# Patient Record
Sex: FEMALE | Race: WHITE | NOT HISPANIC OR LATINO | Employment: UNEMPLOYED | URBAN - METROPOLITAN AREA
[De-identification: names, ages, dates, MRNs, and addresses within clinical notes are randomized per-mention and may not be internally consistent; named-entity substitution may affect disease eponyms.]

---

## 2018-03-26 RX ORDER — FENTANYL 12.5 UG/1
1 PATCH TRANSDERMAL
COMMUNITY
Start: 2018-01-25 | End: 2018-03-26 | Stop reason: SDUPTHER

## 2018-03-26 RX ORDER — OXYCODONE AND ACETAMINOPHEN 5; 325 MG/1; MG/1
1-2 TABLET ORAL EVERY 6 HOURS PRN
COMMUNITY
Start: 2018-01-25 | End: 2018-03-26 | Stop reason: SDUPTHER

## 2018-03-26 NOTE — TELEPHONE ENCOUNTER
Pt would like an written Rx Oxycodone 5mg-325mg and Rx for Rx Fentanyl Patch 12.5 mg. If any questions Pt can be reached

## 2018-03-27 RX ORDER — OXYCODONE AND ACETAMINOPHEN 5; 325 MG/1; MG/1
2 TABLET ORAL EVERY 6 HOURS PRN
Qty: 240 TABLET | Refills: 0 | Status: SHIPPED | OUTPATIENT
Start: 2018-03-27 | End: 2018-04-27 | Stop reason: SDUPTHER

## 2018-03-27 RX ORDER — FENTANYL 12.5 UG/1
1 PATCH TRANSDERMAL
Qty: 10 PATCH | Refills: 0 | Status: SHIPPED | OUTPATIENT
Start: 2018-03-27 | End: 2018-04-27 | Stop reason: SDUPTHER

## 2018-04-09 RX ORDER — ALPRAZOLAM 0.5 MG/1
1 TABLET ORAL DAILY
COMMUNITY
Start: 2017-12-01 | End: 2018-04-09 | Stop reason: SDUPTHER

## 2018-04-10 RX ORDER — ALPRAZOLAM 0.5 MG/1
0.5 TABLET ORAL 3 TIMES DAILY PRN
Qty: 90 TABLET | Refills: 0 | Status: SHIPPED | OUTPATIENT
Start: 2018-04-10 | End: 2018-05-10 | Stop reason: SDUPTHER

## 2018-04-27 NOTE — TELEPHONE ENCOUNTER
Pt needs written Rx Fentanyl Patch 12 mcg and Rx Oxycodone 5-325 mg to be picked up. If any questions pt can be reached at

## 2018-04-30 RX ORDER — OXYCODONE AND ACETAMINOPHEN 5; 325 MG/1; MG/1
2 TABLET ORAL EVERY 6 HOURS PRN
Qty: 240 TABLET | Refills: 0 | Status: SHIPPED | OUTPATIENT
Start: 2018-04-30 | End: 2018-05-29 | Stop reason: SDUPTHER

## 2018-04-30 RX ORDER — FENTANYL 12.5 UG/1
1 PATCH TRANSDERMAL
Qty: 10 PATCH | Refills: 0 | Status: SHIPPED | OUTPATIENT
Start: 2018-04-30 | End: 2018-05-29 | Stop reason: SDUPTHER

## 2018-05-11 RX ORDER — ALPRAZOLAM 0.5 MG/1
0.5 TABLET ORAL 3 TIMES DAILY PRN
Qty: 90 TABLET | Refills: 0 | Status: SHIPPED | OUTPATIENT
Start: 2018-05-11 | End: 2018-06-12 | Stop reason: SDUPTHER

## 2018-05-12 RX ORDER — METOPROLOL SUCCINATE 100 MG/1
100 TABLET, EXTENDED RELEASE ORAL DAILY
Qty: 90 TABLET | Refills: 1 | Status: SHIPPED | OUTPATIENT
Start: 2018-05-12 | End: 2018-05-16 | Stop reason: SDUPTHER

## 2018-05-16 RX ORDER — METOPROLOL SUCCINATE 100 MG/1
100 TABLET, EXTENDED RELEASE ORAL DAILY
Qty: 90 TABLET | Refills: 1 | Status: SHIPPED | OUTPATIENT
Start: 2018-05-16 | End: 2018-10-30 | Stop reason: SDUPTHER

## 2018-05-18 PROBLEM — R10.9 UNSPECIFIED ABDOMINAL PAIN: Status: ACTIVE | Noted: 2017-07-05

## 2018-05-18 RX ORDER — LIFITEGRAST 50 MG/ML
1 SOLUTION/ DROPS OPHTHALMIC 2 TIMES DAILY
Refills: 0 | COMMUNITY
Start: 2018-04-05 | End: 2019-08-20 | Stop reason: SDUPTHER

## 2018-05-18 RX ORDER — METOPROLOL SUCCINATE 100 MG/1
1 TABLET, EXTENDED RELEASE ORAL DAILY
COMMUNITY
Start: 2017-12-13 | End: 2018-10-30

## 2018-05-18 RX ORDER — ESCITALOPRAM OXALATE 20 MG/1
1 TABLET ORAL DAILY
Refills: 0 | COMMUNITY
Start: 2018-04-28 | End: 2018-10-30 | Stop reason: SDUPTHER

## 2018-05-18 RX ORDER — HYDROCHLOROTHIAZIDE 25 MG/1
1 TABLET ORAL DAILY
COMMUNITY
Start: 2017-12-13 | End: 2018-10-30 | Stop reason: SDUPTHER

## 2018-05-18 RX ORDER — OLMESARTAN MEDOXOMIL 40 MG/1
1 TABLET ORAL DAILY
COMMUNITY
Start: 2017-12-13 | End: 2018-10-30 | Stop reason: SDUPTHER

## 2018-05-29 ENCOUNTER — OFFICE VISIT (OUTPATIENT)
Dept: FAMILY MEDICINE | Facility: CLINIC | Age: 53
End: 2018-05-29
Payer: COMMERCIAL

## 2018-05-29 VITALS
OXYGEN SATURATION: 95 % | HEART RATE: 49 BPM | DIASTOLIC BLOOD PRESSURE: 70 MMHG | TEMPERATURE: 98.6 F | BODY MASS INDEX: 32.62 KG/M2 | HEIGHT: 66 IN | WEIGHT: 203 LBS | SYSTOLIC BLOOD PRESSURE: 138 MMHG

## 2018-05-29 DIAGNOSIS — F33.1 MDD (MAJOR DEPRESSIVE DISORDER), RECURRENT EPISODE, MODERATE (CMS/HCC): ICD-10-CM

## 2018-05-29 DIAGNOSIS — M54.42 ACUTE LOW BACK PAIN WITH BILATERAL SCIATICA, UNSPECIFIED BACK PAIN LATERALITY: Primary | ICD-10-CM

## 2018-05-29 DIAGNOSIS — F41.9 ANXIETY: ICD-10-CM

## 2018-05-29 DIAGNOSIS — M54.41 ACUTE LOW BACK PAIN WITH BILATERAL SCIATICA, UNSPECIFIED BACK PAIN LATERALITY: Primary | ICD-10-CM

## 2018-05-29 DIAGNOSIS — I10 HYPERTENSION, ESSENTIAL: ICD-10-CM

## 2018-05-29 PROCEDURE — 99214 OFFICE O/P EST MOD 30 MIN: CPT | Performed by: FAMILY MEDICINE

## 2018-05-29 RX ORDER — OXYCODONE AND ACETAMINOPHEN 5; 325 MG/1; MG/1
2 TABLET ORAL EVERY 6 HOURS PRN
Qty: 180 TABLET | Refills: 0 | Status: SHIPPED | OUTPATIENT
Start: 2018-05-29 | End: 2018-06-19 | Stop reason: SDUPTHER

## 2018-05-29 RX ORDER — KETOROLAC TROMETHAMINE 10 MG/1
10 TABLET, FILM COATED ORAL EVERY 6 HOURS PRN
Qty: 20 TABLET | Refills: 0 | Status: SHIPPED | OUTPATIENT
Start: 2018-05-29 | End: 2019-12-30 | Stop reason: ALTCHOICE

## 2018-05-29 RX ORDER — FENTANYL 25 UG/1
1 PATCH TRANSDERMAL SEE ADMIN INSTRUCTIONS
Refills: 0 | COMMUNITY
Start: 2018-02-23 | End: 2018-05-29 | Stop reason: SDUPTHER

## 2018-05-29 RX ORDER — FENTANYL 25 UG/1
1 PATCH TRANSDERMAL SEE ADMIN INSTRUCTIONS
Qty: 10 PATCH | Refills: 0 | Status: SHIPPED | OUTPATIENT
Start: 2018-05-29 | End: 2018-06-19 | Stop reason: SDUPTHER

## 2018-05-29 RX ORDER — DEXAMETHASONE 1 MG/1
TABLET ORAL
Qty: 200 TABLET | Refills: 0 | Status: SHIPPED | OUTPATIENT
Start: 2018-05-29 | End: 2018-10-30

## 2018-05-29 RX ORDER — CYCLOBENZAPRINE HCL 10 MG
10 TABLET ORAL NIGHTLY PRN
Qty: 30 TABLET | Refills: 1 | Status: SHIPPED | OUTPATIENT
Start: 2018-05-29 | End: 2018-10-30

## 2018-05-29 RX ORDER — FENTANYL 12.5 UG/1
1 PATCH TRANSDERMAL
Qty: 10 PATCH | Refills: 0 | Status: SHIPPED | OUTPATIENT
Start: 2018-05-29 | End: 2018-06-19 | Stop reason: SDUPTHER

## 2018-05-29 NOTE — PROGRESS NOTES
Subjective      Patient ID: Hilda Cornelius is a 52 y.o. female.    5/19 injections didn't hlep much    [place]    Laid down on heating pad. Inflamed/worse  Got up excrutiating    Hold onto wall    No fall, spontaneous back pain      Prednisone 5 days helping  Toradol    [employer] job    Was down to 12.5 patch,  Up to 25 and 12.5 now          The following have been reviewed and updated as appropriate in this visit:         Past Medical History: She  has no past medical history on file.  Past Surgical History: She  has no past surgical history on file.  Medication: She has a current medication list which includes the following prescription(s): alprazolam, escitalopram, fentanyl, hydrochlorothiazide, metoprolol succinate xl, olmesartan, oxycodone-acetaminophen, xiidra, fentanyl, and metoprolol succinate xl.  Allergies: She is allergic to hydrocodone.  Social History: She  reports that she has been smoking.  She has never used smokeless tobacco. She reports that she does not drink alcohol or use drugs.    Review of Systems:  Review of Systems   Musculoskeletal: Positive for back pain.        Radiating down leg         Objective     Physical Exam   Constitutional: She is oriented to person, place, and time. She appears well-developed and well-nourished.   HENT:   Head: Normocephalic and atraumatic.   Right Ear: External ear normal.   Left Ear: External ear normal.   Nose: Nose normal.   Mouth/Throat: Oropharynx is clear and moist.   Eyes: Conjunctivae and EOM are normal. Pupils are equal, round, and reactive to light. Right eye exhibits no discharge. Left eye exhibits no discharge. No scleral icterus.   Neck: No JVD present. No tracheal deviation present. No thyromegaly present.   Cardiovascular: Normal rate, regular rhythm and normal heart sounds.    Pulmonary/Chest: Effort normal and breath sounds normal. No stridor. No respiratory distress. She has no wheezes. She has no rales.   Abdominal:  Bowel sounds are normal.   Musculoskeletal: She exhibits no tenderness.   Lumbar spasm/tenderness   Neurological: She is alert and oriented to person, place, and time. No cranial nerve deficit. Coordination normal.   Skin: Skin is warm and dry. She is not diaphoretic. No erythema. No pallor.   Psychiatric: She has a normal mood and affect. Her behavior is normal. Judgment and thought content normal.   Vitals reviewed.      Assessment/Plan   Problem List Items Addressed This Visit     Acute low back pain with bilateral sciatica - Primary     Add toradol, decadron, flexeril         Anxiety     Cont meds         Hypertension, essential     Mild elvation, monitor         MDD (major depressive disorder), recurrent episode, moderate (CMS/HCC) (HCC)     Exacerbation by severe back pain             No problem-specific Assessment & Plan notes found for this encounter.    Problem List     None

## 2018-05-29 NOTE — LETTER
To Whom it May Concern:    Hilda Cornelius was seen in my office on 5/29/2018 5:45 pmfor severe back pain. She may return to work on 5/30/18     If you have any questions or concerns, please don't hesitate to call.         Sincerely,         Reagan Phan, DO

## 2018-06-05 PROBLEM — F33.1 MDD (MAJOR DEPRESSIVE DISORDER), RECURRENT EPISODE, MODERATE (CMS/HCC): Status: ACTIVE | Noted: 2018-06-05

## 2018-06-05 PROBLEM — M54.42 ACUTE LOW BACK PAIN WITH BILATERAL SCIATICA: Status: ACTIVE | Noted: 2018-06-05

## 2018-06-05 PROBLEM — I10 HYPERTENSION, ESSENTIAL: Status: ACTIVE | Noted: 2018-06-05

## 2018-06-05 PROBLEM — M54.41 ACUTE LOW BACK PAIN WITH BILATERAL SCIATICA: Status: ACTIVE | Noted: 2018-06-05

## 2018-06-05 PROBLEM — F41.9 ANXIETY: Status: ACTIVE | Noted: 2018-06-05

## 2018-06-05 ASSESSMENT — ENCOUNTER SYMPTOMS: BACK PAIN: 1

## 2018-06-13 RX ORDER — ALPRAZOLAM 0.5 MG/1
0.5 TABLET ORAL 3 TIMES DAILY PRN
Qty: 90 TABLET | Refills: 0 | Status: SHIPPED | OUTPATIENT
Start: 2018-06-13 | End: 2018-08-01 | Stop reason: SDUPTHER

## 2018-06-14 RX ORDER — PREDNISONE 20 MG/1
2 TABLET ORAL SEE ADMIN INSTRUCTIONS
Refills: 0 | COMMUNITY
Start: 2018-05-27 | End: 2018-10-30

## 2018-06-19 ENCOUNTER — OFFICE VISIT (OUTPATIENT)
Dept: FAMILY MEDICINE | Facility: CLINIC | Age: 53
End: 2018-06-19
Payer: COMMERCIAL

## 2018-06-19 VITALS
HEART RATE: 74 BPM | SYSTOLIC BLOOD PRESSURE: 130 MMHG | OXYGEN SATURATION: 98 % | TEMPERATURE: 98.1 F | BODY MASS INDEX: 31.82 KG/M2 | HEIGHT: 66 IN | DIASTOLIC BLOOD PRESSURE: 84 MMHG | WEIGHT: 198 LBS

## 2018-06-19 DIAGNOSIS — H10.33 ACUTE CONJUNCTIVITIS OF BOTH EYES, UNSPECIFIED ACUTE CONJUNCTIVITIS TYPE: ICD-10-CM

## 2018-06-19 DIAGNOSIS — I10 HYPERTENSION, ESSENTIAL: ICD-10-CM

## 2018-06-19 DIAGNOSIS — M51.369 DDD (DEGENERATIVE DISC DISEASE), LUMBAR: ICD-10-CM

## 2018-06-19 DIAGNOSIS — K29.00 OTHER ACUTE GASTRITIS WITHOUT HEMORRHAGE: Primary | ICD-10-CM

## 2018-06-19 DIAGNOSIS — D72.829 LEUKOCYTOSIS, UNSPECIFIED TYPE: ICD-10-CM

## 2018-06-19 PROBLEM — K29.70 GASTRITIS: Status: ACTIVE | Noted: 2018-06-19

## 2018-06-19 PROCEDURE — 99213 OFFICE O/P EST LOW 20 MIN: CPT | Mod: 25 | Performed by: FAMILY MEDICINE

## 2018-06-19 PROCEDURE — 36415 COLL VENOUS BLD VENIPUNCTURE: CPT | Performed by: FAMILY MEDICINE

## 2018-06-19 RX ORDER — FENTANYL 12.5 UG/1
1 PATCH TRANSDERMAL
Qty: 10 PATCH | Refills: 0 | Status: SHIPPED | OUTPATIENT
Start: 2018-06-19 | End: 2018-08-01 | Stop reason: SDUPTHER

## 2018-06-19 RX ORDER — FENTANYL 25 UG/1
1 PATCH TRANSDERMAL SEE ADMIN INSTRUCTIONS
Qty: 10 PATCH | Refills: 0 | Status: SHIPPED | OUTPATIENT
Start: 2018-06-19 | End: 2018-08-01 | Stop reason: SDUPTHER

## 2018-06-19 RX ORDER — LANSOPRAZOLE 30 MG/1
30 CAPSULE, DELAYED RELEASE ORAL
Qty: 90 CAPSULE | Refills: 1 | Status: SHIPPED | OUTPATIENT
Start: 2018-06-19 | End: 2018-10-30 | Stop reason: SDUPTHER

## 2018-06-19 RX ORDER — ALBUTEROL SULFATE 90 UG/1
2 INHALANT RESPIRATORY (INHALATION) EVERY 6 HOURS PRN
Qty: 1 INHALER | Refills: 1 | Status: SHIPPED | OUTPATIENT
Start: 2018-06-19 | End: 2019-01-29 | Stop reason: SDUPTHER

## 2018-06-19 RX ORDER — OXYCODONE AND ACETAMINOPHEN 5; 325 MG/1; MG/1
2 TABLET ORAL EVERY 6 HOURS PRN
Qty: 180 TABLET | Refills: 0 | Status: SHIPPED | OUTPATIENT
Start: 2018-06-19 | End: 2019-01-02 | Stop reason: SDUPTHER

## 2018-06-19 RX ORDER — GENTAMICIN SULFATE 3 MG/ML
1 SOLUTION/ DROPS OPHTHALMIC 3 TIMES DAILY
Qty: 5 ML | Refills: 1 | Status: SHIPPED | OUTPATIENT
Start: 2018-06-19 | End: 2019-12-30 | Stop reason: ALTCHOICE

## 2018-06-19 NOTE — PROGRESS NOTES
Subjective      Patient ID: Hilda Cornelius is a 52 y.o. female.    Had epidural  Some congestion    Monday pain under xiphoid    Sent to [facility], [facility]  Wbc elevated, gi cocktail, , r/o perforated ulcer    Has gi appt on 7/3      Gave zpack, heard congestion, lung , some coughing, possible pneumnia  Wants inh, albuterol,   Wbc recheck-was elevated  Infeciton/puss in corners eye      Didn't take decadron, didn't need much    Prevacid 30mg, taking   Eye gentamycin    Diarrhea    Prevacid daily for years          The following have been reviewed and updated as appropriate in this visit:  Problems         Past Medical History: She  has a past medical history of Abdominal pain; Anxiety; Back pain; Hypertension; and MDD (major depressive disorder).  Past Surgical History: She  has a past surgical history that includes Tonsillectomy and Knee surgery.  Medication: She has a current medication list which includes the following prescription(s): alprazolam, dexamethasone, escitalopram, fentanyl, fentanyl, hydrochlorothiazide, metoprolol succinate xl, metoprolol succinate xl, olmesartan, oxycodone-acetaminophen, prednisone, xiidra, and cyclobenzaprine.  Allergies: She is allergic to hydrocodone.  Social History: She  reports that she has been smoking.  She has never used smokeless tobacco. She reports that she does not drink alcohol or use drugs.    Review of Systems:  Review of Systems      Objective     Physical Exam   Constitutional: She is oriented to person, place, and time. She appears well-developed and well-nourished. No distress.   HENT:   Head: Normocephalic and atraumatic.   Right Ear: External ear normal.   Left Ear: External ear normal.   Nose: Nose normal.   Mouth/Throat: Oropharynx is clear and moist.   Eyes: EOM are normal. Pupils are equal, round, and reactive to light. Right eye exhibits discharge. Left eye exhibits discharge. No scleral icterus.   Scant discharge b/l   Neck: No JVD present. No tracheal  deviation present. No thyromegaly present.   Cardiovascular: Normal rate, regular rhythm and normal heart sounds.    Pulmonary/Chest: Effort normal and breath sounds normal. No stridor. No respiratory distress. She has no wheezes. She has no rales.   Abdominal: Bowel sounds are normal.   Musculoskeletal: She exhibits no edema or deformity.   Neurological: She is alert and oriented to person, place, and time. No cranial nerve deficit. Coordination normal.   Skin: Skin is warm and dry. She is not diaphoretic. No erythema.   Psychiatric: She has a normal mood and affect. Her behavior is normal. Judgment and thought content normal.       Assessment/Plan   Problem List Items Addressed This Visit        Other    Hypertension, essential     Controlled, continue present regimen           Gastritis - Primary     Cont prevacid bid, f/u gi, pepto prn, call if diarrhea persists         Relevant Medications    lansoprazole (PREVACID) 30 mg capsule    Leukocytosis     Recheck, finished zpack         Relevant Orders    CBC (Completed)    DDD (degenerative disc disease), lumbar     Cont inj prn, cont pain meds         Acute conjunctivitis of both eyes     Gent gtts         Relevant Medications    gentamicin (GARAMYCIN) 0.3 % ophthalmic solution        No problem-specific Assessment & Plan notes found for this encounter.    Problem List     None

## 2018-06-21 LAB
ERYTHROCYTE [DISTWIDTH] IN BLOOD BY AUTOMATED COUNT: 14.4 % (ref 12.3–15.4)
HCT VFR BLD AUTO: 42.7 % (ref 34–46.6)
HGB BLD-MCNC: 14 G/DL (ref 11.1–15.9)
MCH RBC QN AUTO: 31.4 PG (ref 26.6–33)
MCHC RBC AUTO-ENTMCNC: 32.8 G/DL (ref 31.5–35.7)
MCV RBC AUTO: 96 FL (ref 79–97)
PLATELET # BLD AUTO: 360 X10E3/UL (ref 150–379)
RBC # BLD AUTO: 4.46 X10E6/UL (ref 3.77–5.28)
WBC # BLD AUTO: 11.6 X10E3/UL (ref 3.4–10.8)

## 2018-08-01 RX ORDER — OXYCODONE AND ACETAMINOPHEN 5; 325 MG/1; MG/1
1 TABLET ORAL EVERY 4 HOURS PRN
COMMUNITY
End: 2018-08-01 | Stop reason: SDUPTHER

## 2018-08-01 RX ORDER — ALPRAZOLAM 0.5 MG/1
0.5 TABLET ORAL 3 TIMES DAILY PRN
Qty: 90 TABLET | Refills: 0 | Status: SHIPPED | OUTPATIENT
Start: 2018-08-01 | End: 2018-09-04 | Stop reason: SDUPTHER

## 2018-08-01 RX ORDER — FENTANYL 12.5 UG/1
1 PATCH TRANSDERMAL
Qty: 10 PATCH | Refills: 0 | Status: SHIPPED | OUTPATIENT
Start: 2018-08-01 | End: 2018-09-04 | Stop reason: SDUPTHER

## 2018-08-01 RX ORDER — OXYCODONE AND ACETAMINOPHEN 5; 325 MG/1; MG/1
1 TABLET ORAL EVERY 4 HOURS PRN
Qty: 180 TABLET | Refills: 0 | Status: SHIPPED | OUTPATIENT
Start: 2018-08-01 | End: 2019-04-09 | Stop reason: SDUPTHER

## 2018-08-01 RX ORDER — FENTANYL 25 UG/1
1 PATCH TRANSDERMAL SEE ADMIN INSTRUCTIONS
Qty: 10 PATCH | Refills: 0 | Status: SHIPPED | OUTPATIENT
Start: 2018-08-01 | End: 2018-09-04 | Stop reason: SDUPTHER

## 2018-09-04 ENCOUNTER — TELEPHONE (OUTPATIENT)
Dept: FAMILY MEDICINE | Facility: CLINIC | Age: 53
End: 2018-09-04

## 2018-09-04 ENCOUNTER — OFFICE VISIT (OUTPATIENT)
Dept: FAMILY MEDICINE | Facility: CLINIC | Age: 53
End: 2018-09-04
Payer: COMMERCIAL

## 2018-09-04 VITALS
DIASTOLIC BLOOD PRESSURE: 72 MMHG | WEIGHT: 200 LBS | HEIGHT: 66 IN | TEMPERATURE: 98.9 F | SYSTOLIC BLOOD PRESSURE: 130 MMHG | BODY MASS INDEX: 32.14 KG/M2 | HEART RATE: 63 BPM | OXYGEN SATURATION: 99 %

## 2018-09-04 DIAGNOSIS — F41.9 ANXIETY: ICD-10-CM

## 2018-09-04 DIAGNOSIS — G89.4 CHRONIC PAIN SYNDROME: ICD-10-CM

## 2018-09-04 DIAGNOSIS — I10 HYPERTENSION, ESSENTIAL: ICD-10-CM

## 2018-09-04 DIAGNOSIS — K11.20 PAROTITIS: Primary | ICD-10-CM

## 2018-09-04 PROBLEM — R73.01 IMPAIRED FASTING GLUCOSE: Status: ACTIVE | Noted: 2018-09-04

## 2018-09-04 PROCEDURE — 99214 OFFICE O/P EST MOD 30 MIN: CPT | Performed by: FAMILY MEDICINE

## 2018-09-04 RX ORDER — OXYCODONE AND ACETAMINOPHEN 10; 325 MG/1; MG/1
1 TABLET ORAL EVERY 6 HOURS PRN
Qty: 120 TABLET | Refills: 0 | Status: SHIPPED | OUTPATIENT
Start: 2018-09-04 | End: 2018-09-04

## 2018-09-04 RX ORDER — FENTANYL 25 UG/1
1 PATCH TRANSDERMAL SEE ADMIN INSTRUCTIONS
Qty: 10 PATCH | Refills: 0 | Status: CANCELLED | OUTPATIENT
Start: 2018-09-04

## 2018-09-04 RX ORDER — OXYCODONE AND ACETAMINOPHEN 10; 325 MG/1; MG/1
TABLET ORAL
COMMUNITY
End: 2018-09-04 | Stop reason: SDUPTHER

## 2018-09-04 RX ORDER — FENTANYL 12.5 UG/1
1 PATCH TRANSDERMAL
Qty: 10 PATCH | Refills: 0 | Status: CANCELLED | OUTPATIENT
Start: 2018-09-04

## 2018-09-04 RX ORDER — SULFAMETHOXAZOLE AND TRIMETHOPRIM 800; 160 MG/1; MG/1
1 TABLET ORAL 2 TIMES DAILY
Qty: 28 TABLET | Refills: 0 | Status: SHIPPED | OUTPATIENT
Start: 2018-09-04 | End: 2019-12-30 | Stop reason: ALTCHOICE

## 2018-09-04 RX ORDER — ALPRAZOLAM 0.5 MG/1
0.5 TABLET ORAL 3 TIMES DAILY PRN
Qty: 90 TABLET | Refills: 0 | Status: CANCELLED | OUTPATIENT
Start: 2018-09-04

## 2018-09-04 RX ORDER — OXYCODONE AND ACETAMINOPHEN 10; 325 MG/1; MG/1
TABLET ORAL
Status: CANCELLED | OUTPATIENT
Start: 2018-09-04

## 2018-09-04 RX ORDER — FENTANYL 12.5 UG/1
1 PATCH TRANSDERMAL
Qty: 10 PATCH | Refills: 0 | Status: SHIPPED | OUTPATIENT
Start: 2018-09-04 | End: 2018-10-01 | Stop reason: SDUPTHER

## 2018-09-04 RX ORDER — FENTANYL 25 UG/1
1 PATCH TRANSDERMAL SEE ADMIN INSTRUCTIONS
Qty: 10 PATCH | Refills: 0 | Status: SHIPPED | OUTPATIENT
Start: 2018-09-04 | End: 2018-10-01 | Stop reason: SDUPTHER

## 2018-09-04 RX ORDER — KETOROLAC TROMETHAMINE 10 MG/1
10 TABLET, FILM COATED ORAL EVERY 6 HOURS PRN
Qty: 20 TABLET | Refills: 0 | Status: SHIPPED | OUTPATIENT
Start: 2018-09-04 | End: 2018-10-30

## 2018-09-04 RX ORDER — KETOROLAC TROMETHAMINE 10 MG/1
10 TABLET, FILM COATED ORAL EVERY 6 HOURS PRN
Qty: 20 TABLET | Refills: 0 | Status: CANCELLED | OUTPATIENT
Start: 2018-09-04

## 2018-09-04 RX ORDER — ALPRAZOLAM 0.5 MG/1
0.5 TABLET ORAL 3 TIMES DAILY PRN
Qty: 90 TABLET | Refills: 0 | Status: SHIPPED | OUTPATIENT
Start: 2018-09-04 | End: 2018-10-24 | Stop reason: SDUPTHER

## 2018-09-04 RX ORDER — OXYCODONE AND ACETAMINOPHEN 5; 325 MG/1; MG/1
2 TABLET ORAL EVERY 6 HOURS PRN
Qty: 180 TABLET | Refills: 0 | Status: SHIPPED | OUTPATIENT
Start: 2018-09-04 | End: 2019-06-28 | Stop reason: SDUPTHER

## 2018-09-04 RX ORDER — FENTANYL 12.5 UG/1
1 PATCH TRANSDERMAL
Qty: 10 PATCH | Refills: 0 | Status: SHIPPED | OUTPATIENT
Start: 2018-09-04 | End: 2018-09-04 | Stop reason: SDUPTHER

## 2018-09-04 RX ORDER — KETOROLAC TROMETHAMINE 10 MG/1
10 TABLET, FILM COATED ORAL EVERY 6 HOURS PRN
COMMUNITY
End: 2018-09-04 | Stop reason: SDUPTHER

## 2018-09-04 NOTE — TELEPHONE ENCOUNTER
Fentanyl 12mcg/hr patch  Fentanyl 25mcg/hr patch  Alprazolam 0.5 mg TID  Ketoralac  Oxycodone  Patient would like to  tomorrow if possible

## 2018-09-04 NOTE — PROGRESS NOTES
Subjective      Patient ID: Hilda Cornelius is a 53 y.o. female.    Right parotoid infected again  Called ent, 2-3 wks    Wedding /grandbaby coming    Started this weekend    Was on for 2 wks        Needed refills, this am    Blew up this morning after call in            The following have been reviewed and updated as appropriate in this visit:  Problems         Past Medical History: She  has a past medical history of Abdominal pain; Anxiety; Back pain; Hypertension; and MDD (major depressive disorder).  Past Surgical History: She  has a past surgical history that includes Tonsillectomy and Knee surgery.  Medication: She has a current medication list which includes the following prescription(s): alprazolam, escitalopram, fentanyl, fentanyl, hydrochlorothiazide, ketorolac, lansoprazole, metoprolol succinate xl, olmesartan, oxycodone-acetaminophen, cyclobenzaprine, dexamethasone, metoprolol succinate xl, prednisone, and xiidra.  Allergies: She is allergic to hydrocodone and vitamin b complex.  Social History: She  reports that she has been smoking.  She has never used smokeless tobacco. She reports that she does not drink alcohol or use drugs.    Review of Systems:  Review of Systems   Constitutional: Negative for chills, diaphoresis and fever.   HENT:        Right parotid gland enlargement   Musculoskeletal:        Back pain at baseline         Objective     Physical Exam   Constitutional: She is oriented to person, place, and time. She appears well-developed and well-nourished. No distress.   HENT:   Head: Normocephalic and atraumatic.   Right Ear: External ear normal.   Left Ear: External ear normal.   Nose: Nose normal.   Mouth/Throat: Oropharynx is clear and moist.   Right parotid gland swelling, mildly tender, not warm   Eyes: Conjunctivae and EOM are normal. Pupils are equal, round, and reactive to light. Right eye exhibits no discharge. Left eye exhibits no discharge. No scleral icterus.   Neck: Neck supple.  No JVD present. No tracheal deviation present. No thyromegaly present.   Cardiovascular: Normal rate, regular rhythm and normal heart sounds.    Pulmonary/Chest: Effort normal and breath sounds normal. No stridor. No respiratory distress. She has no wheezes. She has no rales.   Abdominal: Bowel sounds are normal.   Musculoskeletal: She exhibits no edema or deformity.   Lymphadenopathy:     She has no cervical adenopathy.   Neurological: She is alert and oriented to person, place, and time. No cranial nerve deficit. Coordination normal.   Skin: Skin is warm and dry. She is not diaphoretic. No erythema. No pallor.   Psychiatric: She has a normal mood and affect. Her behavior is normal. Judgment and thought content normal.   Vitals reviewed.      Assessment/Plan   Problem List Items Addressed This Visit        Other    Anxiety     Controlled, continue present regimen           Hypertension, essential     Controlled, continue present regimen           Parotitis - Primary     Empiric abx, f/u ent         Chronic pain syndrome     Controlled, continue present regimen               No problem-specific Assessment & Plan notes found for this encounter.    Problem List Items Addressed This Visit     None

## 2018-09-10 PROBLEM — G89.4 CHRONIC PAIN SYNDROME: Status: ACTIVE | Noted: 2018-09-10

## 2018-09-10 PROBLEM — K11.20 PAROTITIS: Status: ACTIVE | Noted: 2018-09-10

## 2018-09-10 ASSESSMENT — ENCOUNTER SYMPTOMS
DIAPHORESIS: 0
CHILLS: 0
FEVER: 0

## 2018-10-01 RX ORDER — FENTANYL 25 UG/1
1 PATCH TRANSDERMAL SEE ADMIN INSTRUCTIONS
Qty: 10 PATCH | Refills: 0 | Status: SHIPPED | OUTPATIENT
Start: 2018-10-01 | End: 2018-10-30 | Stop reason: SDUPTHER

## 2018-10-01 RX ORDER — FENTANYL 12.5 UG/1
1 PATCH TRANSDERMAL
Qty: 10 PATCH | Refills: 0 | Status: SHIPPED | OUTPATIENT
Start: 2018-10-01 | End: 2018-10-30 | Stop reason: SDUPTHER

## 2018-10-01 RX ORDER — OXYCODONE AND ACETAMINOPHEN 10; 325 MG/1; MG/1
1 TABLET ORAL EVERY 4 HOURS PRN
Refills: 0 | Status: CANCELLED | OUTPATIENT
Start: 2018-10-01 | End: 2018-10-11

## 2018-10-01 RX ORDER — OXYCODONE AND ACETAMINOPHEN 5; 325 MG/1; MG/1
1 TABLET ORAL EVERY 6 HOURS PRN
Qty: 180 TABLET | Refills: 0 | Status: SHIPPED | OUTPATIENT
Start: 2018-10-01 | End: 2018-10-30 | Stop reason: SDUPTHER

## 2018-10-01 RX ORDER — OXYCODONE AND ACETAMINOPHEN 5; 325 MG/1; MG/1
1 TABLET ORAL EVERY 4 HOURS PRN
COMMUNITY
End: 2018-10-01 | Stop reason: SDUPTHER

## 2018-10-25 RX ORDER — ALPRAZOLAM 0.5 MG/1
0.5 TABLET ORAL 3 TIMES DAILY PRN
Qty: 90 TABLET | Refills: 0 | Status: SHIPPED | OUTPATIENT
Start: 2018-10-25 | End: 2018-12-03 | Stop reason: SDUPTHER

## 2018-10-30 ENCOUNTER — OFFICE VISIT (OUTPATIENT)
Dept: FAMILY MEDICINE | Facility: CLINIC | Age: 53
End: 2018-10-30
Payer: COMMERCIAL

## 2018-10-30 VITALS
OXYGEN SATURATION: 99 % | SYSTOLIC BLOOD PRESSURE: 130 MMHG | WEIGHT: 199 LBS | HEART RATE: 74 BPM | TEMPERATURE: 98.2 F | DIASTOLIC BLOOD PRESSURE: 76 MMHG | HEIGHT: 68 IN | BODY MASS INDEX: 30.16 KG/M2

## 2018-10-30 DIAGNOSIS — F41.9 ANXIETY: ICD-10-CM

## 2018-10-30 DIAGNOSIS — Z83.49 FAMILY HISTORY OF HEMOCHROMATOSIS: ICD-10-CM

## 2018-10-30 DIAGNOSIS — N28.9 KIDNEY LESION: ICD-10-CM

## 2018-10-30 DIAGNOSIS — G89.4 CHRONIC PAIN SYNDROME: ICD-10-CM

## 2018-10-30 DIAGNOSIS — I10 HYPERTENSION, ESSENTIAL: ICD-10-CM

## 2018-10-30 DIAGNOSIS — R53.83 FATIGUE, UNSPECIFIED TYPE: Primary | ICD-10-CM

## 2018-10-30 PROCEDURE — 99214 OFFICE O/P EST MOD 30 MIN: CPT | Mod: 25 | Performed by: FAMILY MEDICINE

## 2018-10-30 PROCEDURE — 36415 COLL VENOUS BLD VENIPUNCTURE: CPT | Performed by: FAMILY MEDICINE

## 2018-10-30 RX ORDER — METOPROLOL SUCCINATE 100 MG/1
100 TABLET, EXTENDED RELEASE ORAL DAILY
Qty: 30 TABLET | Refills: 2 | Status: SHIPPED | OUTPATIENT
Start: 2018-10-30 | End: 2019-06-14 | Stop reason: SDUPTHER

## 2018-10-30 RX ORDER — OLMESARTAN MEDOXOMIL 40 MG/1
40 TABLET ORAL DAILY
Qty: 30 TABLET | Refills: 2 | Status: SHIPPED | OUTPATIENT
Start: 2018-10-30 | End: 2019-01-29 | Stop reason: SDUPTHER

## 2018-10-30 RX ORDER — FENTANYL 12.5 UG/1
1 PATCH TRANSDERMAL
Qty: 10 PATCH | Refills: 0 | Status: SHIPPED | OUTPATIENT
Start: 2018-10-30 | End: 2018-11-29 | Stop reason: SDUPTHER

## 2018-10-30 RX ORDER — OXYCODONE AND ACETAMINOPHEN 5; 325 MG/1; MG/1
1 TABLET ORAL EVERY 6 HOURS PRN
Qty: 180 TABLET | Refills: 0 | Status: SHIPPED | OUTPATIENT
Start: 2018-10-30 | End: 2018-10-30 | Stop reason: SDUPTHER

## 2018-10-30 RX ORDER — OXYCODONE AND ACETAMINOPHEN 5; 325 MG/1; MG/1
2 TABLET ORAL EVERY 6 HOURS PRN
Qty: 180 TABLET | Refills: 0 | Status: SHIPPED | OUTPATIENT
Start: 2018-10-30 | End: 2018-11-29 | Stop reason: SDUPTHER

## 2018-10-30 RX ORDER — LANSOPRAZOLE 30 MG/1
30 CAPSULE, DELAYED RELEASE ORAL
Qty: 30 CAPSULE | Refills: 2 | Status: SHIPPED | OUTPATIENT
Start: 2018-10-30 | End: 2019-01-02 | Stop reason: SDUPTHER

## 2018-10-30 RX ORDER — ESCITALOPRAM OXALATE 20 MG/1
20 TABLET ORAL DAILY
Qty: 30 TABLET | Refills: 2 | Status: SHIPPED | OUTPATIENT
Start: 2018-10-30 | End: 2019-02-28 | Stop reason: SDUPTHER

## 2018-10-30 RX ORDER — FENTANYL 25 UG/1
1 PATCH TRANSDERMAL SEE ADMIN INSTRUCTIONS
Qty: 10 PATCH | Refills: 0 | Status: SHIPPED | OUTPATIENT
Start: 2018-10-30 | End: 2018-11-29 | Stop reason: SDUPTHER

## 2018-10-30 RX ORDER — HYDROCHLOROTHIAZIDE 25 MG/1
25 TABLET ORAL DAILY
Qty: 30 TABLET | Refills: 2 | Status: SHIPPED | OUTPATIENT
Start: 2018-10-30 | End: 2019-04-11 | Stop reason: SDUPTHER

## 2018-10-30 NOTE — PROGRESS NOTES
Subjective      Patient ID: Hilda Cornelius is a 53 y.o. female.    Had ct in [facility] may  Kidneys needs     Ultra sound kidney    Really tired lately    Pain stable    Mood other wise stable        Focused            The following have been reviewed and updated as appropriate in this visit:  Problems         Past Medical History: She  has a past medical history of Abdominal pain; Anxiety; Back pain; Hypertension; and MDD (major depressive disorder).  Past Surgical History: She  has a past surgical history that includes Tonsillectomy and Knee surgery.  Medication: She has a current medication list which includes the following prescription(s): alprazolam, escitalopram, fentanyl, fentanyl, hydrochlorothiazide, lansoprazole, metoprolol succinate xl, olmesartan, oxycodone-acetaminophen, cyclobenzaprine, metoprolol succinate xl, and xiidra.  Allergies: She is allergic to hydrocodone and vitamin b complex.  Social History: She  reports that she has been smoking.  She has never used smokeless tobacco. She reports that she does not drink alcohol or use drugs.    Review of Systems:  Review of Systems   Constitutional: Positive for fatigue.   Musculoskeletal:        Pain stable   Psychiatric/Behavioral:        Mood stable         Objective     Physical Exam   Constitutional: She is oriented to person, place, and time. She appears well-developed and well-nourished. No distress.   HENT:   Head: Normocephalic and atraumatic.   Right Ear: External ear normal.   Left Ear: External ear normal.   Nose: Nose normal.   Mouth/Throat: Oropharynx is clear and moist.   Eyes: Conjunctivae and EOM are normal. Pupils are equal, round, and reactive to light. Right eye exhibits no discharge. Left eye exhibits no discharge. No scleral icterus.   Neck: No JVD present. No tracheal deviation present.   Cardiovascular: Normal rate, regular rhythm and normal heart sounds.    Pulmonary/Chest: Effort normal and breath sounds normal. No stridor. No  respiratory distress. She has no wheezes. She has no rales.   Abdominal: Bowel sounds are normal.   Musculoskeletal: She exhibits no edema or deformity.   Neurological: She is alert and oriented to person, place, and time. No cranial nerve deficit. Coordination normal.   Skin: Skin is warm and dry. She is not diaphoretic. No erythema. No pallor.   Psychiatric: She has a normal mood and affect. Her behavior is normal. Judgment and thought content normal.       Assessment/Plan   Problem List Items Addressed This Visit        Other    Anxiety     Controlled, continue present regimen           Hypertension, essential     Controlled, continue present regimen           Relevant Medications    olmesartan (BENICAR) 40 mg tablet    metoprolol succinate XL (TOPROL-XL) 100 mg 24 hr tablet    hydrochlorothiazide (HYDRODIURIL) 25 mg tablet    Chronic pain syndrome     Controlled, continue present regimen           Fatigue - Primary     Check bw         Family history of hemochromatosis     Check bw         Kidney lesion     Seen on ct, check us         Relevant Orders    ULTRASOUND KIDNEYS        No problem-specific Assessment & Plan notes found for this encounter.    Problem List Items Addressed This Visit     None

## 2018-11-24 PROBLEM — N28.9 KIDNEY LESION: Status: ACTIVE | Noted: 2018-11-24

## 2018-11-24 ASSESSMENT — ENCOUNTER SYMPTOMS: FATIGUE: 1

## 2018-11-27 RX ORDER — OXYCODONE AND ACETAMINOPHEN 5; 325 MG/1; MG/1
2 TABLET ORAL EVERY 6 HOURS PRN
Qty: 180 TABLET | Refills: 0 | Status: CANCELLED | OUTPATIENT
Start: 2018-11-27

## 2018-11-27 RX ORDER — FENTANYL 25 UG/1
1 PATCH TRANSDERMAL SEE ADMIN INSTRUCTIONS
Qty: 10 PATCH | Refills: 0 | Status: CANCELLED | OUTPATIENT
Start: 2018-11-27

## 2018-11-27 RX ORDER — FENTANYL 12.5 UG/1
1 PATCH TRANSDERMAL
Qty: 10 PATCH | Refills: 0 | Status: CANCELLED | OUTPATIENT
Start: 2018-11-27

## 2018-11-29 RX ORDER — FENTANYL 12.5 UG/1
1 PATCH TRANSDERMAL
Qty: 10 PATCH | Refills: 0 | Status: SHIPPED | OUTPATIENT
Start: 2018-11-29 | End: 2019-01-02 | Stop reason: SDUPTHER

## 2018-11-29 RX ORDER — OXYCODONE AND ACETAMINOPHEN 5; 325 MG/1; MG/1
2 TABLET ORAL EVERY 6 HOURS PRN
Qty: 180 TABLET | Refills: 0 | Status: SHIPPED | OUTPATIENT
Start: 2018-11-29 | End: 2019-03-04 | Stop reason: SDUPTHER

## 2018-11-29 RX ORDER — FENTANYL 25 UG/1
1 PATCH TRANSDERMAL SEE ADMIN INSTRUCTIONS
Qty: 10 PATCH | Refills: 0 | Status: SHIPPED | OUTPATIENT
Start: 2018-11-29 | End: 2019-01-02 | Stop reason: SDUPTHER

## 2018-12-03 RX ORDER — ALPRAZOLAM 0.5 MG/1
0.5 TABLET ORAL 3 TIMES DAILY PRN
Qty: 90 TABLET | Refills: 0 | Status: SHIPPED | OUTPATIENT
Start: 2018-12-03 | End: 2019-01-28 | Stop reason: SDUPTHER

## 2018-12-03 NOTE — TELEPHONE ENCOUNTER
Patient called for refill of Alprazolam 0.5mg  3/d  Sent to [facility] [address]  Pt contact #[phone] last seen 10/30/18

## 2018-12-10 ENCOUNTER — TELEPHONE (OUTPATIENT)
Dept: FAMILY MEDICINE | Facility: CLINIC | Age: 53
End: 2018-12-10

## 2018-12-10 RX ORDER — ONDANSETRON 4 MG/1
4 TABLET, FILM COATED ORAL EVERY 8 HOURS PRN
Qty: 12 TABLET | Refills: 1 | Status: SHIPPED | OUTPATIENT
Start: 2018-12-10 | End: 2019-12-30 | Stop reason: ALTCHOICE

## 2018-12-10 NOTE — TELEPHONE ENCOUNTER
Patient states woke up this morning with vomiting, severe diarrihia, fever, chills and body aches. States had flu shot. Asking for you to call her or call in med.     Can be reached at [phone]

## 2019-01-02 NOTE — TELEPHONE ENCOUNTER
Patient called for refills for Lansoprazole 30mg 1/d  Sent to [facility] [address]   Patient also requesting written prescriptions  fo Fentanyle 12.gmg #10 and Fentanyl 25mg #10  And Oxycodone  5mg-325mg  2 tabs 3 times daily  Pt will  those prescriptions onThursday  Pt contact  [phone] last seen 10/301/8

## 2019-01-03 RX ORDER — FENTANYL 12.5 UG/1
1 PATCH TRANSDERMAL
Qty: 10 PATCH | Refills: 0 | Status: SHIPPED | OUTPATIENT
Start: 2019-01-03 | End: 2019-01-29 | Stop reason: SDUPTHER

## 2019-01-03 RX ORDER — FENTANYL 25 UG/1
1 PATCH TRANSDERMAL SEE ADMIN INSTRUCTIONS
Qty: 10 PATCH | Refills: 0 | Status: SHIPPED | OUTPATIENT
Start: 2019-01-03 | End: 2019-01-29 | Stop reason: SDUPTHER

## 2019-01-03 RX ORDER — LANSOPRAZOLE 30 MG/1
30 CAPSULE, DELAYED RELEASE ORAL
Qty: 30 CAPSULE | Refills: 2 | Status: SHIPPED | OUTPATIENT
Start: 2019-01-03 | End: 2019-04-29 | Stop reason: SDUPTHER

## 2019-01-03 RX ORDER — OXYCODONE AND ACETAMINOPHEN 5; 325 MG/1; MG/1
2 TABLET ORAL EVERY 6 HOURS PRN
Qty: 180 TABLET | Refills: 0 | Status: SHIPPED | OUTPATIENT
Start: 2019-01-03 | End: 2019-01-29 | Stop reason: SDUPTHER

## 2019-01-25 NOTE — TELEPHONE ENCOUNTER
Patient requesting refill of Alprazolam 0.5mg 3/d PRN sent to [facility] [address]  Pt contact   Last seen 10/30/18

## 2019-01-28 RX ORDER — ALPRAZOLAM 0.5 MG/1
0.5 TABLET ORAL 3 TIMES DAILY PRN
Qty: 90 TABLET | Refills: 0 | Status: SHIPPED | OUTPATIENT
Start: 2019-01-28 | End: 2019-02-27 | Stop reason: SDUPTHER

## 2019-01-29 ENCOUNTER — OFFICE VISIT (OUTPATIENT)
Dept: FAMILY MEDICINE | Facility: CLINIC | Age: 54
End: 2019-01-29
Payer: COMMERCIAL

## 2019-01-29 VITALS
HEIGHT: 66 IN | DIASTOLIC BLOOD PRESSURE: 80 MMHG | WEIGHT: 204 LBS | BODY MASS INDEX: 32.78 KG/M2 | SYSTOLIC BLOOD PRESSURE: 160 MMHG | TEMPERATURE: 98.6 F

## 2019-01-29 DIAGNOSIS — Z13.9 SCREENING FOR CONDITION: ICD-10-CM

## 2019-01-29 DIAGNOSIS — Z83.49 FAMILY HISTORY OF HEMOCHROMATOSIS: ICD-10-CM

## 2019-01-29 DIAGNOSIS — I10 HYPERTENSION, ESSENTIAL: ICD-10-CM

## 2019-01-29 DIAGNOSIS — R73.01 IMPAIRED FASTING GLUCOSE: Primary | ICD-10-CM

## 2019-01-29 DIAGNOSIS — J98.01 BRONCHOSPASM: ICD-10-CM

## 2019-01-29 DIAGNOSIS — Z13.220 SCREENING FOR LIPOID DISORDERS: ICD-10-CM

## 2019-01-29 DIAGNOSIS — Z13.29 ENCOUNTER FOR SCREENING FOR ENDOCRINE DISORDER: ICD-10-CM

## 2019-01-29 DIAGNOSIS — J32.9 SINOBRONCHITIS: ICD-10-CM

## 2019-01-29 DIAGNOSIS — G89.4 CHRONIC PAIN SYNDROME: ICD-10-CM

## 2019-01-29 DIAGNOSIS — J40 SINOBRONCHITIS: ICD-10-CM

## 2019-01-29 PROCEDURE — 99214 OFFICE O/P EST MOD 30 MIN: CPT | Performed by: FAMILY MEDICINE

## 2019-01-29 RX ORDER — FENTANYL 25 UG/1
1 PATCH TRANSDERMAL SEE ADMIN INSTRUCTIONS
Qty: 10 PATCH | Refills: 0 | Status: SHIPPED | OUTPATIENT
Start: 2019-01-29 | End: 2019-03-04 | Stop reason: SDUPTHER

## 2019-01-29 RX ORDER — ALBUTEROL SULFATE 90 UG/1
2 INHALANT RESPIRATORY (INHALATION) EVERY 6 HOURS PRN
Qty: 1 INHALER | Refills: 1 | Status: SHIPPED | OUTPATIENT
Start: 2019-01-29 | End: 2019-12-30

## 2019-01-29 RX ORDER — OXYCODONE AND ACETAMINOPHEN 5; 325 MG/1; MG/1
2 TABLET ORAL EVERY 6 HOURS PRN
Qty: 180 TABLET | Refills: 0 | Status: SHIPPED | OUTPATIENT
Start: 2019-01-29 | End: 2019-08-26 | Stop reason: SDUPTHER

## 2019-01-29 RX ORDER — AZITHROMYCIN 250 MG/1
TABLET, FILM COATED ORAL
Qty: 6 TABLET | Refills: 0 | Status: SHIPPED | OUTPATIENT
Start: 2019-01-29 | End: 2019-12-30 | Stop reason: ALTCHOICE

## 2019-01-29 RX ORDER — METHYLPREDNISOLONE 4 MG/1
TABLET ORAL
Qty: 21 TABLET | Refills: 0 | Status: SHIPPED | OUTPATIENT
Start: 2019-01-29 | End: 2019-12-30 | Stop reason: ALTCHOICE

## 2019-01-29 RX ORDER — FENTANYL 12.5 UG/1
1 PATCH TRANSDERMAL
Qty: 10 PATCH | Refills: 0 | Status: SHIPPED | OUTPATIENT
Start: 2019-01-29 | End: 2019-03-04 | Stop reason: SDUPTHER

## 2019-01-29 ASSESSMENT — ENCOUNTER SYMPTOMS
SINUS PAIN: 0
COUGH: 1
FATIGUE: 1
CHILLS: 1
RHINORRHEA: 1
WHEEZING: 1
SORE THROAT: 1
DIAPHORESIS: 0
SINUS PRESSURE: 0
SHORTNESS OF BREATH: 1

## 2019-01-29 NOTE — PROGRESS NOTES
Subjective      Patient ID: Hilda Cornelius is a 53 y.o. female.    Mom hemochromatosis  Hair following out  Fatigue    Hoping few days  Sick  Emergenc  Some sob  Colored plegm  Some f/c    Had knee surgery still swelling  Hip stable  Had seen podiatry    Right great toe   Dec rom/pain      Had gone therapy,   Didn't help too much    Acute sinobronchitis            The following have been reviewed and updated as appropriate in this visit:  Problems         Past Medical History: She  has a past medical history of Abdominal pain; Anxiety; Back pain; Hypertension; and MDD (major depressive disorder).  Past Surgical History: She  has a past surgical history that includes Tonsillectomy and Knee surgery.  Medication: She has a current medication list which includes the following prescription(s): albuterol hfa, alprazolam, escitalopram, fentanyl, fentanyl, gentamicin, hydrochlorothiazide, ketorolac, lansoprazole, metoprolol succinate xl, olmesartan, ondansetron, oxycodone-acetaminophen, oxycodone-acetaminophen, oxycodone-acetaminophen, oxycodone-acetaminophen, sulfamethoxazole-trimethoprim, and xiidra.  Allergies: She is allergic to hydrocodone and vitamin b complex.  Social History: She  reports that she has been smoking.  She has never used smokeless tobacco. She reports that she does not drink alcohol or use drugs.    Review of Systems:  Review of Systems   Constitutional: Positive for chills and fatigue. Negative for diaphoresis.   HENT: Positive for congestion, postnasal drip, rhinorrhea and sore throat. Negative for sinus pain and sinus pressure.    Respiratory: Positive for cough, shortness of breath and wheezing.    Musculoskeletal:        Pain ongoing issue, seeking second opinion right great toe   Psychiatric/Behavioral:        Anxious about bw for hemochromatosis         Objective     Physical Exam   Constitutional: She is oriented to person, place, and time. She appears well-developed and well-nourished. No  distress.   HENT:   Head: Normocephalic and atraumatic.   Right Ear: External ear normal.   Left Ear: External ear normal.   Nose: Nose normal.   Mouth/Throat: Oropharynx is clear and moist.   Eyes: Conjunctivae and EOM are normal. Pupils are equal, round, and reactive to light. Right eye exhibits no discharge. Left eye exhibits no discharge. No scleral icterus.   Neck: Neck supple. No JVD present. No tracheal deviation present.   Cardiovascular: Normal rate, regular rhythm and normal heart sounds.    Pulmonary/Chest: Effort normal. No stridor. No respiratory distress. She has wheezes. She has no rales.   Abdominal: Bowel sounds are normal.   Musculoskeletal: She exhibits no edema or deformity.   Right great toe dec rom   Neurological: She is alert and oriented to person, place, and time. No cranial nerve deficit. Coordination normal.   Skin: Skin is warm and dry. She is not diaphoretic. No erythema. No pallor.   Psychiatric: She has a normal mood and affect. Her behavior is normal. Judgment and thought content normal.   Vitals reviewed.      Assessment/Plan   Problem List Items Addressed This Visit        Other    Hypertension, essential     Controlled, continue present regimen           Impaired fasting glucose - Primary     rx bw         Chronic pain syndrome    Family history of hemochromatosis    Screening for lipoid disorders    Encounter for screening for endocrine disorder    Screening for condition    Sinobronchitis     Zpack/medrol/ventol, Call/RTO if worsens/persists           Relevant Medications    albuterol HFA (VENTOLIN HFA) 90 mcg/actuation inhaler    Bronchospasm        No problem-specific Assessment & Plan notes found for this encounter.    Problem List Items Addressed This Visit     None

## 2019-01-30 RX ORDER — OLMESARTAN MEDOXOMIL 40 MG/1
40 TABLET ORAL DAILY
Qty: 30 TABLET | Refills: 6 | Status: SHIPPED | OUTPATIENT
Start: 2019-01-30 | End: 2019-10-18 | Stop reason: SDUPTHER

## 2019-02-26 ENCOUNTER — TELEPHONE (OUTPATIENT)
Dept: FAMILY MEDICINE | Facility: CLINIC | Age: 54
End: 2019-02-26

## 2019-02-26 RX ORDER — AMOXICILLIN 500 MG/1
500 CAPSULE ORAL 3 TIMES DAILY
Qty: 30 CAPSULE | Refills: 0 | Status: SHIPPED | OUTPATIENT
Start: 2019-02-26 | End: 2019-12-30 | Stop reason: ALTCHOICE

## 2019-02-27 RX ORDER — ALPRAZOLAM 0.5 MG/1
0.5 TABLET ORAL 3 TIMES DAILY PRN
Qty: 90 TABLET | Refills: 0 | Status: SHIPPED | OUTPATIENT
Start: 2019-02-27 | End: 2019-04-09 | Stop reason: SDUPTHER

## 2019-02-27 NOTE — TELEPHONE ENCOUNTER
Patient called requesting refill of Alprazolam 0.5mg  2 tab 3x /d sent to [facility] [address]  Only has enough for one more day  Last seen 1/29/19   PT contact

## 2019-03-01 ENCOUNTER — TELEPHONE (OUTPATIENT)
Dept: FAMILY MEDICINE | Facility: CLINIC | Age: 54
End: 2019-03-01

## 2019-03-01 NOTE — TELEPHONE ENCOUNTER
Patient requesting refill of Escitalopram 20mg be sent to [facility] [address]     Pt contact   [phone]  Last seen 1/29/19

## 2019-03-02 RX ORDER — ESCITALOPRAM OXALATE 20 MG/1
20 TABLET ORAL DAILY
Qty: 30 TABLET | Refills: 5 | Status: SHIPPED | OUTPATIENT
Start: 2019-03-02 | End: 2019-10-07 | Stop reason: SDUPTHER

## 2019-03-05 ENCOUNTER — TELEPHONE (OUTPATIENT)
Dept: FAMILY MEDICINE | Facility: CLINIC | Age: 54
End: 2019-03-05

## 2019-03-05 RX ORDER — FENTANYL 25 UG/1
1 PATCH TRANSDERMAL SEE ADMIN INSTRUCTIONS
Qty: 10 PATCH | Refills: 0 | Status: SHIPPED | OUTPATIENT
Start: 2019-03-05 | End: 2019-04-09 | Stop reason: SDUPTHER

## 2019-03-05 RX ORDER — FENTANYL 12.5 UG/1
1 PATCH TRANSDERMAL
Qty: 10 PATCH | Refills: 0 | Status: SHIPPED | OUTPATIENT
Start: 2019-03-05 | End: 2019-04-09 | Stop reason: SDUPTHER

## 2019-03-05 RX ORDER — OXYCODONE AND ACETAMINOPHEN 5; 325 MG/1; MG/1
2 TABLET ORAL EVERY 6 HOURS PRN
Qty: 180 TABLET | Refills: 0 | Status: SHIPPED | OUTPATIENT
Start: 2019-03-05 | End: 2019-12-30 | Stop reason: SDUPTHER

## 2019-03-05 NOTE — TELEPHONE ENCOUNTER
Patient called today. She is out of medication. Could you possibly send today. It was ordered yesterday.

## 2019-03-22 ENCOUNTER — OFFICE VISIT (OUTPATIENT)
Dept: FAMILY MEDICINE | Facility: CLINIC | Age: 54
End: 2019-03-22
Payer: COMMERCIAL

## 2019-03-22 VITALS
DIASTOLIC BLOOD PRESSURE: 90 MMHG | OXYGEN SATURATION: 99 % | RESPIRATION RATE: 16 BRPM | HEART RATE: 68 BPM | SYSTOLIC BLOOD PRESSURE: 140 MMHG | TEMPERATURE: 98.7 F | BODY MASS INDEX: 30.86 KG/M2 | HEIGHT: 66 IN | WEIGHT: 192 LBS

## 2019-03-22 DIAGNOSIS — Z13.9 SCREENING FOR CONDITION: ICD-10-CM

## 2019-03-22 DIAGNOSIS — M51.369 DDD (DEGENERATIVE DISC DISEASE), LUMBAR: ICD-10-CM

## 2019-03-22 DIAGNOSIS — J06.9 ACUTE URI: ICD-10-CM

## 2019-03-22 DIAGNOSIS — Z13.29 ENCOUNTER FOR SCREENING FOR ENDOCRINE DISORDER: ICD-10-CM

## 2019-03-22 DIAGNOSIS — F33.1 MDD (MAJOR DEPRESSIVE DISORDER), RECURRENT EPISODE, MODERATE (CMS/HCC): ICD-10-CM

## 2019-03-22 DIAGNOSIS — I10 HYPERTENSION, ESSENTIAL: Primary | ICD-10-CM

## 2019-03-22 DIAGNOSIS — F41.9 ANXIETY: ICD-10-CM

## 2019-03-22 DIAGNOSIS — G89.4 CHRONIC PAIN SYNDROME: ICD-10-CM

## 2019-03-22 PROCEDURE — 99214 OFFICE O/P EST MOD 30 MIN: CPT | Mod: 25 | Performed by: FAMILY MEDICINE

## 2019-03-22 PROCEDURE — 36415 COLL VENOUS BLD VENIPUNCTURE: CPT | Performed by: FAMILY MEDICINE

## 2019-03-22 ASSESSMENT — ENCOUNTER SYMPTOMS
SHORTNESS OF BREATH: 0
RHINORRHEA: 1
WHEEZING: 0

## 2019-03-22 NOTE — PROGRESS NOTES
Subjective      Patient ID: Hilda Cornelius is a 53 y.o. female.    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 See form    Forms back to work    bw today      Needs today    Given gi cocktail    Then ok     A little of breathing    Uri,     Pain up/down  inj helps temporarily with sciatica          The following have been reviewed and updated as appropriate in this visit:  Problems         Past Medical History: She  has a past medical history of Abdominal pain; Anxiety; Back pain; Hypertension; and MDD (major depressive disorder).  Past Surgical History: She  has a past surgical history that includes Tonsillectomy and Knee surgery.    Current Outpatient Prescriptions:   •  albuterol HFA (VENTOLIN HFA) 90 mcg/actuation inhaler, Inhale 2 puffs every 6 (six) hours as needed for wheezing., Disp: 1 Inhaler, Rfl: 1  •  ALPRAZolam (XANAX) 0.5 mg tablet, Take 1 tablet (0.5 mg total) by mouth 3 (three) times a day as needed for anxiety., Disp: 90 tablet, Rfl: 0  •  escitalopram (LEXAPRO) 20 mg tablet, Take 1 tablet (20 mg total) by mouth daily., Disp: 30 tablet, Rfl: 5  •  fentaNYL (DURAGESIC) 12 mcg/hr, Place 1 patch on the skin every 3 (three) days., Disp: 10 patch, Rfl: 0  •  fentaNYL (DURAGESIC) 25 mcg/hr, Place 1 patch on the skin See admin instr. Apply 1 patch topically every 72 hours, Disp: 10 patch, Rfl: 0  •  hydrochlorothiazide (HYDRODIURIL) 25 mg tablet, Take 1 tablet (25 mg total) by mouth daily., Disp: 30 tablet, Rfl: 2  •  lansoprazole (PREVACID) 30 mg capsule, Take 1 capsule (30 mg total) by mouth daily before breakfast., Disp: 30 capsule, Rfl: 2  •  metoprolol succinate XL (TOPROL-XL) 100 mg 24 hr tablet, Take 1 tablet (100 mg total) by mouth daily., Disp: 30 tablet, Rfl: 2  •  olmesartan (BENICAR) 40 mg tablet, Take 1 tablet (40 mg total) by mouth daily., Disp: 30 tablet, Rfl: 6  •   "oxyCODONE-acetaminophen (PERCOCET) 5-325 mg per tablet, Take 2 tablets by mouth every 6 (six) hours as needed for moderate pain., Disp: 180 tablet, Rfl: 0  •  XIIDRA 5 % dropperette dropperette, Administer 1 drop into affected eye(s) 2 (two) times a day., Disp: , Rfl: 0  Allergies: She is allergic to hydrocodone and vitamin b complex.  Social History: She  reports that she has been smoking Cigarettes.  She has never used smokeless tobacco. She reports that she does not drink alcohol or use drugs.    Review of Systems:  Review of Systems   HENT: Positive for congestion, postnasal drip and rhinorrhea.         Mild laryngitis   Respiratory: Negative for shortness of breath and wheezing.    Musculoskeletal:        Chronic back pain up/down pain level   Psychiatric/Behavioral:        Depression/anxety exacerbated by work issues       /90 (BP Location: Left upper arm, Patient Position: Sitting)   Pulse 68   Temp 37.1 °C (98.7 °F) (Oral)   Resp 16   Ht 1.676 m (5' 6\")   Wt 87.1 kg (192 lb)   SpO2 99%   BMI 30.99 kg/m²   Objective     Physical Exam   Constitutional: She is oriented to person, place, and time. She appears well-developed and well-nourished.   HENT:   Head: Normocephalic and atraumatic.   Right Ear: External ear normal.   Left Ear: External ear normal.   Nose: Nose normal.   Clear pnd, scat erythema   Eyes: Conjunctivae and EOM are normal. Pupils are equal, round, and reactive to light. Right eye exhibits no discharge. Left eye exhibits no discharge. No scleral icterus.   Neck: No JVD present. No tracheal deviation present.   Cardiovascular: Normal rate, regular rhythm and normal heart sounds.    Pulmonary/Chest: Effort normal and breath sounds normal. No stridor. No respiratory distress. She has no wheezes. She has no rales.   Abdominal: Bowel sounds are normal.   Musculoskeletal: She exhibits no edema or deformity.   Neurological: She is alert and oriented to person, place, and time. No cranial " nerve deficit. Coordination normal.   Skin: Skin is warm and dry. No erythema. No pallor.   Psychiatric: She has a normal mood and affect. Her behavior is normal. Judgment and thought content normal.   Vitals reviewed.      Assessment/Plan   Problem List Items Addressed This Visit        Other    Anxiety     Mild exacerbation         Hypertension, essential - Primary     Elevation,, monitor for now         MDD (major depressive disorder), recurrent episode, moderate (CMS/HCC) (HCC)     Exacerbation with work stress         DDD (degenerative disc disease), lumbar     Pain stable, usual variations, inj as needed         Chronic pain syndrome     As above, has been stable on meds for the most part for years, w/o se/impairment         Encounter for screening for endocrine disorder    Relevant Orders    TSH 3rd Generation (Completed)    Screening for condition     Routine bw, non fasting         Relevant Orders    CBC (Completed)    Comprehensive metabolic panel (Completed)    Hemoglobin A1c (Completed)    Urinalysis with microscopic (Completed)    Acute URI     Appears viral, synptomatic treatment             No problem-specific Assessment & Plan notes found for this encounter.    Problem List Items Addressed This Visit     None

## 2019-03-23 LAB
ALBUMIN SERPL-MCNC: 4.6 G/DL (ref 3.5–5.5)
ALBUMIN/GLOB SERPL: 1.6 {RATIO} (ref 1.2–2.2)
ALP SERPL-CCNC: 55 IU/L (ref 39–117)
ALT SERPL-CCNC: 27 IU/L (ref 0–32)
APPEARANCE UR: CLEAR
AST SERPL-CCNC: 25 IU/L (ref 0–40)
BACTERIA #/AREA URNS HPF: ABNORMAL /[HPF]
BILIRUB SERPL-MCNC: 0.5 MG/DL (ref 0–1.2)
BILIRUB UR QL STRIP: NEGATIVE
BUN SERPL-MCNC: 18 MG/DL (ref 6–24)
BUN/CREAT SERPL: 26 (ref 9–23)
CALCIUM SERPL-MCNC: 10 MG/DL (ref 8.7–10.2)
CASTS URNS MICRO: ABNORMAL
CASTS URNS QL MICRO: PRESENT /LPF
CHLORIDE SERPL-SCNC: 95 MMOL/L (ref 96–106)
CO2 SERPL-SCNC: 22 MMOL/L (ref 20–29)
COLOR UR: YELLOW
CREAT SERPL-MCNC: 0.68 MG/DL (ref 0.57–1)
EPI CELLS #/AREA URNS HPF: ABNORMAL /HPF
ERYTHROCYTE [DISTWIDTH] IN BLOOD BY AUTOMATED COUNT: 14.4 % (ref 12.3–15.4)
GLOBULIN SER CALC-MCNC: 2.8 G/DL (ref 1.5–4.5)
GLUCOSE SERPL-MCNC: 224 MG/DL (ref 65–99)
GLUCOSE UR QL: NEGATIVE
HBA1C MFR BLD: 8.9 % (ref 4.8–5.6)
HCT VFR BLD AUTO: 43.3 % (ref 34–46.6)
HGB BLD-MCNC: 15.1 G/DL (ref 11.1–15.9)
HGB UR QL STRIP: NEGATIVE
KETONES UR QL STRIP: NEGATIVE
LAB CORP EGFR IF AFRICN AM: 115 ML/MIN/1.73
LAB CORP EGFR IF NONAFRICN AM: 99 ML/MIN/1.73
LEUKOCYTE ESTERASE UR QL STRIP: NEGATIVE
MCH RBC QN AUTO: 31.1 PG (ref 26.6–33)
MCHC RBC AUTO-ENTMCNC: 34.9 G/DL (ref 31.5–35.7)
MCV RBC AUTO: 89 FL (ref 79–97)
MICRO URNS: (no result)
MICRO URNS: (no result)
MUCOUS THREADS URNS QL MICRO: PRESENT
NITRITE UR QL STRIP: NEGATIVE
PH UR STRIP: 5 [PH] (ref 5–7.5)
PLATELET # BLD AUTO: 290 X10E3/UL (ref 150–379)
POTASSIUM SERPL-SCNC: 4 MMOL/L (ref 3.5–5.2)
PROT SERPL-MCNC: 7.4 G/DL (ref 6–8.5)
PROT UR QL STRIP: NEGATIVE
RBC # BLD AUTO: 4.86 X10E6/UL (ref 3.77–5.28)
RBC #/AREA URNS HPF: ABNORMAL /HPF
SODIUM SERPL-SCNC: 136 MMOL/L (ref 134–144)
SP GR UR: 1.02 (ref 1–1.03)
TSH SERPL DL<=0.005 MIU/L-ACNC: 2.06 UIU/ML (ref 0.45–4.5)
UROBILINOGEN UR STRIP-MCNC: 0.2 EU/DL (ref 0.2–1)
WBC # BLD AUTO: 8.1 X10E3/UL (ref 3.4–10.8)
WBC #/AREA URNS HPF: ABNORMAL /HPF

## 2019-03-27 ENCOUNTER — TELEPHONE (OUTPATIENT)
Dept: FAMILY MEDICINE | Facility: CLINIC | Age: 54
End: 2019-03-27

## 2019-03-28 ENCOUNTER — TELEPHONE (OUTPATIENT)
Dept: FAMILY MEDICINE | Facility: CLINIC | Age: 54
End: 2019-03-28

## 2019-03-29 ENCOUNTER — TELEPHONE (OUTPATIENT)
Dept: FAMILY MEDICINE | Facility: CLINIC | Age: 54
End: 2019-03-29

## 2019-03-29 RX ORDER — METFORMIN HYDROCHLORIDE 500 MG/1
500 TABLET ORAL 2 TIMES DAILY WITH MEALS
Qty: 60 TABLET | Refills: 3 | Status: SHIPPED | OUTPATIENT
Start: 2019-03-29 | End: 2019-12-30 | Stop reason: SDUPTHER

## 2019-04-03 ENCOUNTER — TELEPHONE (OUTPATIENT)
Dept: FAMILY MEDICINE | Facility: CLINIC | Age: 54
End: 2019-04-03

## 2019-04-03 NOTE — TELEPHONE ENCOUNTER
Metformin was cancelled at [facility] and called into [facility] for and the patient was notified

## 2019-04-09 ENCOUNTER — OFFICE VISIT (OUTPATIENT)
Dept: FAMILY MEDICINE | Facility: CLINIC | Age: 54
End: 2019-04-09
Payer: COMMERCIAL

## 2019-04-09 VITALS
RESPIRATION RATE: 16 BRPM | BODY MASS INDEX: 31.18 KG/M2 | OXYGEN SATURATION: 99 % | DIASTOLIC BLOOD PRESSURE: 80 MMHG | HEART RATE: 67 BPM | SYSTOLIC BLOOD PRESSURE: 160 MMHG | TEMPERATURE: 98.2 F | WEIGHT: 194 LBS | HEIGHT: 66 IN

## 2019-04-09 DIAGNOSIS — I10 HYPERTENSION, ESSENTIAL: Primary | ICD-10-CM

## 2019-04-09 DIAGNOSIS — G89.4 CHRONIC PAIN SYNDROME: ICD-10-CM

## 2019-04-09 DIAGNOSIS — J98.01 BRONCHOSPASM: ICD-10-CM

## 2019-04-09 DIAGNOSIS — Z12.31 SCREENING MAMMOGRAM, ENCOUNTER FOR: ICD-10-CM

## 2019-04-09 DIAGNOSIS — J06.9 ACUTE URI: ICD-10-CM

## 2019-04-09 DIAGNOSIS — E11.9 TYPE 2 DIABETES MELLITUS WITHOUT COMPLICATION, WITHOUT LONG-TERM CURRENT USE OF INSULIN (CMS/HCC): ICD-10-CM

## 2019-04-09 PROCEDURE — 99214 OFFICE O/P EST MOD 30 MIN: CPT | Performed by: FAMILY MEDICINE

## 2019-04-09 RX ORDER — OXYCODONE AND ACETAMINOPHEN 5; 325 MG/1; MG/1
1 TABLET ORAL EVERY 4 HOURS PRN
Qty: 180 TABLET | Refills: 0 | Status: SHIPPED | OUTPATIENT
Start: 2019-04-09 | End: 2019-05-07 | Stop reason: SDUPTHER

## 2019-04-09 RX ORDER — ALPRAZOLAM 0.5 MG/1
0.5 TABLET ORAL 3 TIMES DAILY PRN
Qty: 90 TABLET | Refills: 0 | Status: SHIPPED | OUTPATIENT
Start: 2019-04-09 | End: 2019-06-06 | Stop reason: SDUPTHER

## 2019-04-09 RX ORDER — FENTANYL 25 UG/1
1 PATCH TRANSDERMAL SEE ADMIN INSTRUCTIONS
Qty: 10 PATCH | Refills: 0 | Status: SHIPPED | OUTPATIENT
Start: 2019-04-09 | End: 2019-05-07 | Stop reason: SDUPTHER

## 2019-04-09 RX ORDER — FENTANYL 12.5 UG/1
1 PATCH TRANSDERMAL
Qty: 10 PATCH | Refills: 0 | Status: SHIPPED | OUTPATIENT
Start: 2019-04-09 | End: 2019-05-07 | Stop reason: SDUPTHER

## 2019-04-09 NOTE — PROGRESS NOTES
Subjective      Patient ID: Hilda Cornelius is a 53 y.o. female.    Form ok for working  metformen    Gi    Change to glipizide  10mg    Hip    Left bursittis    Aleve bid      Uri    Fl wheeze    rx mammogram            The following have been reviewed and updated as appropriate in this visit:  Problems         Past Medical History: She  has a past medical history of Abdominal pain; Anxiety; Back pain; Hypertension; and MDD (major depressive disorder).  Past Surgical History: She  has a past surgical history that includes Tonsillectomy and Knee surgery.    Current Outpatient Prescriptions:   •  albuterol HFA (VENTOLIN HFA) 90 mcg/actuation inhaler, Inhale 2 puffs every 6 (six) hours as needed for wheezing., Disp: 1 Inhaler, Rfl: 1  •  ALPRAZolam (XANAX) 0.5 mg tablet, Take 1 tablet (0.5 mg total) by mouth 3 (three) times a day as needed for anxiety., Disp: 90 tablet, Rfl: 0  •  escitalopram (LEXAPRO) 20 mg tablet, Take 1 tablet (20 mg total) by mouth daily., Disp: 30 tablet, Rfl: 5  •  fentaNYL (DURAGESIC) 12 mcg/hr, Place 1 patch on the skin every 3 (three) days., Disp: 10 patch, Rfl: 0  •  fentaNYL (DURAGESIC) 25 mcg/hr, Place 1 patch on the skin See admin instr. Apply 1 patch topically every 72 hours, Disp: 10 patch, Rfl: 0  •  hydrochlorothiazide (HYDRODIURIL) 25 mg tablet, Take 1 tablet (25 mg total) by mouth daily., Disp: 30 tablet, Rfl: 2  •  lansoprazole (PREVACID) 30 mg capsule, Take 1 capsule (30 mg total) by mouth daily before breakfast., Disp: 30 capsule, Rfl: 2  •  metFORMIN (GLUCOPHAGE) 500 mg tablet, Take 1 tablet (500 mg total) by mouth 2 (two) times a day with meals., Disp: 60 tablet, Rfl: 3  •  metoprolol succinate XL (TOPROL-XL) 100 mg 24 hr tablet, Take 1 tablet (100 mg total) by mouth daily., Disp: 30 tablet, Rfl: 2  •  olmesartan (BENICAR) 40 mg tablet, Take 1 tablet (40 mg total) by mouth daily., Disp: 30 tablet, Rfl: 6  •  XIIDRA 5 % dropperette dropperette, Administer 1 drop into affected  "eye(s) 2 (two) times a day., Disp: , Rfl: 0  Allergies: She is allergic to hydrocodone and vitamin b complex.  Social History: She  reports that she has been smoking Cigarettes.  She has a 20.00 pack-year smoking history. She has never used smokeless tobacco. She reports that she does not drink alcohol or use drugs.    Review of Systems:  Review of Systems   HENT: Positive for congestion, postnasal drip and rhinorrhea.    Respiratory: Positive for cough and wheezing. Negative for shortness of breath.    Gastrointestinal:        Gi se metformen   Musculoskeletal:        Pain at baseline except left hip bursitis   Psychiatric/Behavioral:        Ms at [facility], work note , can perform job on present regimen, has been on for years       BP (!) 160/80 (BP Location: Left upper arm, Patient Position: Sitting)   Pulse 67   Temp 36.8 °C (98.2 °F) (Oral)   Resp 16   Ht 1.676 m (5' 6\")   Wt 88 kg (194 lb)   SpO2 99%   BMI 31.31 kg/m²   Objective     Physical Exam   Constitutional: She is oriented to person, place, and time. She appears well-developed and well-nourished. No distress.   HENT:   Head: Normocephalic and atraumatic.   Right Ear: External ear normal.   Left Ear: External ear normal.   Nose: Nose normal.   Mouth/Throat: Oropharynx is clear and moist.   Eyes: Pupils are equal, round, and reactive to light. Conjunctivae and EOM are normal. Right eye exhibits no discharge. Left eye exhibits no discharge. No scleral icterus.   Neck: No JVD present. No tracheal deviation present.   Cardiovascular: Normal rate, regular rhythm and normal heart sounds.    Pulmonary/Chest: Effort normal and breath sounds normal. No stridor. No respiratory distress. She has no wheezes. She has no rales.   Abdominal: Bowel sounds are normal.   lefft hip bursal tenderness   Musculoskeletal: She exhibits no edema or deformity.   Neurological: She is alert and oriented to person, place, and time. No cranial nerve deficit. Coordination " normal.   Skin: Skin is warm and dry. She is not diaphoretic. No erythema. No pallor.   Psychiatric: She has a normal mood and affect. Her behavior is normal. Judgment and thought content normal.       Assessment/Plan   Problem List Items Addressed This Visit        Nervous    Chronic pain syndrome       Respiratory    Bronchospasm     Ventolin prn, Call/RTO if worsens/persists           Acute URI     Appears viral, monitor, Call/RTO if worsens/persists              Circulatory    Hypertension, essential - Primary     Controlled, continue present regimen              Endocrine/Metabolic    Type 2 diabetes mellitus without complication, without long-term current use of insulin (CMS/[facility])     D/c metformen due to gi, start glipizide            Other    Screening mammogram, encounter for    Relevant Orders    BI SCREENING MAMMOGRAM BILATERAL        No problem-specific Assessment & Plan notes found for this encounter.    Problem List Items Addressed This Visit     None

## 2019-04-15 RX ORDER — HYDROCHLOROTHIAZIDE 25 MG/1
25 TABLET ORAL DAILY
Qty: 30 TABLET | Refills: 2 | Status: SHIPPED | OUTPATIENT
Start: 2019-04-15 | End: 2019-08-12 | Stop reason: SDUPTHER

## 2019-04-22 PROBLEM — Z12.31 SCREENING MAMMOGRAM, ENCOUNTER FOR: Status: ACTIVE | Noted: 2019-04-22

## 2019-04-22 PROBLEM — E11.9 TYPE 2 DIABETES MELLITUS WITHOUT COMPLICATION, WITHOUT LONG-TERM CURRENT USE OF INSULIN (CMS/HCC): Status: ACTIVE | Noted: 2019-04-22

## 2019-04-22 ASSESSMENT — ENCOUNTER SYMPTOMS
COUGH: 1
SHORTNESS OF BREATH: 0
WHEEZING: 1
RHINORRHEA: 1

## 2019-04-29 ENCOUNTER — TELEPHONE (OUTPATIENT)
Dept: FAMILY MEDICINE | Facility: CLINIC | Age: 54
End: 2019-04-29

## 2019-04-29 RX ORDER — LANSOPRAZOLE 30 MG/1
30 CAPSULE, DELAYED RELEASE ORAL
Qty: 30 CAPSULE | Refills: 3 | Status: SHIPPED | OUTPATIENT
Start: 2019-04-29 | End: 2019-11-17 | Stop reason: SDUPTHER

## 2019-04-29 NOTE — TELEPHONE ENCOUNTER
Left message not recommended to take 2 x a day for extended period of time... Try tums PRN,  If no improvement, may change  PPI

## 2019-04-29 NOTE — TELEPHONE ENCOUNTER
Patient requesting refill of Lansoprazole 30mg 1/d  she advised this dose is not lasting 24 hrs would like to have dose changed or can she take 2 /d and sent to  [facility] [address]   Pt contact  [phone]   Last seen 4/9/19

## 2019-05-08 RX ORDER — FENTANYL 25 UG/1
1 PATCH TRANSDERMAL SEE ADMIN INSTRUCTIONS
Qty: 10 PATCH | Refills: 0 | Status: SHIPPED | OUTPATIENT
Start: 2019-05-08 | End: 2019-05-31 | Stop reason: SDUPTHER

## 2019-05-08 RX ORDER — OXYCODONE AND ACETAMINOPHEN 5; 325 MG/1; MG/1
1 TABLET ORAL EVERY 4 HOURS PRN
Qty: 180 TABLET | Refills: 0 | Status: SHIPPED | OUTPATIENT
Start: 2019-05-08 | End: 2019-05-31 | Stop reason: SDUPTHER

## 2019-05-08 RX ORDER — FENTANYL 12.5 UG/1
1 PATCH TRANSDERMAL
Qty: 10 PATCH | Refills: 0 | Status: SHIPPED | OUTPATIENT
Start: 2019-05-08 | End: 2019-05-31 | Stop reason: SDUPTHER

## 2019-05-30 ENCOUNTER — TELEPHONE (OUTPATIENT)
Dept: FAMILY MEDICINE | Facility: CLINIC | Age: 54
End: 2019-05-30

## 2019-05-31 RX ORDER — ATORVASTATIN CALCIUM 20 MG/1
20 TABLET, FILM COATED ORAL DAILY
Qty: 90 TABLET | Refills: 1 | Status: SHIPPED | OUTPATIENT
Start: 2019-05-31 | End: 2019-12-30

## 2019-05-31 RX ORDER — OXYCODONE AND ACETAMINOPHEN 5; 325 MG/1; MG/1
1 TABLET ORAL EVERY 4 HOURS PRN
Qty: 180 TABLET | Refills: 0 | Status: SHIPPED | OUTPATIENT
Start: 2019-05-31 | End: 2019-06-06 | Stop reason: SDUPTHER

## 2019-05-31 RX ORDER — FENTANYL 25 UG/1
1 PATCH TRANSDERMAL SEE ADMIN INSTRUCTIONS
Qty: 10 PATCH | Refills: 0 | Status: SHIPPED | OUTPATIENT
Start: 2019-05-31 | End: 2019-06-06 | Stop reason: SDUPTHER

## 2019-05-31 RX ORDER — FENTANYL 12.5 UG/1
1 PATCH TRANSDERMAL
Qty: 10 PATCH | Refills: 0 | Status: SHIPPED | OUTPATIENT
Start: 2019-05-31 | End: 2019-06-06 | Stop reason: SDUPTHER

## 2019-06-03 ENCOUNTER — TELEPHONE (OUTPATIENT)
Dept: FAMILY MEDICINE | Facility: CLINIC | Age: 54
End: 2019-06-03

## 2019-06-03 NOTE — TELEPHONE ENCOUNTER
Patient would like to talk to you regarding CT scan of lumbar spine done in May of 2018. Report is in EPIC    Please call [phone]

## 2019-06-06 ENCOUNTER — TELEPHONE (OUTPATIENT)
Dept: FAMILY MEDICINE | Facility: CLINIC | Age: 54
End: 2019-06-06

## 2019-06-06 ENCOUNTER — OFFICE VISIT (OUTPATIENT)
Dept: FAMILY MEDICINE | Facility: CLINIC | Age: 54
End: 2019-06-06
Payer: COMMERCIAL

## 2019-06-06 VITALS
BODY MASS INDEX: 30.22 KG/M2 | RESPIRATION RATE: 16 BRPM | SYSTOLIC BLOOD PRESSURE: 150 MMHG | OXYGEN SATURATION: 99 % | HEART RATE: 70 BPM | HEIGHT: 66 IN | DIASTOLIC BLOOD PRESSURE: 82 MMHG | TEMPERATURE: 99 F | WEIGHT: 188 LBS

## 2019-06-06 DIAGNOSIS — N28.89 KIDNEY MASS: Primary | ICD-10-CM

## 2019-06-06 DIAGNOSIS — I10 HYPERTENSION, ESSENTIAL: ICD-10-CM

## 2019-06-06 DIAGNOSIS — F41.9 ANXIETY: ICD-10-CM

## 2019-06-06 DIAGNOSIS — G89.4 CHRONIC PAIN SYNDROME: ICD-10-CM

## 2019-06-06 PROCEDURE — 99214 OFFICE O/P EST MOD 30 MIN: CPT | Performed by: FAMILY MEDICINE

## 2019-06-06 RX ORDER — ALPRAZOLAM 0.5 MG/1
0.5 TABLET ORAL 3 TIMES DAILY PRN
Qty: 90 TABLET | Refills: 0 | Status: SHIPPED | OUTPATIENT
Start: 2019-06-06 | End: 2019-07-12 | Stop reason: SDUPTHER

## 2019-06-06 RX ORDER — FENTANYL 12.5 UG/1
1 PATCH TRANSDERMAL
Qty: 10 PATCH | Refills: 0 | Status: SHIPPED | OUTPATIENT
Start: 2019-06-06 | End: 2019-08-26 | Stop reason: SDUPTHER

## 2019-06-06 RX ORDER — OXYCODONE AND ACETAMINOPHEN 5; 325 MG/1; MG/1
1 TABLET ORAL EVERY 4 HOURS PRN
Qty: 180 TABLET | Refills: 0 | Status: SHIPPED | OUTPATIENT
Start: 2019-06-06 | End: 2019-09-26 | Stop reason: SDUPTHER

## 2019-06-06 RX ORDER — FENTANYL 25 UG/1
1 PATCH TRANSDERMAL SEE ADMIN INSTRUCTIONS
Qty: 10 PATCH | Refills: 0 | Status: SHIPPED | OUTPATIENT
Start: 2019-06-06 | End: 2019-06-28 | Stop reason: SDUPTHER

## 2019-06-06 ASSESSMENT — ENCOUNTER SYMPTOMS
NUMBNESS: 0
BACK PAIN: 1
WEAKNESS: 0

## 2019-06-06 NOTE — PROGRESS NOTES
Subjective      Patient ID: Hilda Cornelius is a 53 y.o. female.    Pulled out back 1 wk  Feels different grinding  Different bad    New imaging    Dr Atwood    Some leg numbness    Kidney mass?, us needed,  Advised pet scan?    Will start with us            The following have been reviewed and updated as appropriate in this visit:  Problems         Past Medical History: She  has a past medical history of Abdominal pain; Anxiety; Back pain; Hypertension; and MDD (major depressive disorder).  Past Surgical History: She  has a past surgical history that includes Tonsillectomy and Knee surgery.    Current Outpatient Prescriptions:   •  atorvastatin (LIPITOR) 20 mg tablet, Take 1 tablet (20 mg total) by mouth daily., Disp: 90 tablet, Rfl: 1  •  fentaNYL (DURAGESIC) 12 mcg/hr, Place 1 patch on the skin every 3 (three) days., Disp: 10 patch, Rfl: 0  •  fentaNYL (DURAGESIC) 25 mcg/hr, Place 1 patch on the skin See admin instr. Apply 1 patch topically every 72 hours, Disp: 10 patch, Rfl: 0  •  hydrochlorothiazide (HYDRODIURIL) 25 mg tablet, Take 1 tablet (25 mg total) by mouth daily., Disp: 30 tablet, Rfl: 2  •  lansoprazole (PREVACID) 30 mg capsule, Take 1 capsule (30 mg total) by mouth daily before breakfast., Disp: 30 capsule, Rfl: 3  •  metoprolol succinate XL (TOPROL-XL) 100 mg 24 hr tablet, Take 1 tablet (100 mg total) by mouth daily., Disp: 30 tablet, Rfl: 2  •  oxyCODONE-acetaminophen (PERCOCET) 5-325 mg per tablet, Take 1 tablet by mouth every 4 (four) hours as needed for moderate pain., Disp: 180 tablet, Rfl: 0  •  XIIDRA 5 % dropperette dropperette, Administer 1 drop into affected eye(s) 2 (two) times a day., Disp: , Rfl: 0  •  albuterol HFA (VENTOLIN HFA) 90 mcg/actuation inhaler, Inhale 2 puffs every 6 (six) hours as needed for wheezing., Disp: 1 Inhaler, Rfl: 1  •  ALPRAZolam (XANAX) 0.5 mg tablet, Take 1 tablet (0.5 mg total) by mouth 3 (three) times a day as needed for anxiety., Disp: 90 tablet, Rfl: 0  •   "escitalopram (LEXAPRO) 20 mg tablet, Take 1 tablet (20 mg total) by mouth daily., Disp: 30 tablet, Rfl: 5  •  metFORMIN (GLUCOPHAGE) 500 mg tablet, Take 1 tablet (500 mg total) by mouth 2 (two) times a day with meals., Disp: 60 tablet, Rfl: 3  •  olmesartan (BENICAR) 40 mg tablet, Take 1 tablet (40 mg total) by mouth daily., Disp: 30 tablet, Rfl: 6  Allergies: She is allergic to hydrocodone and vitamin b complex.  Social History: She  reports that she has been smoking Cigarettes.  She has a 20.00 pack-year smoking history. She has never used smokeless tobacco. She reports that she does not drink alcohol or use drugs.    Review of Systems:  Review of Systems   Musculoskeletal: Positive for back pain.   Neurological: Negative for weakness and numbness.       BP (!) 150/82 (BP Location: Left upper arm, Patient Position: Sitting)   Pulse 70   Temp 37.2 °C (99 °F) (Oral)   Resp 16   Ht 1.676 m (5' 6\")   Wt 85.3 kg (188 lb)   SpO2 99%   BMI 30.34 kg/m²   Objective     Physical Exam   Constitutional: She is oriented to person, place, and time. She appears well-developed and well-nourished. No distress.   HENT:   Head: Normocephalic and atraumatic.   Right Ear: External ear normal.   Left Ear: External ear normal.   Nose: Nose normal.   Mouth/Throat: Oropharynx is clear and moist.   Eyes: Pupils are equal, round, and reactive to light. Conjunctivae and EOM are normal. Right eye exhibits no discharge. Left eye exhibits no discharge. No scleral icterus.   Neck: No JVD present. No tracheal deviation present.   Cardiovascular: Normal rate, regular rhythm and normal heart sounds.    Pulmonary/Chest: Effort normal and breath sounds normal. No stridor. No respiratory distress. She has no wheezes. She has no rales.   Abdominal: Bowel sounds are normal.   Musculoskeletal: She exhibits no edema or deformity.   Neurological: She is alert and oriented to person, place, and time. No cranial nerve deficit. Coordination normal. "   Skin: Skin is warm and dry. She is not diaphoretic. No erythema. No pallor.   Psychiatric: She has a normal mood and affect. Her behavior is normal. Judgment and thought content normal.   Vitals reviewed.      Assessment/Plan   Problem List Items Addressed This Visit        Nervous    Chronic pain syndrome     Cont present meds, f/u orth/surgery            Circulatory    Hypertension, essential     Still elevated will have to adjust meds if remains high            Genitourinary    Kidney mass - Primary     Check us         Relevant Orders    ULTRASOUND KIDNEYS       Other    Anxiety     Cont present regimen for now             No problem-specific Assessment & Plan notes found for this encounter.    Problem List Items Addressed This Visit     None

## 2019-06-06 NOTE — TELEPHONE ENCOUNTER
Refill xanex. Needs to be done today, [place]. Please include refills    Please send to [facility] on [address]

## 2019-06-11 ENCOUNTER — OFFICE VISIT (OUTPATIENT)
Dept: ORTHOPEDICS | Facility: CLINIC | Age: 54
End: 2019-06-11
Payer: COMMERCIAL

## 2019-06-11 VITALS — BODY MASS INDEX: 29.73 KG/M2 | HEIGHT: 66 IN | WEIGHT: 185 LBS

## 2019-06-11 DIAGNOSIS — M51.369 LUMBAR DEGENERATIVE DISC DISEASE: Primary | ICD-10-CM

## 2019-06-11 DIAGNOSIS — M47.816 LUMBAR FACET ARTHROPATHY: ICD-10-CM

## 2019-06-11 DIAGNOSIS — M47.814 SPONDYLOSIS, THORACIC, WITHOUT MYELOPATHY: ICD-10-CM

## 2019-06-11 PROCEDURE — 99203 OFFICE O/P NEW LOW 30 MIN: CPT | Performed by: ORTHOPAEDIC SURGERY

## 2019-06-12 PROBLEM — M47.814 SPONDYLOSIS, THORACIC, WITHOUT MYELOPATHY: Status: ACTIVE | Noted: 2019-06-12

## 2019-06-12 PROBLEM — M47.816 LUMBAR FACET ARTHROPATHY: Status: ACTIVE | Noted: 2019-06-12

## 2019-06-12 PROBLEM — M51.369 LUMBAR DEGENERATIVE DISC DISEASE: Status: ACTIVE | Noted: 2019-06-12

## 2019-06-12 NOTE — PROGRESS NOTES
ORTHOSPINE H&P  Admitting Diagnosis:  No admission diagnoses are documented for this encounter.      CHIEF COMPLAINT : 1.  Low back pain    HPI :     Patient is a 53 y.o. female who presents with a long history of thoracolumbar spine pain with progressive symptoms.  Notes nondermatomal bilateral hip bilateral anterior thigh pain right greater than left.  Notes intermittent thigh numbness articulates subjective weakness as well as a declining walk tolerance.    Medications have consisted of oxycodone and fentanyl.    Physical therapy is provided no relief and injections have resulted in a decreasing benefit.    Pain is currently reported to be sharp shooting aching stabbing in nature.  Pain diagram currently supports her subjective complaints.    Ineffective treatments consisted of chiropractic anti-inflammatories physical therapy while effective treatments are listed as injections medications and antidepressants.    Reports 80% of her pain is in the lower back 20% nondermatomal in the lower extremities.  Denies any improvement with sitting walking or standing and reports no aggravating factors..     Medical History:   Past Medical History:   Diagnosis Date   • Abdominal pain    • Anxiety    • Back pain    • Hypertension    • MDD (major depressive disorder)        Surgical History:   Past Surgical History:   Procedure Laterality Date   • KNEE SURGERY     • TONSILLECTOMY         Social History:   Social History     Social History Narrative   • No narrative on file       Family History:   Family History   Problem Relation Age of Onset   • Heart disease Mother    • No Known Problems Father        Allergies: Hydrocodone and Vitamin b complex       Medication List          Accurate as of 6/11/19 11:59 PM. If you have any questions, ask your nurse or doctor.               CONTINUE taking these medications    albuterol HFA 90 mcg/actuation inhaler  Commonly known as:  VENTOLIN HFA  Inhale 2 puffs every 6 (six) hours as  needed for wheezing.  Dose:  2 puff     ALPRAZolam 0.5 mg tablet  Commonly known as:  XANAX  Take 1 tablet (0.5 mg total) by mouth 3 (three) times a day as needed for anxiety.  Dose:  0.5 mg     atorvastatin 20 mg tablet  Commonly known as:  LIPITOR  Take 1 tablet (20 mg total) by mouth daily.  Dose:  20 mg     escitalopram 20 mg tablet  Commonly known as:  LEXAPRO  Take 1 tablet (20 mg total) by mouth daily.  Dose:  20 mg     * fentaNYL 25 mcg/hr  Commonly known as:  DURAGESIC  Place 1 patch on the skin See admin instr. Apply 1 patch topically every 72 hours  Dose:  1 patch     * fentaNYL 12 mcg/hr  Commonly known as:  DURAGESIC  Place 1 patch on the skin every 3 (three) days.  Dose:  1 patch     hydrochlorothiazide 25 mg tablet  Commonly known as:  HYDRODIURIL  Take 1 tablet (25 mg total) by mouth daily.  Dose:  25 mg     lansoprazole 30 mg capsule  Commonly known as:  PREVACID  Take 1 capsule (30 mg total) by mouth daily before breakfast.  Dose:  30 mg     metFORMIN 500 mg tablet  Commonly known as:  GLUCOPHAGE  Take 1 tablet (500 mg total) by mouth 2 (two) times a day with meals.  Dose:  500 mg     metoprolol succinate  mg 24 hr tablet  Commonly known as:  TOPROL-XL  Take 1 tablet (100 mg total) by mouth daily.  Dose:  100 mg     olmesartan 40 mg tablet  Commonly known as:  BENICAR  Take 1 tablet (40 mg total) by mouth daily.  Dose:  40 mg     oxyCODONE-acetaminophen 5-325 mg per tablet  Commonly known as:  PERCOCET  Take 1 tablet by mouth every 4 (four) hours as needed for moderate pain.  Dose:  1 tablet     XIIDRA 5 % dropperette dropperette  Administer 1 drop into affected eye(s) 2 (two) times a day.  Dose:  1 drop  Generic drug:  lifitegrast        * This list has 2 medication(s) that are the same as other medications prescribed for you. Read the directions carefully, and ask your doctor or other care provider to review them with you.              Review of Systems  All other systems reviewed and  "negative except as noted in the HPI.    Objective       Physical Exam : Ht 1.676 m (5' 6\")   Wt 83.9 kg (185 lb)   BMI 29.86 kg/m²       The patient appears healthy, there stated age, and in no acute distress.  The patient is oriented to person, time, and place.  Gait is nonantalgic, and no assistive device is being utilized for ambulation.  Lumbar spine inspection reveals no tenderness, pain, or muscle spasm.  Range of motion of the lumbar spine is restricted in all planes.  Gross motor testing of the lower extremities from L2-S1 is 5/5 bilaterally with no demonstrable weakness to manual resistance.  Reflexes are 2+ at the patella and Achilles bilaterally.  Pulses are +2 at the dorsalis pedis and posterior tibial region bilaterally.  There is no evidence of any nerve root tension signs in the seated position at 30 and 90° of leg elevation actively and passively bilaterally.  Hip range of motion is full and painless with internal and external rotation.  Knee range of motion is full and painless to extension and flexion.  No distal muscular atrophy was appreciated.      Imaging : Personal view MRI scan of the thoracic spine notes age-appropriate thoracic spondylosis without cord or nerve root compression.    Personal view MRI of the lumbar spine notes multilevel lumbar degenerative disc disease L4-5 L5-S1.  There is a right paracentral spondylotic disc extrusion on the right side displacing the right S1 nerve root.  Underlying facet joint arthropathy is noted.  There is no critical stenosis.  There is no associated instability either in the sagittal or coronal plane by MR imaging.    Report of MRI thoracic and lumbar spine reviewed    Report CT scan lumbar spine notes lumbar spondylosis with degenerative lumbar disc and facet disease.      IMPRESSION : 1.  Thoracic spondylosis without myelopathy  2.  Lumbar degenerative spine disease L4-5 L5-S1  3.  L5-S1 HNP without radiculopathy    PLAN : I have discussed with " the patient that her clinical complaints, physical examination findings, as well as my personal interpretation of the diagnostic studies.  We specifically discussed the natural history, current treatment options, current trend and treatments, surgical, nonsurgical, as well as minimally invasive treatment options specific to their complaints and underlying spinal related diagnosis.    Most importantly, I had a detailed discussion regarding the outcomes of spine surgery when dealing with the specific structural diagnosis and clinical symptoms presented today.    I have also emphasized the importance of being evaluated by a non-spine surgeon particularly their primary care provider to evaluate for non-spinal causes of their current complaints.    Additional clinical follow-up will be coordinated as well as additional diagnostic imaging will be obtained if indicated.  As I have explained, additional testing and perhaps if warranted clinical follow-up may alter the nonsurgical and/or surgical treatment course and algorithm.    The outcomes of nonsurgical and surgical treatment as they relate to lumbar spine axial pain complaints and symptomatology were detailed.  Generally speaking, I discussed the typically poor and unpredictable results following spinal reconstructive surgery.  In fact, I discussed the controversial nature of reconstructing a lumbar spine with a primary and principal complaint of axial pain.  I have outlined my personal preference when dealing with this symptomatology including a focused effort on rehabilitation, spinal flexibility, core strengthening, cardiovascular rehabilitation, avoidance of narcotic utilization, as well as avoiding the over utilization of minimally invasive treatment options that typically have an unpredictable clinical response.  In addition, I outlined the statistics in the spine literature pertaining to a chance of some degree of subjective benefit following lumbar fusion  surgery.  If surgical intervention is contemplated, I have advised a strong understanding of the outcomes, risks, future complications, as well as the medical and functional implications.  In simplistic terms, I compared the outcomes of lumbar spine surgery when dealing with axial pain when compared to leg pain from a presenting and presurgical symptom standpoint.    If indicated, appropriate clinical follow-up will be coordinated through my office.    Also discussed follow-up with her previous physician regarding continued minimally invasive modalities.        Yohannes Atwood MD  6/12/2019  5:41 AM

## 2019-06-17 RX ORDER — METOPROLOL SUCCINATE 100 MG/1
100 TABLET, EXTENDED RELEASE ORAL DAILY
Qty: 90 TABLET | Refills: 1 | Status: SHIPPED | OUTPATIENT
Start: 2019-06-17 | End: 2019-12-30 | Stop reason: SDUPTHER

## 2019-06-28 ENCOUNTER — TELEPHONE (OUTPATIENT)
Dept: FAMILY MEDICINE | Facility: CLINIC | Age: 54
End: 2019-06-28

## 2019-06-28 NOTE — TELEPHONE ENCOUNTER
Pt requesting refill of Oxycodone 5-325mg every 6hrs PRN and fentanyl 12mcg  And fentanyl 25mcg be sen to  [facility] [address] [place]  Pt contact   Last seen 6/6/19

## 2019-06-29 PROBLEM — N28.89 KIDNEY MASS: Status: ACTIVE | Noted: 2019-06-29

## 2019-07-01 NOTE — TELEPHONE ENCOUNTER
Patient states scripts were fill by mail order last month. Spoke to [facility], they say they still have scripts on hold that were never filled from last month and can now be filled.    Not need to send over new scripts.     Scripts last month were sent to mail order on 5/31

## 2019-07-11 RX ORDER — FENTANYL 25 UG/1
1 PATCH TRANSDERMAL SEE ADMIN INSTRUCTIONS
Qty: 10 PATCH | Refills: 0 | Status: SHIPPED | OUTPATIENT
Start: 2019-07-11 | End: 2019-08-26 | Stop reason: SDUPTHER

## 2019-07-11 RX ORDER — OXYCODONE AND ACETAMINOPHEN 5; 325 MG/1; MG/1
2 TABLET ORAL EVERY 6 HOURS PRN
Qty: 180 TABLET | Refills: 0 | Status: SHIPPED | OUTPATIENT
Start: 2019-07-11 | End: 2019-12-30 | Stop reason: SDUPTHER

## 2019-07-12 ENCOUNTER — TELEPHONE (OUTPATIENT)
Dept: FAMILY MEDICINE | Facility: CLINIC | Age: 54
End: 2019-07-12

## 2019-07-13 RX ORDER — ALPRAZOLAM 0.5 MG/1
0.5 TABLET ORAL 3 TIMES DAILY PRN
Qty: 90 TABLET | Refills: 0 | Status: SHIPPED | OUTPATIENT
Start: 2019-07-13 | End: 2019-12-30 | Stop reason: SDUPTHER

## 2019-07-17 RX ORDER — ALPRAZOLAM 0.5 MG/1
0.5 TABLET ORAL 3 TIMES DAILY PRN
Qty: 30 TABLET | Refills: 0 | Status: SHIPPED | OUTPATIENT
Start: 2019-07-17 | End: 2019-08-26 | Stop reason: SDUPTHER

## 2019-07-17 NOTE — TELEPHONE ENCOUNTER
Please send a 10 day supply to local [facility]. [facility] shipped the  #90 today. Patient is out of meds.

## 2019-08-07 DIAGNOSIS — M47.816 LUMBAR FACET ARTHROPATHY: ICD-10-CM

## 2019-08-07 DIAGNOSIS — M47.814 SPONDYLOSIS, THORACIC, WITHOUT MYELOPATHY: ICD-10-CM

## 2019-08-07 DIAGNOSIS — M51.369 DEGENERATIVE DISC DISEASE, LUMBAR: Primary | ICD-10-CM

## 2019-08-07 RX ORDER — FENTANYL 12.5 UG/1
1 PATCH TRANSDERMAL
Qty: 10 PATCH | Refills: 0 | OUTPATIENT
Start: 2019-08-07 | End: 2019-09-06

## 2019-08-07 RX ORDER — FENTANYL 25 UG/1
1 PATCH TRANSDERMAL SEE ADMIN INSTRUCTIONS
Qty: 10 PATCH | Refills: 0 | OUTPATIENT
Start: 2019-08-07 | End: 2019-09-06

## 2019-08-07 RX ORDER — OXYCODONE AND ACETAMINOPHEN 5; 325 MG/1; MG/1
2 TABLET ORAL EVERY 6 HOURS PRN
Qty: 180 TABLET | Refills: 0 | OUTPATIENT
Start: 2019-08-07 | End: 2019-08-17

## 2019-08-07 NOTE — TELEPHONE ENCOUNTER
Medicine Refill Request    Last Office Visit: 6/6/2019  Next Office Visit: 8/20/2019        Current Outpatient Prescriptions:   •  albuterol HFA (VENTOLIN HFA) 90 mcg/actuation inhaler, Inhale 2 puffs every 6 (six) hours as needed for wheezing., Disp: 1 Inhaler, Rfl: 1  •  ALPRAZolam (XANAX) 0.5 mg tablet, Take 1 tablet (0.5 mg total) by mouth 3 (three) times a day as needed for anxiety., Disp: 90 tablet, Rfl: 0  •  ALPRAZolam (XANAX) 0.5 mg tablet, Take 1 tablet (0.5 mg total) by mouth 3 (three) times a day as needed for anxiety., Disp: 30 tablet, Rfl: 0  •  atorvastatin (LIPITOR) 20 mg tablet, Take 1 tablet (20 mg total) by mouth daily., Disp: 90 tablet, Rfl: 1  •  escitalopram (LEXAPRO) 20 mg tablet, Take 1 tablet (20 mg total) by mouth daily., Disp: 30 tablet, Rfl: 5  •  fentaNYL (DURAGESIC) 12 mcg/hr, Place 1 patch on the skin every 3 (three) days., Disp: 10 patch, Rfl: 0  •  fentaNYL (DURAGESIC) 25 mcg/hr, Place 1 patch on the skin See admin instr. Apply 1 patch topically every 72 hours, Disp: 10 patch, Rfl: 0  •  hydrochlorothiazide (HYDRODIURIL) 25 mg tablet, Take 1 tablet (25 mg total) by mouth daily., Disp: 30 tablet, Rfl: 2  •  lansoprazole (PREVACID) 30 mg capsule, Take 1 capsule (30 mg total) by mouth daily before breakfast., Disp: 30 capsule, Rfl: 3  •  metFORMIN (GLUCOPHAGE) 500 mg tablet, Take 1 tablet (500 mg total) by mouth 2 (two) times a day with meals., Disp: 60 tablet, Rfl: 3  •  metoprolol succinate XL (TOPROL-XL) 100 mg 24 hr tablet, Take 1 tablet (100 mg total) by mouth daily., Disp: 90 tablet, Rfl: 1  •  olmesartan (BENICAR) 40 mg tablet, Take 1 tablet (40 mg total) by mouth daily., Disp: 30 tablet, Rfl: 6  •  XIIDRA 5 % dropperette dropperette, Administer 1 drop into affected eye(s) 2 (two) times a day., Disp: , Rfl: 0      BP Readings from Last 3 Encounters:   06/06/19 (!) 150/82   04/09/19 (!) 160/80   03/22/19 140/90       Recent Lab results:  No results found for: CHOL, No results  found for: HDL, No results found for: LDLCALC, No results found for: TRIG     Lab Results   Component Value Date    GLUCOSE 224 (H) 03/22/2019   ,   Lab Results   Component Value Date    HGBA1C 8.9 (H) 03/22/2019         Lab Results   Component Value Date    CREATININE 0.68 03/22/2019       Lab Results   Component Value Date    TSH 2.060 03/22/2019

## 2019-08-12 RX ORDER — HYDROCHLOROTHIAZIDE 25 MG/1
25 TABLET ORAL DAILY
Qty: 30 TABLET | Refills: 2 | Status: SHIPPED | OUTPATIENT
Start: 2019-08-12 | End: 2019-10-18 | Stop reason: SDUPTHER

## 2019-08-12 NOTE — TELEPHONE ENCOUNTER
Medicine Refill Request    Last Office Visit: 6/6/2019  Next Office Visit: 8/20/2019    Last refill  04/15/19  #30 with 2 refills    Current Outpatient Prescriptions:   •  albuterol HFA (VENTOLIN HFA) 90 mcg/actuation inhaler, Inhale 2 puffs every 6 (six) hours as needed for wheezing., Disp: 1 Inhaler, Rfl: 1  •  ALPRAZolam (XANAX) 0.5 mg tablet, Take 1 tablet (0.5 mg total) by mouth 3 (three) times a day as needed for anxiety., Disp: 90 tablet, Rfl: 0  •  ALPRAZolam (XANAX) 0.5 mg tablet, Take 1 tablet (0.5 mg total) by mouth 3 (three) times a day as needed for anxiety., Disp: 30 tablet, Rfl: 0  •  atorvastatin (LIPITOR) 20 mg tablet, Take 1 tablet (20 mg total) by mouth daily., Disp: 90 tablet, Rfl: 1  •  escitalopram (LEXAPRO) 20 mg tablet, Take 1 tablet (20 mg total) by mouth daily., Disp: 30 tablet, Rfl: 5  •  fentaNYL (DURAGESIC) 12 mcg/hr, Place 1 patch on the skin every 3 (three) days., Disp: 10 patch, Rfl: 0  •  fentaNYL (DURAGESIC) 25 mcg/hr, Place 1 patch on the skin See admin instr. Apply 1 patch topically every 72 hours, Disp: 10 patch, Rfl: 0  •  hydrochlorothiazide (HYDRODIURIL) 25 mg tablet, Take 1 tablet (25 mg total) by mouth daily., Disp: 30 tablet, Rfl: 2  •  lansoprazole (PREVACID) 30 mg capsule, Take 1 capsule (30 mg total) by mouth daily before breakfast., Disp: 30 capsule, Rfl: 3  •  metFORMIN (GLUCOPHAGE) 500 mg tablet, Take 1 tablet (500 mg total) by mouth 2 (two) times a day with meals., Disp: 60 tablet, Rfl: 3  •  metoprolol succinate XL (TOPROL-XL) 100 mg 24 hr tablet, Take 1 tablet (100 mg total) by mouth daily., Disp: 90 tablet, Rfl: 1  •  olmesartan (BENICAR) 40 mg tablet, Take 1 tablet (40 mg total) by mouth daily., Disp: 30 tablet, Rfl: 6  •  XIIDRA 5 % dropperette dropperette, Administer 1 drop into affected eye(s) 2 (two) times a day., Disp: , Rfl: 0      BP Readings from Last 3 Encounters:   06/06/19 (!) 150/82   04/09/19 (!) 160/80   03/22/19 140/90       Recent Lab results:  No  results found for: CHOL, No results found for: HDL, No results found for: LDLCALC, No results found for: TRIG     Lab Results   Component Value Date    GLUCOSE 224 (H) 03/22/2019   ,   Lab Results   Component Value Date    HGBA1C 8.9 (H) 03/22/2019         Lab Results   Component Value Date    CREATININE 0.68 03/22/2019       Lab Results   Component Value Date    TSH 2.060 03/22/2019

## 2019-08-12 NOTE — TELEPHONE ENCOUNTER
Patient requesting refill of Hydrochlorothiazide 25mg 1/d be sent to [facility] [address]   Pt contact   Last seen 6/6/19 next appt 8/20/19

## 2019-08-20 ENCOUNTER — OFFICE VISIT (OUTPATIENT)
Dept: FAMILY MEDICINE | Facility: CLINIC | Age: 54
End: 2019-08-20
Payer: COMMERCIAL

## 2019-08-20 VITALS
TEMPERATURE: 98.4 F | RESPIRATION RATE: 16 BRPM | OXYGEN SATURATION: 99 % | SYSTOLIC BLOOD PRESSURE: 120 MMHG | DIASTOLIC BLOOD PRESSURE: 74 MMHG | BODY MASS INDEX: 31.02 KG/M2 | WEIGHT: 193 LBS | HEART RATE: 59 BPM | HEIGHT: 66 IN

## 2019-08-20 DIAGNOSIS — G89.4 CHRONIC PAIN SYNDROME: ICD-10-CM

## 2019-08-20 DIAGNOSIS — H65.191 ACUTE MEE (MIDDLE EAR EFFUSION), RIGHT: Primary | ICD-10-CM

## 2019-08-20 DIAGNOSIS — I10 HYPERTENSION, ESSENTIAL: ICD-10-CM

## 2019-08-20 DIAGNOSIS — N28.89 KIDNEY MASS: ICD-10-CM

## 2019-08-20 PROCEDURE — 99213 OFFICE O/P EST LOW 20 MIN: CPT | Performed by: FAMILY MEDICINE

## 2019-08-20 RX ORDER — METHYLPREDNISOLONE 4 MG/1
TABLET ORAL
Qty: 21 TABLET | Refills: 0 | Status: SHIPPED | OUTPATIENT
Start: 2019-08-20 | End: 2019-08-20 | Stop reason: SDUPTHER

## 2019-08-20 RX ORDER — LIFITEGRAST 50 MG/ML
1 SOLUTION/ DROPS OPHTHALMIC 2 TIMES DAILY
Qty: 60 VIAL | Refills: 3 | Status: SHIPPED | OUTPATIENT
Start: 2019-08-20 | End: 2019-12-30 | Stop reason: ALTCHOICE

## 2019-08-20 RX ORDER — METHYLPREDNISOLONE 4 MG/1
TABLET ORAL
Qty: 21 TABLET | Refills: 0 | Status: SHIPPED | OUTPATIENT
Start: 2019-08-20 | End: 2019-12-30 | Stop reason: ALTCHOICE

## 2019-08-20 NOTE — PROGRESS NOTES
Subjective      Patient ID: Hilda Cornelius is a 54 y.o. female.    Had back study , bad arthritis          Will see urology after wedding in October regarding yus      Ears wonkey    rigtht stinging    xidra eye drop for a year, then stop    xildra      Right ear middle effusion            The following have been reviewed and updated as appropriate in this visit:         Past Medical History: She  has a past medical history of Abdominal pain; Anxiety; Back pain; Hypertension; and MDD (major depressive disorder).  Past Surgical History: She  has a past surgical history that includes Tonsillectomy and Knee surgery.    Current Outpatient Prescriptions:   •  albuterol HFA (VENTOLIN HFA) 90 mcg/actuation inhaler, Inhale 2 puffs every 6 (six) hours as needed for wheezing., Disp: 1 Inhaler, Rfl: 1  •  ALPRAZolam (XANAX) 0.5 mg tablet, Take 1 tablet (0.5 mg total) by mouth 3 (three) times a day as needed for anxiety., Disp: 90 tablet, Rfl: 0  •  ALPRAZolam (XANAX) 0.5 mg tablet, Take 1 tablet (0.5 mg total) by mouth 3 (three) times a day as needed for anxiety., Disp: 30 tablet, Rfl: 0  •  atorvastatin (LIPITOR) 20 mg tablet, Take 1 tablet (20 mg total) by mouth daily., Disp: 90 tablet, Rfl: 1  •  escitalopram (LEXAPRO) 20 mg tablet, Take 1 tablet (20 mg total) by mouth daily., Disp: 30 tablet, Rfl: 5  •  fentaNYL (DURAGESIC) 12 mcg/hr, Place 1 patch on the skin every 3 (three) days., Disp: 10 patch, Rfl: 0  •  fentaNYL (DURAGESIC) 25 mcg/hr, Place 1 patch on the skin See admin instr. Apply 1 patch topically every 72 hours, Disp: 10 patch, Rfl: 0  •  hydrochlorothiazide (HYDRODIURIL) 25 mg tablet, Take 1 tablet (25 mg total) by mouth daily., Disp: 30 tablet, Rfl: 2  •  lansoprazole (PREVACID) 30 mg capsule, Take 1 capsule (30 mg total) by mouth daily before breakfast., Disp: 30 capsule, Rfl: 3  •  metFORMIN (GLUCOPHAGE) 500 mg tablet, Take 1 tablet (500 mg total) by mouth 2 (two) times a day with meals., Disp: 60 tablet,  "Rfl: 3  •  metoprolol succinate XL (TOPROL-XL) 100 mg 24 hr tablet, Take 1 tablet (100 mg total) by mouth daily., Disp: 90 tablet, Rfl: 1  •  olmesartan (BENICAR) 40 mg tablet, Take 1 tablet (40 mg total) by mouth daily., Disp: 30 tablet, Rfl: 6  •  XIIDRA 5 % dropperette dropperette, Administer 1 drop into affected eye(s) 2 (two) times a day., Disp: , Rfl: 0  Allergies: She is allergic to hydrocodone and vitamin b complex.  Social History: She  reports that she has been smoking Cigarettes.  She has a 20.00 pack-year smoking history. She has never used smokeless tobacco. She reports that she does not drink alcohol or use drugs.    Review of Systems:  Review of Systems   HENT:        Right ear congestion   Eyes:        Dry eyes/chronic   Musculoskeletal:        Chronic back pain at baseline       /74 (BP Location: Left upper arm, Patient Position: Sitting)   Pulse (!) 59   Temp 36.9 °C (98.4 °F) (Oral)   Resp 16   Ht 1.676 m (5' 6\")   Wt 87.5 kg (193 lb)   SpO2 99%   BMI 31.15 kg/m²   Objective     Physical Exam   Constitutional: She is oriented to person, place, and time. She appears well-developed and well-nourished. No distress.   HENT:   Head: Normocephalic and atraumatic.   Right Ear: External ear normal.   Left Ear: External ear normal.   Nose: Nose normal.   Mouth/Throat: Oropharynx is clear and moist.   Right tm clear fluid, no erythema   Eyes: Pupils are equal, round, and reactive to light. Conjunctivae and EOM are normal. Right eye exhibits no discharge. Left eye exhibits no discharge. No scleral icterus.   Neck: No JVD present. No tracheal deviation present.   Cardiovascular: Normal rate, regular rhythm and normal heart sounds.    Pulmonary/Chest: Effort normal and breath sounds normal. No stridor. No respiratory distress. She has no wheezes. She has no rales. She exhibits no tenderness.   Abdominal: Bowel sounds are normal.   Musculoskeletal: She exhibits no edema or deformity. "   Neurological: She is oriented to person, place, and time. No cranial nerve deficit. Coordination normal.   Skin: Skin is warm and dry. She is not diaphoretic. No erythema. No pallor.   Psychiatric: She has a normal mood and affect. Her behavior is normal. Judgment and thought content normal.       Assessment/Plan   Problem List Items Addressed This Visit        Nervous    Chronic pain syndrome     Cont present regimen         Acute JACKIE (middle ear effusion), right - Primary     prednisone            Circulatory    Hypertension, essential     Controlled, continue present regimen              Genitourinary    Kidney mass     Defers urology input till after the wedding             No problem-specific Assessment & Plan notes found for this encounter.    Problem List Items Addressed This Visit     None

## 2019-08-26 DIAGNOSIS — F41.9 ANXIETY: ICD-10-CM

## 2019-08-26 DIAGNOSIS — M51.369 DEGENERATION OF LUMBAR INTERVERTEBRAL DISC: Primary | ICD-10-CM

## 2019-08-26 NOTE — TELEPHONE ENCOUNTER
Medicine Refill Request    Last Office Visit: 8/20/2019  Next Office Visit: Visit date not found    Last refill 07/11/19,06/06/19    Current Outpatient Prescriptions:   •  albuterol HFA (VENTOLIN HFA) 90 mcg/actuation inhaler, Inhale 2 puffs every 6 (six) hours as needed for wheezing., Disp: 1 Inhaler, Rfl: 1  •  ALPRAZolam (XANAX) 0.5 mg tablet, Take 1 tablet (0.5 mg total) by mouth 3 (three) times a day as needed for anxiety., Disp: 90 tablet, Rfl: 0  •  ALPRAZolam (XANAX) 0.5 mg tablet, Take 1 tablet (0.5 mg total) by mouth 3 (three) times a day as needed for anxiety., Disp: 30 tablet, Rfl: 0  •  atorvastatin (LIPITOR) 20 mg tablet, Take 1 tablet (20 mg total) by mouth daily., Disp: 90 tablet, Rfl: 1  •  escitalopram (LEXAPRO) 20 mg tablet, Take 1 tablet (20 mg total) by mouth daily., Disp: 30 tablet, Rfl: 5  •  fentaNYL (DURAGESIC) 12 mcg/hr, Place 1 patch on the skin every 3 (three) days., Disp: 10 patch, Rfl: 0  •  fentaNYL (DURAGESIC) 25 mcg/hr, Place 1 patch on the skin See admin instr. Apply 1 patch topically every 72 hours, Disp: 10 patch, Rfl: 0  •  hydrochlorothiazide (HYDRODIURIL) 25 mg tablet, Take 1 tablet (25 mg total) by mouth daily., Disp: 30 tablet, Rfl: 2  •  lansoprazole (PREVACID) 30 mg capsule, Take 1 capsule (30 mg total) by mouth daily before breakfast., Disp: 30 capsule, Rfl: 3  •  lifitegrast (XIIDRA) 5 % dropperette dropperette, Administer 0.05 vials (1 drop total) into both eyes 2 (two) times a day., Disp: 60 vial, Rfl: 3  •  metFORMIN (GLUCOPHAGE) 500 mg tablet, Take 1 tablet (500 mg total) by mouth 2 (two) times a day with meals., Disp: 60 tablet, Rfl: 3  •  methylPREDNISolone (MEDROL DOSEPACK) 4 mg tablet, Follow package directions., Disp: 21 tablet, Rfl: 0  •  metoprolol succinate XL (TOPROL-XL) 100 mg 24 hr tablet, Take 1 tablet (100 mg total) by mouth daily., Disp: 90 tablet, Rfl: 1  •  olmesartan (BENICAR) 40 mg tablet, Take 1 tablet (40 mg total) by mouth daily., Disp: 30 tablet,  Rfl: 6  •  XIIDRA 5 % dropperette dropperette, Administer 0.05 vials (1 drop total) into both eyes 2 (two) times a day., Disp: 60 vial, Rfl: 3      BP Readings from Last 3 Encounters:   08/20/19 120/74   06/06/19 (!) 150/82   04/09/19 (!) 160/80       Recent Lab results:  No results found for: CHOL, No results found for: HDL, No results found for: LDLCALC, No results found for: TRIG     Lab Results   Component Value Date    GLUCOSE 224 (H) 03/22/2019   ,   Lab Results   Component Value Date    HGBA1C 8.9 (H) 03/22/2019         Lab Results   Component Value Date    CREATININE 0.68 03/22/2019       Lab Results   Component Value Date    TSH 2.060 03/22/2019

## 2019-08-26 NOTE — TELEPHONE ENCOUNTER
Patient requesting refill of Alprazolam 0.5mg 3/d PRN, Fentanyl 12.5mcg 1 every 72 hrs,  Fentanyl 25mcg 1 every 72 hrs and  Oxycodone  5-325mg 1 tab every 4hrs PRN be sent to [facility]  [facility]  PT contact   Last seen 8/20/19

## 2019-08-27 RX ORDER — FENTANYL 12.5 UG/1
1 PATCH TRANSDERMAL
Qty: 10 PATCH | Refills: 0 | Status: SHIPPED | OUTPATIENT
Start: 2019-08-27 | End: 2019-09-24 | Stop reason: SDUPTHER

## 2019-08-27 RX ORDER — ALPRAZOLAM 0.5 MG/1
0.5 TABLET ORAL 3 TIMES DAILY PRN
Qty: 30 TABLET | Refills: 0 | Status: SHIPPED | OUTPATIENT
Start: 2019-08-27 | End: 2019-09-24 | Stop reason: SDUPTHER

## 2019-08-27 RX ORDER — OXYCODONE AND ACETAMINOPHEN 5; 325 MG/1; MG/1
2 TABLET ORAL EVERY 6 HOURS PRN
Qty: 180 TABLET | Refills: 0 | Status: SHIPPED | OUTPATIENT
Start: 2019-08-27 | End: 2019-09-24 | Stop reason: SDUPTHER

## 2019-08-27 RX ORDER — FENTANYL 25 UG/1
1 PATCH TRANSDERMAL SEE ADMIN INSTRUCTIONS
Qty: 10 PATCH | Refills: 0 | Status: SHIPPED | OUTPATIENT
Start: 2019-08-27 | End: 2019-09-24 | Stop reason: SDUPTHER

## 2019-09-03 PROBLEM — H65.191 ACUTE MEE (MIDDLE EAR EFFUSION), RIGHT: Status: ACTIVE | Noted: 2019-09-03

## 2019-09-12 ENCOUNTER — TELEPHONE (OUTPATIENT)
Dept: FAMILY MEDICINE | Facility: CLINIC | Age: 54
End: 2019-09-12

## 2019-09-12 RX ORDER — AZITHROMYCIN 250 MG/1
TABLET, FILM COATED ORAL
Qty: 6 TABLET | Refills: 0 | Status: SHIPPED | OUTPATIENT
Start: 2019-09-12 | End: 2019-12-30 | Stop reason: ALTCHOICE

## 2019-09-12 NOTE — TELEPHONE ENCOUNTER
Patient was seen 8/20, finished steroid but says still has pressure and pain behind ears. Asking for antibiotic.- Z pac    Can be reached at [phone]

## 2019-09-24 DIAGNOSIS — F41.9 ANXIETY: ICD-10-CM

## 2019-09-24 DIAGNOSIS — M51.369 DEGENERATION OF LUMBAR INTERVERTEBRAL DISC: ICD-10-CM

## 2019-09-24 RX ORDER — ALPRAZOLAM 0.5 MG/1
0.5 TABLET ORAL 3 TIMES DAILY PRN
Qty: 30 TABLET | Refills: 0 | Status: SHIPPED | OUTPATIENT
Start: 2019-09-24 | End: 2019-09-26 | Stop reason: SDUPTHER

## 2019-09-24 RX ORDER — FENTANYL 12.5 UG/1
1 PATCH TRANSDERMAL
Qty: 10 PATCH | Refills: 0 | Status: SHIPPED | OUTPATIENT
Start: 2019-09-24 | End: 2019-09-26 | Stop reason: SDUPTHER

## 2019-09-24 RX ORDER — OXYCODONE AND ACETAMINOPHEN 5; 325 MG/1; MG/1
2 TABLET ORAL EVERY 6 HOURS PRN
Qty: 180 TABLET | Refills: 0 | Status: SHIPPED | OUTPATIENT
Start: 2019-09-24 | End: 2019-12-30 | Stop reason: SDUPTHER

## 2019-09-24 RX ORDER — FENTANYL 25 UG/1
1 PATCH TRANSDERMAL SEE ADMIN INSTRUCTIONS
Qty: 10 PATCH | Refills: 0 | Status: SHIPPED | OUTPATIENT
Start: 2019-09-24 | End: 2019-09-26 | Stop reason: SDUPTHER

## 2019-09-24 NOTE — TELEPHONE ENCOUNTER
Refill:  Fentanyl patch 25mcg/hr             Fentanyl 12.5 mcg/hr             Oxycodone-Acetaminophen 5-325             Alprazolam 0.5mg

## 2019-09-24 NOTE — TELEPHONE ENCOUNTER
Medicine Refill Request    Last Office Visit: 8/20/2019  Next Office Visit: Visit date not found        Current Outpatient Prescriptions:   •  albuterol HFA (VENTOLIN HFA) 90 mcg/actuation inhaler, Inhale 2 puffs every 6 (six) hours as needed for wheezing., Disp: 1 Inhaler, Rfl: 1  •  ALPRAZolam (XANAX) 0.5 mg tablet, Take 1 tablet (0.5 mg total) by mouth 3 (three) times a day as needed for anxiety., Disp: 90 tablet, Rfl: 0  •  ALPRAZolam (XANAX) 0.5 mg tablet, Take 1 tablet (0.5 mg total) by mouth 3 (three) times a day as needed for anxiety., Disp: 30 tablet, Rfl: 0  •  atorvastatin (LIPITOR) 20 mg tablet, Take 1 tablet (20 mg total) by mouth daily., Disp: 90 tablet, Rfl: 1  •  escitalopram (LEXAPRO) 20 mg tablet, Take 1 tablet (20 mg total) by mouth daily., Disp: 30 tablet, Rfl: 5  •  fentaNYL (DURAGESIC) 12 mcg/hr, Place 1 patch on the skin every 3 (three) days., Disp: 10 patch, Rfl: 0  •  fentaNYL (DURAGESIC) 25 mcg/hr, Place 1 patch on the skin See admin instr. Apply 1 patch topically every 72 hours, Disp: 10 patch, Rfl: 0  •  hydrochlorothiazide (HYDRODIURIL) 25 mg tablet, Take 1 tablet (25 mg total) by mouth daily., Disp: 30 tablet, Rfl: 2  •  lansoprazole (PREVACID) 30 mg capsule, Take 1 capsule (30 mg total) by mouth daily before breakfast., Disp: 30 capsule, Rfl: 3  •  lifitegrast (XIIDRA) 5 % dropperette dropperette, Administer 0.05 vials (1 drop total) into both eyes 2 (two) times a day., Disp: 60 vial, Rfl: 3  •  metFORMIN (GLUCOPHAGE) 500 mg tablet, Take 1 tablet (500 mg total) by mouth 2 (two) times a day with meals., Disp: 60 tablet, Rfl: 3  •  metoprolol succinate XL (TOPROL-XL) 100 mg 24 hr tablet, Take 1 tablet (100 mg total) by mouth daily., Disp: 90 tablet, Rfl: 1  •  olmesartan (BENICAR) 40 mg tablet, Take 1 tablet (40 mg total) by mouth daily., Disp: 30 tablet, Rfl: 6  •  oxyCODONE-acetaminophen (PERCOCET) 5-325 mg per tablet, Take 2 tablets by mouth every 6 (six) hours as needed for severe  pain., Disp: 180 tablet, Rfl: 0  •  XIIDRA 5 % dropperette dropperette, Administer 0.05 vials (1 drop total) into both eyes 2 (two) times a day., Disp: 60 vial, Rfl: 3      BP Readings from Last 3 Encounters:   08/20/19 120/74   06/06/19 (!) 150/82   04/09/19 (!) 160/80       Recent Lab results:  No results found for: CHOL, No results found for: HDL, No results found for: LDLCALC, No results found for: TRIG     Lab Results   Component Value Date    GLUCOSE 224 (H) 03/22/2019   ,   Lab Results   Component Value Date    HGBA1C 8.9 (H) 03/22/2019         Lab Results   Component Value Date    CREATININE 0.68 03/22/2019       Lab Results   Component Value Date    TSH 2.060 03/22/2019

## 2019-09-26 RX ORDER — OXYCODONE AND ACETAMINOPHEN 5; 325 MG/1; MG/1
1 TABLET ORAL EVERY 4 HOURS PRN
Qty: 180 TABLET | Refills: 0 | Status: SHIPPED | OUTPATIENT
Start: 2019-09-26 | End: 2019-10-03 | Stop reason: SDUPTHER

## 2019-09-26 RX ORDER — FENTANYL 12.5 UG/1
1 PATCH TRANSDERMAL
Qty: 10 PATCH | Refills: 0 | Status: SHIPPED | OUTPATIENT
Start: 2019-09-26 | End: 2019-10-03 | Stop reason: SDUPTHER

## 2019-09-26 RX ORDER — ALPRAZOLAM 0.5 MG/1
0.5 TABLET ORAL 3 TIMES DAILY PRN
Qty: 30 TABLET | Refills: 0 | Status: SHIPPED | OUTPATIENT
Start: 2019-09-26 | End: 2019-10-03 | Stop reason: SDUPTHER

## 2019-09-26 RX ORDER — FENTANYL 25 UG/1
1 PATCH TRANSDERMAL SEE ADMIN INSTRUCTIONS
Qty: 10 PATCH | Refills: 0 | Status: SHIPPED | OUTPATIENT
Start: 2019-09-26 | End: 2019-10-03 | Stop reason: SDUPTHER

## 2019-09-26 NOTE — TELEPHONE ENCOUNTER
[facility] and [facility] this [facility] per yessica she want [facility]   .  Thank you

## 2019-10-03 DIAGNOSIS — M51.369 DEGENERATION OF LUMBAR INTERVERTEBRAL DISC: ICD-10-CM

## 2019-10-03 DIAGNOSIS — F41.9 ANXIETY: ICD-10-CM

## 2019-10-03 NOTE — TELEPHONE ENCOUNTER
Spoke to Dhara SALAZAR, patient care technician at [facility]. Cancelled Xanax, percocet, fentanyl patch 12 Mcg and 25 Mcg. Patient does not want any med's sent to [facility].

## 2019-10-03 NOTE — TELEPHONE ENCOUNTER
Spoke with pharmacist Edy at [facility] [address] scripts [facility] - [facility] NOT [facility]    Xanex, percocet, fentanyl patich 12 Mcg fentanyl patch 25 mcg all need to be reordered.    Pharmacist used order sent on 9/26 to fill 7 day supply until [facility] could be [facility] at [facility].     Needs new refills on all 4 scripts sent to [facility] ASAP

## 2019-10-04 RX ORDER — ALPRAZOLAM 0.5 MG/1
0.5 TABLET ORAL 3 TIMES DAILY PRN
Qty: 90 TABLET | Refills: 0 | Status: SHIPPED | OUTPATIENT
Start: 2019-10-04 | End: 2019-10-30 | Stop reason: SDUPTHER

## 2019-10-04 RX ORDER — OXYCODONE AND ACETAMINOPHEN 5; 325 MG/1; MG/1
1 TABLET ORAL EVERY 4 HOURS PRN
Qty: 180 TABLET | Refills: 0 | Status: SHIPPED | OUTPATIENT
Start: 2019-10-04 | End: 2019-10-30 | Stop reason: SDUPTHER

## 2019-10-04 RX ORDER — FENTANYL 25 UG/1
1 PATCH TRANSDERMAL SEE ADMIN INSTRUCTIONS
Qty: 10 PATCH | Refills: 0 | Status: SHIPPED | OUTPATIENT
Start: 2019-10-04 | End: 2019-10-30 | Stop reason: SDUPTHER

## 2019-10-04 RX ORDER — FENTANYL 12.5 UG/1
1 PATCH TRANSDERMAL
Qty: 10 PATCH | Refills: 0 | Status: SHIPPED | OUTPATIENT
Start: 2019-10-04 | End: 2019-10-30 | Stop reason: SDUPTHER

## 2019-10-07 DIAGNOSIS — F41.9 ANXIETY: Primary | ICD-10-CM

## 2019-10-08 NOTE — TELEPHONE ENCOUNTER
Medicine Refill Request    Last Office Visit: 8/20/2019  Next Office Visit: Visit date not found    Last filled 3/20/19    Current Outpatient Prescriptions:   •  albuterol HFA (VENTOLIN HFA) 90 mcg/actuation inhaler, Inhale 2 puffs every 6 (six) hours as needed for wheezing., Disp: 1 Inhaler, Rfl: 1  •  ALPRAZolam (XANAX) 0.5 mg tablet, Take 1 tablet (0.5 mg total) by mouth 3 (three) times a day as needed for anxiety., Disp: 90 tablet, Rfl: 0  •  ALPRAZolam (XANAX) 0.5 mg tablet, Take 1 tablet (0.5 mg total) by mouth 3 (three) times a day as needed for anxiety., Disp: 90 tablet, Rfl: 0  •  atorvastatin (LIPITOR) 20 mg tablet, Take 1 tablet (20 mg total) by mouth daily., Disp: 90 tablet, Rfl: 1  •  escitalopram (LEXAPRO) 20 mg tablet, Take 1 tablet (20 mg total) by mouth daily., Disp: 30 tablet, Rfl: 5  •  fentaNYL (DURAGESIC) 12 mcg/hr, Place 1 patch on the skin every 3 (three) days., Disp: 10 patch, Rfl: 0  •  fentaNYL (DURAGESIC) 25 mcg/hr, Place 1 patch on the skin See admin instr. Apply 1 patch topically every 72 hours, Disp: 10 patch, Rfl: 0  •  hydrochlorothiazide (HYDRODIURIL) 25 mg tablet, Take 1 tablet (25 mg total) by mouth daily., Disp: 30 tablet, Rfl: 2  •  lansoprazole (PREVACID) 30 mg capsule, Take 1 capsule (30 mg total) by mouth daily before breakfast., Disp: 30 capsule, Rfl: 3  •  lifitegrast (XIIDRA) 5 % dropperette dropperette, Administer 0.05 vials (1 drop total) into both eyes 2 (two) times a day., Disp: 60 vial, Rfl: 3  •  metFORMIN (GLUCOPHAGE) 500 mg tablet, Take 1 tablet (500 mg total) by mouth 2 (two) times a day with meals., Disp: 60 tablet, Rfl: 3  •  metoprolol succinate XL (TOPROL-XL) 100 mg 24 hr tablet, Take 1 tablet (100 mg total) by mouth daily., Disp: 90 tablet, Rfl: 1  •  olmesartan (BENICAR) 40 mg tablet, Take 1 tablet (40 mg total) by mouth daily., Disp: 30 tablet, Rfl: 6  •  oxyCODONE-acetaminophen (PERCOCET) 5-325 mg per tablet, Take 2 tablets by mouth every 6 (six) hours as  needed for severe pain., Disp: 180 tablet, Rfl: 0  •  oxyCODONE-acetaminophen (PERCOCET) 5-325 mg per tablet, Take 1 tablet by mouth every 4 (four) hours as needed for moderate pain., Disp: 180 tablet, Rfl: 0  •  XIIDRA 5 % dropperette dropperette, Administer 0.05 vials (1 drop total) into both eyes 2 (two) times a day., Disp: 60 vial, Rfl: 3      BP Readings from Last 3 Encounters:   08/20/19 120/74   06/06/19 (!) 150/82   04/09/19 (!) 160/80       Recent Lab results:  No results found for: CHOL, No results found for: HDL, No results found for: LDLCALC, No results found for: TRIG     Lab Results   Component Value Date    GLUCOSE 224 (H) 03/22/2019   ,   Lab Results   Component Value Date    HGBA1C 8.9 (H) 03/22/2019         Lab Results   Component Value Date    CREATININE 0.68 03/22/2019       Lab Results   Component Value Date    TSH 2.060 03/22/2019

## 2019-10-09 RX ORDER — ESCITALOPRAM OXALATE 20 MG/1
20 TABLET ORAL
Qty: 30 TABLET | Refills: 5 | Status: SHIPPED | OUTPATIENT
Start: 2019-10-09 | End: 2020-05-27 | Stop reason: SDUPTHER

## 2019-10-18 RX ORDER — OLMESARTAN MEDOXOMIL 40 MG/1
40 TABLET ORAL
Qty: 30 TABLET | Refills: 6 | Status: SHIPPED | OUTPATIENT
Start: 2019-10-18 | End: 2020-09-09

## 2019-10-18 RX ORDER — HYDROCHLOROTHIAZIDE 25 MG/1
25 TABLET ORAL
Qty: 30 TABLET | Refills: 2 | Status: SHIPPED | OUTPATIENT
Start: 2019-10-18 | End: 2020-03-11

## 2019-10-18 NOTE — TELEPHONE ENCOUNTER
Medicine Refill Request    Last Office Visit: 8/20/2019  Next Office Visit: Visit date not found        Current Outpatient Medications:   •  albuterol HFA (VENTOLIN HFA) 90 mcg/actuation inhaler, Inhale 2 puffs every 6 (six) hours as needed for wheezing., Disp: 1 Inhaler, Rfl: 1  •  ALPRAZolam (XANAX) 0.5 mg tablet, Take 1 tablet (0.5 mg total) by mouth 3 (three) times a day as needed for anxiety., Disp: 90 tablet, Rfl: 0  •  ALPRAZolam (XANAX) 0.5 mg tablet, Take 1 tablet (0.5 mg total) by mouth 3 (three) times a day as needed for anxiety., Disp: 90 tablet, Rfl: 0  •  atorvastatin (LIPITOR) 20 mg tablet, Take 1 tablet (20 mg total) by mouth daily., Disp: 90 tablet, Rfl: 1  •  escitalopram (LEXAPRO) 20 mg tablet, Take 1 tablet (20 mg total) by mouth once daily., Disp: 30 tablet, Rfl: 5  •  fentaNYL (DURAGESIC) 12 mcg/hr, Place 1 patch on the skin every 3 (three) days., Disp: 10 patch, Rfl: 0  •  fentaNYL (DURAGESIC) 25 mcg/hr, Place 1 patch on the skin See admin instr. Apply 1 patch topically every 72 hours, Disp: 10 patch, Rfl: 0  •  hydrochlorothiazide (HYDRODIURIL) 25 mg tablet, Take 1 tablet (25 mg total) by mouth daily., Disp: 30 tablet, Rfl: 2  •  lansoprazole (PREVACID) 30 mg capsule, Take 1 capsule (30 mg total) by mouth daily before breakfast., Disp: 30 capsule, Rfl: 3  •  lifitegrast (XIIDRA) 5 % dropperette dropperette, Administer 0.05 vials (1 drop total) into both eyes 2 (two) times a day., Disp: 60 vial, Rfl: 3  •  metFORMIN (GLUCOPHAGE) 500 mg tablet, Take 1 tablet (500 mg total) by mouth 2 (two) times a day with meals., Disp: 60 tablet, Rfl: 3  •  metoprolol succinate XL (TOPROL-XL) 100 mg 24 hr tablet, Take 1 tablet (100 mg total) by mouth daily., Disp: 90 tablet, Rfl: 1  •  olmesartan (BENICAR) 40 mg tablet, Take 1 tablet (40 mg total) by mouth daily., Disp: 30 tablet, Rfl: 6  •  oxyCODONE-acetaminophen (PERCOCET) 5-325 mg per tablet, Take 2 tablets by mouth every 6 (six) hours as needed for severe  pain., Disp: 180 tablet, Rfl: 0  •  oxyCODONE-acetaminophen (PERCOCET) 5-325 mg per tablet, Take 1 tablet by mouth every 4 (four) hours as needed for moderate pain., Disp: 180 tablet, Rfl: 0  •  XIIDRA 5 % dropperette dropperette, Administer 0.05 vials (1 drop total) into both eyes 2 (two) times a day., Disp: 60 vial, Rfl: 3      BP Readings from Last 3 Encounters:   08/20/19 120/74   06/06/19 (!) 150/82   04/09/19 (!) 160/80       Recent Lab results:  No results found for: CHOL, No results found for: HDL, No results found for: LDLCALC, No results found for: TRIG     Lab Results   Component Value Date    GLUCOSE 224 (H) 03/22/2019   ,   Lab Results   Component Value Date    HGBA1C 8.9 (H) 03/22/2019         Lab Results   Component Value Date    CREATININE 0.68 03/22/2019       Lab Results   Component Value Date    TSH 2.060 03/22/2019

## 2019-10-30 DIAGNOSIS — F41.9 ANXIETY: ICD-10-CM

## 2019-10-30 DIAGNOSIS — M51.369 DEGENERATION OF LUMBAR INTERVERTEBRAL DISC: ICD-10-CM

## 2019-10-30 NOTE — TELEPHONE ENCOUNTER
Medicine Refill Request    Last Office Visit: 8/20/2019  Next Office Visit: Visit date not found        Current Outpatient Medications:   •  albuterol HFA (VENTOLIN HFA) 90 mcg/actuation inhaler, Inhale 2 puffs every 6 (six) hours as needed for wheezing., Disp: 1 Inhaler, Rfl: 1  •  ALPRAZolam (XANAX) 0.5 mg tablet, Take 1 tablet (0.5 mg total) by mouth 3 (three) times a day as needed for anxiety., Disp: 90 tablet, Rfl: 0  •  ALPRAZolam (XANAX) 0.5 mg tablet, Take 1 tablet (0.5 mg total) by mouth 3 (three) times a day as needed for anxiety., Disp: 90 tablet, Rfl: 0  •  atorvastatin (LIPITOR) 20 mg tablet, Take 1 tablet (20 mg total) by mouth daily., Disp: 90 tablet, Rfl: 1  •  escitalopram (LEXAPRO) 20 mg tablet, Take 1 tablet (20 mg total) by mouth once daily., Disp: 30 tablet, Rfl: 5  •  fentaNYL (DURAGESIC) 12 mcg/hr, Place 1 patch on the skin every 3 (three) days., Disp: 10 patch, Rfl: 0  •  fentaNYL (DURAGESIC) 25 mcg/hr, Place 1 patch on the skin See admin instr. Apply 1 patch topically every 72 hours, Disp: 10 patch, Rfl: 0  •  hydrochlorothiazide (HYDRODIURIL) 25 mg tablet, Take 1 tablet (25 mg total) by mouth once daily., Disp: 30 tablet, Rfl: 2  •  lansoprazole (PREVACID) 30 mg capsule, Take 1 capsule (30 mg total) by mouth daily before breakfast., Disp: 30 capsule, Rfl: 3  •  lifitegrast (XIIDRA) 5 % dropperette dropperette, Administer 0.05 vials (1 drop total) into both eyes 2 (two) times a day., Disp: 60 vial, Rfl: 3  •  metFORMIN (GLUCOPHAGE) 500 mg tablet, Take 1 tablet (500 mg total) by mouth 2 (two) times a day with meals., Disp: 60 tablet, Rfl: 3  •  metoprolol succinate XL (TOPROL-XL) 100 mg 24 hr tablet, Take 1 tablet (100 mg total) by mouth daily., Disp: 90 tablet, Rfl: 1  •  olmesartan (BENICAR) 40 mg tablet, Take 1 tablet (40 mg total) by mouth once daily., Disp: 30 tablet, Rfl: 6  •  oxyCODONE-acetaminophen (PERCOCET) 5-325 mg per tablet, Take 1 tablet by mouth every 4 (four) hours as needed  for moderate pain., Disp: 180 tablet, Rfl: 0  •  XIIDRA 5 % dropperette dropperette, Administer 0.05 vials (1 drop total) into both eyes 2 (two) times a day., Disp: 60 vial, Rfl: 3      BP Readings from Last 3 Encounters:   08/20/19 120/74   06/06/19 (!) 150/82   04/09/19 (!) 160/80       Recent Lab results:  No results found for: CHOL, No results found for: HDL, No results found for: LDLCALC, No results found for: TRIG     Lab Results   Component Value Date    GLUCOSE 224 (H) 03/22/2019   ,   Lab Results   Component Value Date    HGBA1C 8.9 (H) 03/22/2019         Lab Results   Component Value Date    CREATININE 0.68 03/22/2019       Lab Results   Component Value Date    TSH 2.060 03/22/2019

## 2019-11-01 RX ORDER — ALPRAZOLAM 0.5 MG/1
0.5 TABLET ORAL 3 TIMES DAILY PRN
Qty: 90 TABLET | Refills: 0 | Status: SHIPPED | OUTPATIENT
Start: 2019-11-01 | End: 2019-11-25 | Stop reason: SDUPTHER

## 2019-11-01 RX ORDER — FENTANYL 12.5 UG/1
1 PATCH TRANSDERMAL
Qty: 10 PATCH | Refills: 0 | Status: SHIPPED | OUTPATIENT
Start: 2019-11-01 | End: 2019-11-25 | Stop reason: SDUPTHER

## 2019-11-01 RX ORDER — FENTANYL 25 UG/1
1 PATCH TRANSDERMAL SEE ADMIN INSTRUCTIONS
Qty: 10 PATCH | Refills: 0 | Status: SHIPPED | OUTPATIENT
Start: 2019-11-01 | End: 2019-11-25 | Stop reason: SDUPTHER

## 2019-11-01 RX ORDER — OXYCODONE AND ACETAMINOPHEN 5; 325 MG/1; MG/1
1 TABLET ORAL EVERY 4 HOURS PRN
Qty: 180 TABLET | Refills: 0 | Status: SHIPPED | OUTPATIENT
Start: 2019-11-01 | End: 2019-11-25 | Stop reason: SDUPTHER

## 2019-11-18 RX ORDER — LANSOPRAZOLE 30 MG/1
30 CAPSULE, DELAYED RELEASE ORAL
Qty: 30 CAPSULE | Refills: 2 | Status: SHIPPED | OUTPATIENT
Start: 2019-11-18 | End: 2020-04-20

## 2019-11-25 DIAGNOSIS — M51.369 DEGENERATION OF LUMBAR INTERVERTEBRAL DISC: ICD-10-CM

## 2019-11-25 DIAGNOSIS — F41.9 ANXIETY: ICD-10-CM

## 2019-11-25 NOTE — TELEPHONE ENCOUNTER
Refill fentanyl patch 25  Refill fentanyl patch 12.5  Refill oxycodone  Refill xanax    Patient knows early, states due to holiday and states pharmacy won't fill until due

## 2019-11-25 NOTE — TELEPHONE ENCOUNTER
Medicine Refill Request    Last Office Visit: 8/20/2019  Next Office Visit: Visit date not found        Current Outpatient Medications:   •  albuterol HFA (VENTOLIN HFA) 90 mcg/actuation inhaler, Inhale 2 puffs every 6 (six) hours as needed for wheezing., Disp: 1 Inhaler, Rfl: 1  •  ALPRAZolam (XANAX) 0.5 mg tablet, Take 1 tablet (0.5 mg total) by mouth 3 (three) times a day as needed for anxiety., Disp: 90 tablet, Rfl: 0  •  ALPRAZolam (XANAX) 0.5 mg tablet, Take 1 tablet (0.5 mg total) by mouth 3 (three) times a day as needed for anxiety., Disp: 90 tablet, Rfl: 0  •  atorvastatin (LIPITOR) 20 mg tablet, Take 1 tablet (20 mg total) by mouth daily., Disp: 90 tablet, Rfl: 1  •  escitalopram (LEXAPRO) 20 mg tablet, Take 1 tablet (20 mg total) by mouth once daily., Disp: 30 tablet, Rfl: 5  •  fentaNYL (DURAGESIC) 12 mcg/hr, Place 1 patch on the skin every 3 (three) days., Disp: 10 patch, Rfl: 0  •  fentaNYL (DURAGESIC) 25 mcg/hr, Place 1 patch on the skin See admin instr. Apply 1 patch topically every 72 hours, Disp: 10 patch, Rfl: 0  •  hydrochlorothiazide (HYDRODIURIL) 25 mg tablet, Take 1 tablet (25 mg total) by mouth once daily., Disp: 30 tablet, Rfl: 2  •  lansoprazole (PREVACID) 30 mg capsule, Take 1 capsule (30 mg total) by mouth daily before breakfast., Disp: 30 capsule, Rfl: 2  •  lifitegrast (XIIDRA) 5 % dropperette dropperette, Administer 0.05 vials (1 drop total) into both eyes 2 (two) times a day., Disp: 60 vial, Rfl: 3  •  metFORMIN (GLUCOPHAGE) 500 mg tablet, Take 1 tablet (500 mg total) by mouth 2 (two) times a day with meals., Disp: 60 tablet, Rfl: 3  •  metoprolol succinate XL (TOPROL-XL) 100 mg 24 hr tablet, Take 1 tablet (100 mg total) by mouth daily., Disp: 90 tablet, Rfl: 1  •  olmesartan (BENICAR) 40 mg tablet, Take 1 tablet (40 mg total) by mouth once daily., Disp: 30 tablet, Rfl: 6  •  oxyCODONE-acetaminophen (PERCOCET) 5-325 mg per tablet, Take 1 tablet by mouth every 4 (four) hours as needed  for moderate pain., Disp: 180 tablet, Rfl: 0  •  XIIDRA 5 % dropperette dropperette, Administer 0.05 vials (1 drop total) into both eyes 2 (two) times a day., Disp: 60 vial, Rfl: 3      BP Readings from Last 3 Encounters:   08/20/19 120/74   06/06/19 (!) 150/82   04/09/19 (!) 160/80       Recent Lab results:  No results found for: CHOL, No results found for: HDL, No results found for: LDLCALC, No results found for: TRIG     Lab Results   Component Value Date    GLUCOSE 224 (H) 03/22/2019   ,   Lab Results   Component Value Date    HGBA1C 8.9 (H) 03/22/2019         Lab Results   Component Value Date    CREATININE 0.68 03/22/2019       Lab Results   Component Value Date    TSH 2.060 03/22/2019

## 2019-11-27 RX ORDER — ALPRAZOLAM 0.5 MG/1
0.5 TABLET ORAL 3 TIMES DAILY PRN
Qty: 90 TABLET | Refills: 0 | Status: SHIPPED | OUTPATIENT
Start: 2019-11-27 | End: 2019-12-26 | Stop reason: SDUPTHER

## 2019-11-27 RX ORDER — FENTANYL 25 UG/1
1 PATCH TRANSDERMAL SEE ADMIN INSTRUCTIONS
Qty: 10 PATCH | Refills: 0 | Status: SHIPPED | OUTPATIENT
Start: 2019-11-27 | End: 2019-12-26 | Stop reason: SDUPTHER

## 2019-11-27 RX ORDER — OXYCODONE AND ACETAMINOPHEN 5; 325 MG/1; MG/1
1 TABLET ORAL EVERY 4 HOURS PRN
Qty: 180 TABLET | Refills: 0 | Status: SHIPPED | OUTPATIENT
Start: 2019-11-27 | End: 2019-12-30 | Stop reason: SDUPTHER

## 2019-11-27 RX ORDER — FENTANYL 12.5 UG/1
1 PATCH TRANSDERMAL
Qty: 10 PATCH | Refills: 0 | Status: SHIPPED | OUTPATIENT
Start: 2019-11-27 | End: 2019-12-26 | Stop reason: SDUPTHER

## 2019-12-20 DIAGNOSIS — M51.369 DEGENERATION OF LUMBAR INTERVERTEBRAL DISC: ICD-10-CM

## 2019-12-20 DIAGNOSIS — F41.9 ANXIETY: ICD-10-CM

## 2019-12-20 NOTE — TELEPHONE ENCOUNTER
Refill xanax  Refill percocet  Refill fentanyl 12 mcg  rfill fentanyl 25 mcg    Send to [facility] pharmacy on [address]    Told patient due on 12/27 and too early but states with holiday she wanted to get request in now

## 2019-12-20 NOTE — TELEPHONE ENCOUNTER
Medicine Refill Request    Last Office Visit: 8/20/2019  Next Office Visit: 12/26/2019        Current Outpatient Medications:   •  albuterol HFA (VENTOLIN HFA) 90 mcg/actuation inhaler, Inhale 2 puffs every 6 (six) hours as needed for wheezing., Disp: 1 Inhaler, Rfl: 1  •  ALPRAZolam (XANAX) 0.5 mg tablet, Take 1 tablet (0.5 mg total) by mouth 3 (three) times a day as needed for anxiety., Disp: 90 tablet, Rfl: 0  •  ALPRAZolam (XANAX) 0.5 mg tablet, Take 1 tablet (0.5 mg total) by mouth 3 (three) times a day as needed for anxiety., Disp: 90 tablet, Rfl: 0  •  atorvastatin (LIPITOR) 20 mg tablet, Take 1 tablet (20 mg total) by mouth daily., Disp: 90 tablet, Rfl: 1  •  escitalopram (LEXAPRO) 20 mg tablet, Take 1 tablet (20 mg total) by mouth once daily., Disp: 30 tablet, Rfl: 5  •  fentaNYL (DURAGESIC) 12 mcg/hr, Place 1 patch on the skin every 3 (three) days., Disp: 10 patch, Rfl: 0  •  fentaNYL (DURAGESIC) 25 mcg/hr, Place 1 patch on the skin See admin instr. Apply 1 patch topically every 72 hours, Disp: 10 patch, Rfl: 0  •  hydrochlorothiazide (HYDRODIURIL) 25 mg tablet, Take 1 tablet (25 mg total) by mouth once daily., Disp: 30 tablet, Rfl: 2  •  lansoprazole (PREVACID) 30 mg capsule, Take 1 capsule (30 mg total) by mouth daily before breakfast., Disp: 30 capsule, Rfl: 2  •  lifitegrast (XIIDRA) 5 % dropperette dropperette, Administer 0.05 vials (1 drop total) into both eyes 2 (two) times a day., Disp: 60 vial, Rfl: 3  •  metFORMIN (GLUCOPHAGE) 500 mg tablet, Take 1 tablet (500 mg total) by mouth 2 (two) times a day with meals., Disp: 60 tablet, Rfl: 3  •  metoprolol succinate XL (TOPROL-XL) 100 mg 24 hr tablet, Take 1 tablet (100 mg total) by mouth daily., Disp: 90 tablet, Rfl: 1  •  olmesartan (BENICAR) 40 mg tablet, Take 1 tablet (40 mg total) by mouth once daily., Disp: 30 tablet, Rfl: 6  •  oxyCODONE-acetaminophen (PERCOCET) 5-325 mg per tablet, Take 1 tablet by mouth every 4 (four) hours as needed for  moderate pain., Disp: 180 tablet, Rfl: 0  •  XIIDRA 5 % dropperette dropperette, Administer 0.05 vials (1 drop total) into both eyes 2 (two) times a day., Disp: 60 vial, Rfl: 3      BP Readings from Last 3 Encounters:   08/20/19 120/74   06/06/19 (!) 150/82   04/09/19 (!) 160/80       Recent Lab results:  No results found for: CHOL, No results found for: HDL, No results found for: LDLCALC, No results found for: TRIG     Lab Results   Component Value Date    GLUCOSE 224 (H) 03/22/2019   ,   Lab Results   Component Value Date    HGBA1C 8.9 (H) 03/22/2019         Lab Results   Component Value Date    CREATININE 0.68 03/22/2019       Lab Results   Component Value Date    TSH 2.060 03/22/2019         Medicine Refill Request    Last Office Visit: 8/20/2019  Next Office Visit: 12/26/2019        Current Outpatient Medications:   •  albuterol HFA (VENTOLIN HFA) 90 mcg/actuation inhaler, Inhale 2 puffs every 6 (six) hours as needed for wheezing., Disp: 1 Inhaler, Rfl: 1  •  ALPRAZolam (XANAX) 0.5 mg tablet, Take 1 tablet (0.5 mg total) by mouth 3 (three) times a day as needed for anxiety., Disp: 90 tablet, Rfl: 0  •  ALPRAZolam (XANAX) 0.5 mg tablet, Take 1 tablet (0.5 mg total) by mouth 3 (three) times a day as needed for anxiety., Disp: 90 tablet, Rfl: 0  •  atorvastatin (LIPITOR) 20 mg tablet, Take 1 tablet (20 mg total) by mouth daily., Disp: 90 tablet, Rfl: 1  •  escitalopram (LEXAPRO) 20 mg tablet, Take 1 tablet (20 mg total) by mouth once daily., Disp: 30 tablet, Rfl: 5  •  fentaNYL (DURAGESIC) 12 mcg/hr, Place 1 patch on the skin every 3 (three) days., Disp: 10 patch, Rfl: 0  •  fentaNYL (DURAGESIC) 25 mcg/hr, Place 1 patch on the skin See admin instr. Apply 1 patch topically every 72 hours, Disp: 10 patch, Rfl: 0  •  hydrochlorothiazide (HYDRODIURIL) 25 mg tablet, Take 1 tablet (25 mg total) by mouth once daily., Disp: 30 tablet, Rfl: 2  •  lansoprazole (PREVACID) 30 mg capsule, Take 1 capsule (30 mg total) by mouth  daily before breakfast., Disp: 30 capsule, Rfl: 2  •  lifitegrast (XIIDRA) 5 % dropperette dropperette, Administer 0.05 vials (1 drop total) into both eyes 2 (two) times a day., Disp: 60 vial, Rfl: 3  •  metFORMIN (GLUCOPHAGE) 500 mg tablet, Take 1 tablet (500 mg total) by mouth 2 (two) times a day with meals., Disp: 60 tablet, Rfl: 3  •  metoprolol succinate XL (TOPROL-XL) 100 mg 24 hr tablet, Take 1 tablet (100 mg total) by mouth daily., Disp: 90 tablet, Rfl: 1  •  olmesartan (BENICAR) 40 mg tablet, Take 1 tablet (40 mg total) by mouth once daily., Disp: 30 tablet, Rfl: 6  •  oxyCODONE-acetaminophen (PERCOCET) 5-325 mg per tablet, Take 1 tablet by mouth every 4 (four) hours as needed for moderate pain., Disp: 180 tablet, Rfl: 0  •  XIIDRA 5 % dropperette dropperette, Administer 0.05 vials (1 drop total) into both eyes 2 (two) times a day., Disp: 60 vial, Rfl: 3      BP Readings from Last 3 Encounters:   08/20/19 120/74   06/06/19 (!) 150/82   04/09/19 (!) 160/80       Recent Lab results:  No results found for: CHOL, No results found for: HDL, No results found for: LDLCALC, No results found for: TRIG     Lab Results   Component Value Date    GLUCOSE 224 (H) 03/22/2019   ,   Lab Results   Component Value Date    HGBA1C 8.9 (H) 03/22/2019         Lab Results   Component Value Date    CREATININE 0.68 03/22/2019       Lab Results   Component Value Date    TSH 2.060 03/22/2019

## 2019-12-22 RX ORDER — FENTANYL 25 UG/1
1 PATCH TRANSDERMAL SEE ADMIN INSTRUCTIONS
Qty: 10 PATCH | Refills: 0 | OUTPATIENT
Start: 2019-12-22 | End: 2020-01-21

## 2019-12-22 RX ORDER — OXYCODONE AND ACETAMINOPHEN 5; 325 MG/1; MG/1
1 TABLET ORAL EVERY 4 HOURS PRN
Qty: 180 TABLET | Refills: 0 | OUTPATIENT
Start: 2019-12-22 | End: 2020-01-21

## 2019-12-22 RX ORDER — FENTANYL 12.5 UG/1
1 PATCH TRANSDERMAL
Qty: 10 PATCH | Refills: 0 | OUTPATIENT
Start: 2019-12-22 | End: 2020-01-21

## 2019-12-22 RX ORDER — ALPRAZOLAM 0.5 MG/1
0.5 TABLET ORAL 3 TIMES DAILY PRN
Qty: 90 TABLET | Refills: 0 | OUTPATIENT
Start: 2019-12-22

## 2019-12-26 ENCOUNTER — TELEPHONE (OUTPATIENT)
Dept: FAMILY MEDICINE | Facility: CLINIC | Age: 54
End: 2019-12-26

## 2019-12-26 DIAGNOSIS — F41.9 ANXIETY: ICD-10-CM

## 2019-12-26 DIAGNOSIS — M51.369 DEGENERATION OF LUMBAR INTERVERTEBRAL DISC: ICD-10-CM

## 2019-12-26 RX ORDER — FENTANYL 12.5 UG/1
1 PATCH TRANSDERMAL
Qty: 5 PATCH | Refills: 0 | Status: SHIPPED | OUTPATIENT
Start: 2019-12-26 | End: 2019-12-30 | Stop reason: SDUPTHER

## 2019-12-26 RX ORDER — FENTANYL 25 UG/1
1 PATCH TRANSDERMAL SEE ADMIN INSTRUCTIONS
Qty: 10 PATCH | Refills: 0 | Status: CANCELLED | OUTPATIENT
Start: 2019-12-26 | End: 2020-01-25

## 2019-12-26 RX ORDER — FENTANYL 25 UG/1
1 PATCH TRANSDERMAL SEE ADMIN INSTRUCTIONS
Qty: 2 PATCH | Refills: 0 | Status: SHIPPED | OUTPATIENT
Start: 2019-12-26 | End: 2019-12-30 | Stop reason: SDUPTHER

## 2019-12-26 RX ORDER — FENTANYL 12.5 UG/1
1 PATCH TRANSDERMAL
Qty: 10 PATCH | Refills: 0 | Status: CANCELLED | OUTPATIENT
Start: 2019-12-26 | End: 2020-01-25

## 2019-12-26 RX ORDER — ALPRAZOLAM 0.5 MG/1
0.5 TABLET ORAL 3 TIMES DAILY PRN
Qty: 90 TABLET | Refills: 0 | Status: CANCELLED | OUTPATIENT
Start: 2019-12-26 | End: 2020-01-25

## 2019-12-26 RX ORDER — ALPRAZOLAM 0.5 MG/1
0.5 TABLET ORAL 3 TIMES DAILY PRN
Qty: 15 TABLET | Refills: 0 | Status: SHIPPED | OUTPATIENT
Start: 2019-12-26 | End: 2019-12-30 | Stop reason: ALTCHOICE

## 2019-12-26 NOTE — TELEPHONE ENCOUNTER
Medicine Refill Request    Last Office Visit: 8/20/2019  Next Office Visit: Visit date not found  Last filled 11/27/19    Message left for patient to reschedule appointment      Current Outpatient Medications:   •  albuterol HFA (VENTOLIN HFA) 90 mcg/actuation inhaler, Inhale 2 puffs every 6 (six) hours as needed for wheezing., Disp: 1 Inhaler, Rfl: 1  •  ALPRAZolam (XANAX) 0.5 mg tablet, Take 1 tablet (0.5 mg total) by mouth 3 (three) times a day as needed for anxiety., Disp: 90 tablet, Rfl: 0  •  ALPRAZolam (XANAX) 0.5 mg tablet, Take 1 tablet (0.5 mg total) by mouth 3 (three) times a day as needed for anxiety., Disp: 90 tablet, Rfl: 0  •  atorvastatin (LIPITOR) 20 mg tablet, Take 1 tablet (20 mg total) by mouth daily., Disp: 90 tablet, Rfl: 1  •  escitalopram (LEXAPRO) 20 mg tablet, Take 1 tablet (20 mg total) by mouth once daily., Disp: 30 tablet, Rfl: 5  •  fentaNYL (DURAGESIC) 12 mcg/hr, Place 1 patch on the skin every 3 (three) days., Disp: 10 patch, Rfl: 0  •  fentaNYL (DURAGESIC) 25 mcg/hr, Place 1 patch on the skin See admin instr. Apply 1 patch topically every 72 hours, Disp: 10 patch, Rfl: 0  •  hydrochlorothiazide (HYDRODIURIL) 25 mg tablet, Take 1 tablet (25 mg total) by mouth once daily., Disp: 30 tablet, Rfl: 2  •  lansoprazole (PREVACID) 30 mg capsule, Take 1 capsule (30 mg total) by mouth daily before breakfast., Disp: 30 capsule, Rfl: 2  •  lifitegrast (XIIDRA) 5 % dropperette dropperette, Administer 0.05 vials (1 drop total) into both eyes 2 (two) times a day., Disp: 60 vial, Rfl: 3  •  metFORMIN (GLUCOPHAGE) 500 mg tablet, Take 1 tablet (500 mg total) by mouth 2 (two) times a day with meals., Disp: 60 tablet, Rfl: 3  •  metoprolol succinate XL (TOPROL-XL) 100 mg 24 hr tablet, Take 1 tablet (100 mg total) by mouth daily., Disp: 90 tablet, Rfl: 1  •  olmesartan (BENICAR) 40 mg tablet, Take 1 tablet (40 mg total) by mouth once daily., Disp: 30 tablet, Rfl: 6  •  oxyCODONE-acetaminophen (PERCOCET)  5-325 mg per tablet, Take 1 tablet by mouth every 4 (four) hours as needed for moderate pain., Disp: 180 tablet, Rfl: 0  •  XIIDRA 5 % dropperette dropperette, Administer 0.05 vials (1 drop total) into both eyes 2 (two) times a day., Disp: 60 vial, Rfl: 3      BP Readings from Last 3 Encounters:   08/20/19 120/74   06/06/19 (!) 150/82   04/09/19 (!) 160/80       Recent Lab results:  No results found for: CHOL, No results found for: HDL, No results found for: LDLCALC, No results found for: TRIG     Lab Results   Component Value Date    GLUCOSE 224 (H) 03/22/2019   ,   Lab Results   Component Value Date    HGBA1C 8.9 (H) 03/22/2019         Lab Results   Component Value Date    CREATININE 0.68 03/22/2019       Lab Results   Component Value Date    TSH 2.060 03/22/2019

## 2019-12-26 NOTE — TELEPHONE ENCOUNTER
Patient calling to fill scripts Ocycodone-Acetaminophen 5-325  Xanax  Fentanyl 25 mcg  Fentanyl 12 mcg    [facility]

## 2019-12-26 NOTE — TELEPHONE ENCOUNTER
Medicine Refill Request    Last Office Visit: 8/20/2019  Next Office Visit: 12/30/2019  Please send the one week supply of medication, patient will keep appointment on 12/30/19 with you      Current Outpatient Medications:   •  albuterol HFA (VENTOLIN HFA) 90 mcg/actuation inhaler, Inhale 2 puffs every 6 (six) hours as needed for wheezing., Disp: 1 Inhaler, Rfl: 1  •  ALPRAZolam (XANAX) 0.5 mg tablet, Take 1 tablet (0.5 mg total) by mouth 3 (three) times a day as needed for anxiety., Disp: 90 tablet, Rfl: 0  •  ALPRAZolam (XANAX) 0.5 mg tablet, Take 1 tablet (0.5 mg total) by mouth 3 (three) times a day as needed for anxiety., Disp: 90 tablet, Rfl: 0  •  atorvastatin (LIPITOR) 20 mg tablet, Take 1 tablet (20 mg total) by mouth daily., Disp: 90 tablet, Rfl: 1  •  escitalopram (LEXAPRO) 20 mg tablet, Take 1 tablet (20 mg total) by mouth once daily., Disp: 30 tablet, Rfl: 5  •  fentaNYL (DURAGESIC) 12 mcg/hr, Place 1 patch on the skin every 3 (three) days., Disp: 10 patch, Rfl: 0  •  fentaNYL (DURAGESIC) 25 mcg/hr, Place 1 patch on the skin See admin instr. Apply 1 patch topically every 72 hours, Disp: 10 patch, Rfl: 0  •  hydrochlorothiazide (HYDRODIURIL) 25 mg tablet, Take 1 tablet (25 mg total) by mouth once daily., Disp: 30 tablet, Rfl: 2  •  lansoprazole (PREVACID) 30 mg capsule, Take 1 capsule (30 mg total) by mouth daily before breakfast., Disp: 30 capsule, Rfl: 2  •  lifitegrast (XIIDRA) 5 % dropperette dropperette, Administer 0.05 vials (1 drop total) into both eyes 2 (two) times a day., Disp: 60 vial, Rfl: 3  •  metFORMIN (GLUCOPHAGE) 500 mg tablet, Take 1 tablet (500 mg total) by mouth 2 (two) times a day with meals., Disp: 60 tablet, Rfl: 3  •  metoprolol succinate XL (TOPROL-XL) 100 mg 24 hr tablet, Take 1 tablet (100 mg total) by mouth daily., Disp: 90 tablet, Rfl: 1  •  olmesartan (BENICAR) 40 mg tablet, Take 1 tablet (40 mg total) by mouth once daily., Disp: 30 tablet, Rfl: 6  •  oxyCODONE-acetaminophen  (PERCOCET) 5-325 mg per tablet, Take 1 tablet by mouth every 4 (four) hours as needed for moderate pain., Disp: 180 tablet, Rfl: 0  •  XIIDRA 5 % dropperette dropperette, Administer 0.05 vials (1 drop total) into both eyes 2 (two) times a day., Disp: 60 vial, Rfl: 3      BP Readings from Last 3 Encounters:   08/20/19 120/74   06/06/19 (!) 150/82   04/09/19 (!) 160/80       Recent Lab results:  No results found for: CHOL, No results found for: HDL, No results found for: LDLCALC, No results found for: TRIG     Lab Results   Component Value Date    GLUCOSE 224 (H) 03/22/2019   ,   Lab Results   Component Value Date    HGBA1C 8.9 (H) 03/22/2019         Lab Results   Component Value Date    CREATININE 0.68 03/22/2019       Lab Results   Component Value Date    TSH 2.060 03/22/2019

## 2019-12-30 ENCOUNTER — OFFICE VISIT (OUTPATIENT)
Dept: FAMILY MEDICINE | Facility: CLINIC | Age: 54
End: 2019-12-30
Payer: COMMERCIAL

## 2019-12-30 VITALS
RESPIRATION RATE: 16 BRPM | WEIGHT: 191 LBS | HEART RATE: 73 BPM | SYSTOLIC BLOOD PRESSURE: 140 MMHG | TEMPERATURE: 99.2 F | HEIGHT: 65 IN | DIASTOLIC BLOOD PRESSURE: 74 MMHG | OXYGEN SATURATION: 99 % | BODY MASS INDEX: 31.82 KG/M2

## 2019-12-30 DIAGNOSIS — M51.369 DEGENERATION OF LUMBAR INTERVERTEBRAL DISC: ICD-10-CM

## 2019-12-30 DIAGNOSIS — E11.9 TYPE 2 DIABETES MELLITUS WITHOUT COMPLICATION, WITHOUT LONG-TERM CURRENT USE OF INSULIN (CMS/HCC): Primary | ICD-10-CM

## 2019-12-30 PROCEDURE — 99214 OFFICE O/P EST MOD 30 MIN: CPT | Performed by: INTERNAL MEDICINE

## 2019-12-30 RX ORDER — METOPROLOL SUCCINATE 100 MG/1
100 TABLET, EXTENDED RELEASE ORAL DAILY
Qty: 90 TABLET | Refills: 1 | Status: SHIPPED | OUTPATIENT
Start: 2019-12-30 | End: 2020-04-20

## 2019-12-30 RX ORDER — METFORMIN HYDROCHLORIDE 500 MG/1
500 TABLET ORAL 2 TIMES DAILY WITH MEALS
Qty: 60 TABLET | Refills: 3 | Status: SHIPPED | OUTPATIENT
Start: 2019-12-30 | End: 2020-10-01

## 2019-12-30 RX ORDER — ALPRAZOLAM 0.5 MG/1
0.5 TABLET ORAL 3 TIMES DAILY PRN
Qty: 90 TABLET | Refills: 0 | Status: SHIPPED | OUTPATIENT
Start: 2019-12-30 | End: 2020-01-23 | Stop reason: SDUPTHER

## 2019-12-30 RX ORDER — SULFAMETHOXAZOLE AND TRIMETHOPRIM 800; 160 MG/1; MG/1
1 TABLET ORAL 2 TIMES DAILY
Qty: 6 TABLET | Refills: 0 | Status: SHIPPED | OUTPATIENT
Start: 2019-12-30 | End: 2020-01-02

## 2019-12-30 RX ORDER — FENTANYL 25 UG/1
1 PATCH TRANSDERMAL SEE ADMIN INSTRUCTIONS
Qty: 10 PATCH | Refills: 0 | Status: SHIPPED | OUTPATIENT
Start: 2019-12-30 | End: 2020-01-23 | Stop reason: SDUPTHER

## 2019-12-30 RX ORDER — FENTANYL 12.5 UG/1
1 PATCH TRANSDERMAL
Qty: 5 PATCH | Refills: 0 | Status: SHIPPED | OUTPATIENT
Start: 2019-12-30 | End: 2020-01-23 | Stop reason: SDUPTHER

## 2019-12-30 RX ORDER — OXYCODONE AND ACETAMINOPHEN 5; 325 MG/1; MG/1
2 TABLET ORAL EVERY 6 HOURS PRN
Qty: 180 TABLET | Refills: 0 | Status: SHIPPED | OUTPATIENT
Start: 2019-12-30 | End: 2020-01-23 | Stop reason: SDUPTHER

## 2019-12-31 LAB
ALBUMIN SERPL-MCNC: 4.6 G/DL (ref 3.5–5.5)
ALBUMIN/CREAT UR: 34.2 MG/G CREAT (ref 0–30)
ALBUMIN/GLOB SERPL: 2 {RATIO} (ref 1.2–2.2)
ALP SERPL-CCNC: 50 IU/L (ref 39–117)
ALT SERPL-CCNC: 15 IU/L (ref 0–32)
AST SERPL-CCNC: 13 IU/L (ref 0–40)
BASOPHILS # BLD AUTO: 0.1 X10E3/UL (ref 0–0.2)
BASOPHILS NFR BLD AUTO: 1 %
BILIRUB SERPL-MCNC: 0.3 MG/DL (ref 0–1.2)
BUN SERPL-MCNC: 15 MG/DL (ref 6–24)
BUN/CREAT SERPL: 26 (ref 9–23)
CALCIUM SERPL-MCNC: 9.9 MG/DL (ref 8.7–10.2)
CHLORIDE SERPL-SCNC: 99 MMOL/L (ref 96–106)
CHOLEST SERPL-MCNC: 218 MG/DL (ref 100–199)
CO2 SERPL-SCNC: 20 MMOL/L (ref 20–29)
CREAT SERPL-MCNC: 0.57 MG/DL (ref 0.57–1)
CREAT UR-MCNC: 29.8 MG/DL
EOSINOPHIL # BLD AUTO: 0.2 X10E3/UL (ref 0–0.4)
EOSINOPHIL NFR BLD AUTO: 2 %
ERYTHROCYTE [DISTWIDTH] IN BLOOD BY AUTOMATED COUNT: 13.8 % (ref 12.3–15.4)
GLOBULIN SER CALC-MCNC: 2.3 G/DL (ref 1.5–4.5)
GLUCOSE SERPL-MCNC: 138 MG/DL (ref 65–99)
HBA1C MFR BLD: 6.9 % (ref 4.8–5.6)
HCT VFR BLD AUTO: 41.6 % (ref 34–46.6)
HDLC SERPL-MCNC: 44 MG/DL
HGB BLD-MCNC: 14.6 G/DL (ref 11.1–15.9)
IMM GRANULOCYTES # BLD AUTO: 0.1 X10E3/UL (ref 0–0.1)
IMM GRANULOCYTES NFR BLD AUTO: 1 %
LAB CORP EGFR IF AFRICN AM: 121 ML/MIN/1.73
LAB CORP EGFR IF NONAFRICN AM: 105 ML/MIN/1.73
LDLC SERPL CALC-MCNC: 108 MG/DL (ref 0–99)
LYMPHOCYTES # BLD AUTO: 5.7 X10E3/UL (ref 0.7–3.1)
LYMPHOCYTES NFR BLD AUTO: 43 %
MCH RBC QN AUTO: 31.5 PG (ref 26.6–33)
MCHC RBC AUTO-ENTMCNC: 35.1 G/DL (ref 31.5–35.7)
MCV RBC AUTO: 90 FL (ref 79–97)
MICROALBUMIN UR-MCNC: 10.2 UG/ML
MONOCYTES # BLD AUTO: 1.1 X10E3/UL (ref 0.1–0.9)
MONOCYTES NFR BLD AUTO: 9 %
NEUTROPHILS # BLD AUTO: 5.5 X10E3/UL (ref 1.4–7)
NEUTROPHILS NFR BLD AUTO: 44 %
PLATELET # BLD AUTO: 325 X10E3/UL (ref 150–450)
POTASSIUM SERPL-SCNC: 3.7 MMOL/L (ref 3.5–5.2)
PROT SERPL-MCNC: 6.9 G/DL (ref 6–8.5)
RBC # BLD AUTO: 4.63 X10E6/UL (ref 3.77–5.28)
SODIUM SERPL-SCNC: 140 MMOL/L (ref 134–144)
TRIGL SERPL-MCNC: 332 MG/DL (ref 0–149)
TSH SERPL DL<=0.005 MIU/L-ACNC: 4.89 UIU/ML (ref 0.45–4.5)
VLDLC SERPL CALC-MCNC: 66 MG/DL (ref 5–40)
WBC # BLD AUTO: 12.6 X10E3/UL (ref 3.4–10.8)

## 2020-01-01 ASSESSMENT — ENCOUNTER SYMPTOMS
NEUROLOGICAL NEGATIVE: 1
HEMATOLOGIC/LYMPHATIC NEGATIVE: 1
NECK STIFFNESS: 1
CARDIOVASCULAR NEGATIVE: 1
RESPIRATORY NEGATIVE: 1
GASTROINTESTINAL NEGATIVE: 1
NERVOUS/ANXIOUS: 1
CONSTITUTIONAL NEGATIVE: 1
EYES NEGATIVE: 1
ENDOCRINE NEGATIVE: 1
DYSPHORIC MOOD: 1
NECK PAIN: 1
ALLERGIC/IMMUNOLOGIC NEGATIVE: 1
BACK PAIN: 1
ARTHRALGIAS: 1

## 2020-01-02 NOTE — PROGRESS NOTES
"Subjective    Back Pain    Anxiety    Med Management           Patient ID: Hilda Cornelius is a 54 y.o. female.  1965      Back Pain ,Anxiety,  Med Management           The following have been reviewed and updated as appropriate in this visit:  Tobacco  Allergies  Meds  Problems  Surg Hx  Fam Hx       Review of Systems   Constitutional: Negative.    HENT: Negative.    Eyes: Negative.    Respiratory: Negative.    Cardiovascular: Negative.    Gastrointestinal: Negative.    Endocrine: Negative.    Genitourinary: Negative.    Musculoskeletal: Positive for arthralgias, back pain, neck pain and neck stiffness.   Skin: Negative.    Allergic/Immunologic: Negative.    Neurological: Negative.    Hematological: Negative.    Psychiatric/Behavioral: Positive for dysphoric mood. The patient is nervous/anxious.        Objective     Vitals:    12/30/19 1250   BP: 140/74   BP Location: Left upper arm   Patient Position: Sitting   Pulse: 73   Resp: 16   Temp: 37.3 °C (99.2 °F)   TempSrc: Oral   SpO2: 99%   Weight: 86.6 kg (191 lb)   Height: 1.651 m (5' 5\")     Body mass index is 31.78 kg/m².    Physical Exam   Constitutional: She is oriented to person, place, and time. She appears well-developed and well-nourished.   HENT:   Head: Normocephalic and atraumatic.   Right Ear: External ear normal.   Left Ear: External ear normal.   Nose: Nose normal.   Mouth/Throat: Oropharynx is clear and moist.   Eyes: Pupils are equal, round, and reactive to light. Conjunctivae and EOM are normal.   Neck: Normal range of motion. Neck supple.   Cardiovascular: Normal rate, regular rhythm, normal heart sounds and intact distal pulses. Exam reveals no gallop.   No murmur heard.  Pulmonary/Chest: Effort normal and breath sounds normal.   Abdominal: Soft. Bowel sounds are normal.   Musculoskeletal: Normal range of motion. She exhibits no edema.   Tenderness, muscle and joints   Neurological: She is alert and oriented to person, place, and time. " No cranial nerve deficit.   Skin: Skin is warm and dry. Capillary refill takes less than 2 seconds.   Psychiatric: She has a normal mood and affect. Her behavior is normal. Judgment and thought content normal.   Nursing note and vitals reviewed.      Assessment/Plan   Diagnoses and all orders for this visit:    Type 2 diabetes mellitus without complication, without long-term current use of insulin (CMS/[facility]) (Primary)  Assessment & Plan:  Patient with past medical history of type 2 diabetes on metformin 500 mg twice daily she admits that the holidays have been tough and she has been consuming more concentrated sweets than usual will check, and discuss future plans after results are written    Orders:  -     CBC and Differential  -     Comprehensive metabolic panel  -     Hemoglobin A1c  -     Lipid panel  -     Microalbumin/Creatinine Ur Random  -     TSH 3rd Generation    Degeneration of lumbar intervertebral disc  Assessment & Plan:  Patient with past medical history of degenerative disc disease multiple injections over the years on chronic pain medication recently found on MRI that had her had an MRI and 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 Apply 1 patch topically every 72 hours  -     fentaNYL (DURAGESIC) 12 mcg/hr; Place 1 patch on the skin every 3 (three) days for 15 days.    Other orders  -     sulfamethoxazole-trimethoprim (BACTRIM DS) 800-160 mg per tablet; Take 1 tablet by mouth 2 (two) times a day for 3 days.  -     oxyCODONE-acetaminophen (PERCOCET) 5-325 mg per tablet; Take 2 tablets by mouth every 6 (six) hours as needed for moderate pain.  -     ALPRAZolam (XANAX) 0.5 mg tablet; Take 1 tablet (0.5 mg total) by mouth 3 (three) times a day as needed for anxiety.  -     metoprolol succinate XL (TOPROL-XL) 100 mg 24 hr tablet; Take 1  tablet (100 mg total) by mouth daily.  -     metFORMIN (GLUCOPHAGE) 500 mg tablet; Take 1 tablet (500 mg total) by mouth 2 (two) times a day with meals.

## 2020-01-02 NOTE — ASSESSMENT & PLAN NOTE
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

## 2020-01-02 NOTE — ASSESSMENT & PLAN NOTE
Patient with past medical history of type 2 diabetes on metformin 500 mg twice daily she admits that the holidays have been tough and she has been consuming more concentrated sweets than usual will check, and discuss future plans after results are written

## 2020-01-06 ENCOUNTER — TELEPHONE (OUTPATIENT)
Dept: FAMILY MEDICINE | Facility: CLINIC | Age: 55
End: 2020-01-06

## 2020-01-06 NOTE — TELEPHONE ENCOUNTER
Left message for patient regarding blood work. TSH should be repeated in about 6 months to confirm. As well as urine for micro albumin , she made a significant improvement in her HGA1C. Told to follow up with Dr. Phan in about 6 months.

## 2020-01-23 DIAGNOSIS — M51.369 DEGENERATION OF LUMBAR INTERVERTEBRAL DISC: ICD-10-CM

## 2020-01-23 RX ORDER — FENTANYL 12.5 UG/1
1 PATCH TRANSDERMAL
Qty: 5 PATCH | Refills: 0 | Status: SHIPPED | OUTPATIENT
Start: 2020-01-23 | End: 2020-02-24 | Stop reason: SDUPTHER

## 2020-01-23 RX ORDER — OXYCODONE AND ACETAMINOPHEN 5; 325 MG/1; MG/1
2 TABLET ORAL EVERY 6 HOURS PRN
Qty: 180 TABLET | Refills: 0 | Status: SHIPPED | OUTPATIENT
Start: 2020-01-23 | End: 2020-02-24 | Stop reason: SDUPTHER

## 2020-01-23 RX ORDER — FENTANYL 25 UG/1
1 PATCH TRANSDERMAL SEE ADMIN INSTRUCTIONS
Qty: 10 PATCH | Refills: 0 | Status: SHIPPED | OUTPATIENT
Start: 2020-01-23 | End: 2020-02-24 | Stop reason: SDUPTHER

## 2020-01-23 RX ORDER — ALPRAZOLAM 0.5 MG/1
0.5 TABLET ORAL 3 TIMES DAILY PRN
Qty: 90 TABLET | Refills: 0 | Status: SHIPPED | OUTPATIENT
Start: 2020-01-23 | End: 2020-02-24 | Stop reason: SDUPTHER

## 2020-01-23 NOTE — TELEPHONE ENCOUNTER
Medicine Refill Request    Last Office Visit: 12/30/2019  Next Office Visit: Visit date not found        Current Outpatient Medications:   •  ALPRAZolam (XANAX) 0.5 mg tablet, Take 1 tablet (0.5 mg total) by mouth 3 (three) times a day as needed for anxiety., Disp: 90 tablet, Rfl: 0  •  escitalopram (LEXAPRO) 20 mg tablet, Take 1 tablet (20 mg total) by mouth once daily., Disp: 30 tablet, Rfl: 5  •  fentaNYL (DURAGESIC) 12 mcg/hr, Place 1 patch on the skin every 3 (three) days for 15 days., Disp: 5 patch, Rfl: 0  •  fentaNYL (DURAGESIC) 25 mcg/hr, Place 1 patch on the skin See admin instr. Apply 1 patch topically every 72 hours, Disp: 10 patch, Rfl: 0  •  hydrochlorothiazide (HYDRODIURIL) 25 mg tablet, Take 1 tablet (25 mg total) by mouth once daily., Disp: 30 tablet, Rfl: 2  •  lansoprazole (PREVACID) 30 mg capsule, Take 1 capsule (30 mg total) by mouth daily before breakfast., Disp: 30 capsule, Rfl: 2  •  lifitegrast (XIIDRA) 5 % dropperette dropperette, Administer 0.05 vials (1 drop total) into both eyes 2 (two) times a day., Disp: 60 vial, Rfl: 3  •  metFORMIN (GLUCOPHAGE) 500 mg tablet, Take 1 tablet (500 mg total) by mouth 2 (two) times a day with meals., Disp: 60 tablet, Rfl: 3  •  metoprolol succinate XL (TOPROL-XL) 100 mg 24 hr tablet, Take 1 tablet (100 mg total) by mouth daily., Disp: 90 tablet, Rfl: 1  •  olmesartan (BENICAR) 40 mg tablet, Take 1 tablet (40 mg total) by mouth once daily., Disp: 30 tablet, Rfl: 6  •  oxyCODONE-acetaminophen (PERCOCET) 5-325 mg per tablet, Take 2 tablets by mouth every 6 (six) hours as needed for moderate pain., Disp: 180 tablet, Rfl: 0      BP Readings from Last 3 Encounters:   12/30/19 140/74   08/20/19 120/74   06/06/19 (!) 150/82       Recent Lab results:  Lab Results   Component Value Date    CHOL 218 (H) 12/30/2019   ,   Lab Results   Component Value Date    HDL 44 12/30/2019   ,   Lab Results   Component Value Date    LDLCALC 108 (H) 12/30/2019   ,   Lab Results    Component Value Date    TRIG 332 (H) 12/30/2019        Lab Results   Component Value Date    GLUCOSE 138 (H) 12/30/2019   ,   Lab Results   Component Value Date    HGBA1C 6.9 (H) 12/30/2019         Lab Results   Component Value Date    CREATININE 0.57 12/30/2019       Lab Results   Component Value Date    TSH 4.890 (H) 12/30/2019

## 2020-01-23 NOTE — TELEPHONE ENCOUNTER
Refill:   Fentanyl 12mcg/hr              Fentanyl 25mcg/hr              Oxycodone -acetaminophen 5-325              Alprazolam 0.5 mg TID

## 2020-01-29 ENCOUNTER — OFFICE VISIT (OUTPATIENT)
Dept: FAMILY MEDICINE | Facility: CLINIC | Age: 55
End: 2020-01-29
Payer: COMMERCIAL

## 2020-01-29 VITALS
BODY MASS INDEX: 31.02 KG/M2 | SYSTOLIC BLOOD PRESSURE: 126 MMHG | WEIGHT: 193 LBS | RESPIRATION RATE: 16 BRPM | HEART RATE: 59 BPM | OXYGEN SATURATION: 94 % | TEMPERATURE: 98 F | HEIGHT: 66 IN | DIASTOLIC BLOOD PRESSURE: 78 MMHG

## 2020-01-29 DIAGNOSIS — J01.00 ACUTE NON-RECURRENT MAXILLARY SINUSITIS: Primary | ICD-10-CM

## 2020-01-29 DIAGNOSIS — H10.33 ACUTE BACTERIAL CONJUNCTIVITIS OF BOTH EYES: ICD-10-CM

## 2020-01-29 PROCEDURE — 99214 OFFICE O/P EST MOD 30 MIN: CPT | Performed by: PHYSICIAN ASSISTANT

## 2020-01-29 RX ORDER — AMOXICILLIN AND CLAVULANATE POTASSIUM 875; 125 MG/1; MG/1
1 TABLET, FILM COATED ORAL 2 TIMES DAILY
Qty: 20 TABLET | Refills: 0 | Status: SHIPPED | OUTPATIENT
Start: 2020-01-29 | End: 2020-02-08

## 2020-01-29 RX ORDER — ERYTHROMYCIN 5 MG/G
OINTMENT OPHTHALMIC
Qty: 3.5 G | Refills: 1 | Status: SHIPPED | OUTPATIENT
Start: 2020-01-29 | End: 2020-02-28

## 2020-01-29 ASSESSMENT — ENCOUNTER SYMPTOMS
SORE THROAT: 1
PSYCHIATRIC NEGATIVE: 1
MUSCULOSKELETAL NEGATIVE: 1
CHILLS: 1
EYE DISCHARGE: 1
GASTROINTESTINAL NEGATIVE: 1
FATIGUE: 1
NEUROLOGICAL NEGATIVE: 1
CARDIOVASCULAR NEGATIVE: 1
RESPIRATORY NEGATIVE: 1

## 2020-01-29 NOTE — PROGRESS NOTES
Patient ID: Hilda Cornelius is a 54 y.o. female.    Subjective     Presents with right earache, eyes crusty, facial pain + PND      The following have been reviewed and updated as appropriate in this visit:    Allergies  Meds  Problems         Review of Systems   Constitutional: Positive for chills and fatigue.   HENT: Positive for ear pain, postnasal drip and sore throat.    Eyes: Positive for discharge.   Respiratory: Negative.    Cardiovascular: Negative.    Gastrointestinal: Negative.    Genitourinary: Negative.    Musculoskeletal: Negative.    Neurological: Negative.    Psychiatric/Behavioral: Negative.        Patient's Medications   New Prescriptions    AMOXICILLIN-POT CLAVULANATE (AUGMENTIN) 875-125 MG PER TABLET    Take 1 tablet by mouth 2 (two) times a day for 10 days.    ERYTHROMYCIN (ILOTYCIN) 5 MG/GRAM (0.5 %) OPHTHALMIC OINTMENT    Apply to both eyes every 6 (six) hours.   Previous Medications    ALPRAZOLAM (XANAX) 0.5 MG TABLET    Take 1 tablet (0.5 mg total) by mouth 3 (three) times a day as needed for anxiety.    ESCITALOPRAM (LEXAPRO) 20 MG TABLET    Take 1 tablet (20 mg total) by mouth once daily.    FENTANYL (DURAGESIC) 12 MCG/HR    Place 1 patch on the skin every 3 (three) days for 15 days.    FENTANYL (DURAGESIC) 25 MCG/HR    Place 1 patch on the skin See admin instr. Apply 1 patch topically every 72 hours    HYDROCHLOROTHIAZIDE (HYDRODIURIL) 25 MG TABLET    Take 1 tablet (25 mg total) by mouth once daily.    LANSOPRAZOLE (PREVACID) 30 MG CAPSULE    Take 1 capsule (30 mg total) by mouth daily before breakfast.    LIFITEGRAST (XIIDRA) 5 % DROPPERETTE DROPPERETTE    Administer 0.05 vials (1 drop total) into both eyes 2 (two) times a day.    METFORMIN (GLUCOPHAGE) 500 MG TABLET    Take 1 tablet (500 mg total) by mouth 2 (two) times a day with meals.    METOPROLOL SUCCINATE XL (TOPROL-XL) 100 MG 24 HR TABLET    Take 1 tablet (100 mg total) by mouth daily.    OLMESARTAN (BENICAR) 40 MG TABLET     Take 1 tablet (40 mg total) by mouth once daily.    OXYCODONE-ACETAMINOPHEN (PERCOCET) 5-325 MG PER TABLET    Take 2 tablets by mouth every 6 (six) hours as needed for moderate pain.   Modified Medications    No medications on file   Discontinued Medications    No medications on file     New Prescriptions    AMOXICILLIN-POT CLAVULANATE (AUGMENTIN) 875-125 MG PER TABLET    Take 1 tablet by mouth 2 (two) times a day for 10 days.    ERYTHROMYCIN (ILOTYCIN) 5 MG/GRAM (0.5 %) OPHTHALMIC OINTMENT    Apply to both eyes every 6 (six) hours.       Allergies   Allergen Reactions   • Hydrocodone Angioedema     In throat  Other reaction(s): Swelling  In throat  In throat  Other reaction(s): Anaphylaxis   • Vitamin B Complex Other (see comments)     hot   • Adhesive      Other reaction(s): Unknown       Social History     Socioeconomic History   • Marital status:      Spouse name: None   • Number of children: None   • Years of education: None   • Highest education level: None   Occupational History   • None   Social Needs   • Financial resource strain: None   • Food insecurity:     Worry: None     Inability: None   • Transportation needs:     Medical: None     Non-medical: None   Tobacco Use   • Smoking status: Current Every Day Smoker     Packs/day: 0.50     Years: 40.00     Pack years: 20.00     Types: Cigarettes   • Smokeless tobacco: Never Used   Substance and Sexual Activity   • Alcohol use: No   • Drug use: No   • Sexual activity: Yes     Partners: Male   Lifestyle   • Physical activity:     Days per week: None     Minutes per session: None   • Stress: None   Relationships   • Social connections:     Talks on phone: None     Gets together: None     Attends Taoist service: None     Active member of club or organization: None     Attends meetings of clubs or organizations: None     Relationship status: None   • Intimate partner violence:     Fear of current or ex partner: None     Emotionally abused: None      "Physically abused: None     Forced sexual activity: None   Other Topics Concern   • None   Social History Narrative   • None       Family History   Problem Relation Age of Onset   • Heart disease Biological Mother    • No Known Problems Biological Father        Past Medical History:   Diagnosis Date   • Abdominal pain    • Anxiety    • Back pain    • Hypertension    • MDD (major depressive disorder)        Objective     Physical Exam   Constitutional: She is oriented to person, place, and time.   Mild discomfort   HENT:   + maxillary tenderness  + PND  + Pharyngeal erythema    Bilateral TM bulging   Eyes: Pupils are equal, round, and reactive to light. EOM are normal.   Neck: Normal range of motion. Neck supple.   Cardiovascular: Normal rate, regular rhythm and normal heart sounds.   Abdominal: Soft.   Musculoskeletal: Normal range of motion.   Neurological: She is oriented to person, place, and time.   Skin: Skin is warm and dry.   Vitals reviewed.      Visit Vitals  /78 (BP Location: Left upper arm, Patient Position: Sitting)   Pulse (!) 59   Temp 36.7 °C (98 °F)   Resp 16   Ht 1.676 m (5' 6\")   Wt 87.5 kg (193 lb)   SpO2 94%   BMI 31.15 kg/m²       Assessment/Plan   Problem List Items Addressed This Visit        Nervous    Acute bacterial conjunctivitis of both eyes    Relevant Medications    amoxicillin-pot clavulanate (AUGMENTIN) 875-125 mg per tablet    erythromycin (ILOTYCIN) 5 mg/gram (0.5 %) ophthalmic ointment       Respiratory    Acute non-recurrent maxillary sinusitis - Primary     Augmentin 875mg BID  Flonase BID             "

## 2020-02-04 ENCOUNTER — TELEPHONE (OUTPATIENT)
Dept: FAMILY MEDICINE | Facility: CLINIC | Age: 55
End: 2020-02-04

## 2020-02-04 RX ORDER — SULFAMETHOXAZOLE AND TRIMETHOPRIM 800; 160 MG/1; MG/1
1 TABLET ORAL 2 TIMES DAILY
Qty: 14 TABLET | Refills: 0 | Status: SHIPPED | OUTPATIENT
Start: 2020-02-04 | End: 2020-02-11

## 2020-02-24 DIAGNOSIS — M51.369 DEGENERATION OF LUMBAR INTERVERTEBRAL DISC: ICD-10-CM

## 2020-02-24 RX ORDER — FENTANYL 12.5 UG/1
1 PATCH TRANSDERMAL
Qty: 5 PATCH | Refills: 0 | Status: SHIPPED | OUTPATIENT
Start: 2020-02-24 | End: 2020-03-20 | Stop reason: SDUPTHER

## 2020-02-24 RX ORDER — OXYCODONE AND ACETAMINOPHEN 5; 325 MG/1; MG/1
2 TABLET ORAL EVERY 6 HOURS PRN
Qty: 180 TABLET | Refills: 0 | Status: SHIPPED | OUTPATIENT
Start: 2020-02-24 | End: 2020-03-20 | Stop reason: SDUPTHER

## 2020-02-24 RX ORDER — FENTANYL 25 UG/1
1 PATCH TRANSDERMAL SEE ADMIN INSTRUCTIONS
Qty: 10 PATCH | Refills: 0 | Status: SHIPPED | OUTPATIENT
Start: 2020-02-24 | End: 2020-03-20 | Stop reason: SDUPTHER

## 2020-02-24 RX ORDER — ALPRAZOLAM 0.5 MG/1
0.5 TABLET ORAL 3 TIMES DAILY PRN
Qty: 90 TABLET | Refills: 0 | Status: SHIPPED | OUTPATIENT
Start: 2020-02-24 | End: 2020-03-25 | Stop reason: SDUPTHER

## 2020-02-24 NOTE — TELEPHONE ENCOUNTER
Refill:  Oxycodone-acetaminophen (Percocet) 5-325  Alprazolam (Xanax) 0.5mg   Fentanyl (Duragesic) 25mcg/hr patch  Fentanyl (Duragesic) 12mcg/hr patch

## 2020-03-11 RX ORDER — HYDROCHLOROTHIAZIDE 25 MG/1
TABLET ORAL
Qty: 90 TABLET | Refills: 1 | Status: SHIPPED | OUTPATIENT
Start: 2020-03-11 | End: 2020-11-25

## 2020-03-11 NOTE — TELEPHONE ENCOUNTER
Medicine Refill Request    Last Office Visit: 1/29/2020  Next Office Visit: Visit date not found        Current Outpatient Medications:   •  ALPRAZolam (XANAX) 0.5 mg tablet, Take 1 tablet (0.5 mg total) by mouth 3 (three) times a day as needed for anxiety., Disp: 90 tablet, Rfl: 0  •  escitalopram (LEXAPRO) 20 mg tablet, Take 1 tablet (20 mg total) by mouth once daily., Disp: 30 tablet, Rfl: 5  •  fentaNYL (DURAGESIC) 12 mcg/hr, Place 1 patch on the skin every 3 (three) days for 15 days., Disp: 5 patch, Rfl: 0  •  fentaNYL (DURAGESIC) 25 mcg/hr, Place 1 patch on the skin See admin instr. Apply 1 patch topically every 72 hours, Disp: 10 patch, Rfl: 0  •  hydrochlorothiazide (HYDRODIURIL) 25 mg tablet, Take 1 tablet (25 mg total) by mouth once daily., Disp: 30 tablet, Rfl: 2  •  lansoprazole (PREVACID) 30 mg capsule, Take 1 capsule (30 mg total) by mouth daily before breakfast., Disp: 30 capsule, Rfl: 2  •  lifitegrast (XIIDRA) 5 % dropperette dropperette, Administer 0.05 vials (1 drop total) into both eyes 2 (two) times a day., Disp: 60 vial, Rfl: 3  •  metFORMIN (GLUCOPHAGE) 500 mg tablet, Take 1 tablet (500 mg total) by mouth 2 (two) times a day with meals., Disp: 60 tablet, Rfl: 3  •  metoprolol succinate XL (TOPROL-XL) 100 mg 24 hr tablet, Take 1 tablet (100 mg total) by mouth daily., Disp: 90 tablet, Rfl: 1  •  olmesartan (BENICAR) 40 mg tablet, Take 1 tablet (40 mg total) by mouth once daily., Disp: 30 tablet, Rfl: 6  •  oxyCODONE-acetaminophen (PERCOCET) 5-325 mg per tablet, Take 2 tablets by mouth every 6 (six) hours as needed for moderate pain., Disp: 180 tablet, Rfl: 0      BP Readings from Last 3 Encounters:   01/29/20 126/78   12/30/19 140/74   08/20/19 120/74       Recent Lab results:  Lab Results   Component Value Date    CHOL 218 (H) 12/30/2019   ,   Lab Results   Component Value Date    HDL 44 12/30/2019   ,   Lab Results   Component Value Date    LDLCALC 108 (H) 12/30/2019   ,   Lab Results    Component Value Date    TRIG 332 (H) 12/30/2019        Lab Results   Component Value Date    GLUCOSE 138 (H) 12/30/2019   ,   Lab Results   Component Value Date    HGBA1C 6.9 (H) 12/30/2019         Lab Results   Component Value Date    CREATININE 0.57 12/30/2019       Lab Results   Component Value Date    TSH 4.890 (H) 12/30/2019

## 2020-03-18 ENCOUNTER — TELEPHONE (OUTPATIENT)
Dept: FAMILY MEDICINE | Facility: CLINIC | Age: 55
End: 2020-03-18

## 2020-03-18 RX ORDER — SULFAMETHOXAZOLE AND TRIMETHOPRIM 800; 160 MG/1; MG/1
1 TABLET ORAL 2 TIMES DAILY
Qty: 20 TABLET | Refills: 0 | Status: SHIPPED | OUTPATIENT
Start: 2020-03-18 | End: 2020-03-28

## 2020-03-20 DIAGNOSIS — M51.369 DEGENERATION OF LUMBAR INTERVERTEBRAL DISC: ICD-10-CM

## 2020-03-20 NOTE — TELEPHONE ENCOUNTER
Pt would like Rx Xanax 0.5 mg , Rx Oxycodone 5-325 mg , Rx Fentanyl 12 mcg and Rx Fenanyl 25 mcg called into [facility] . If any questions Pt can be reached at

## 2020-03-20 NOTE — TELEPHONE ENCOUNTER
Medicine Refill Request    Last Office Visit: 1/29/2020  Next Office Visit: Visit date not found        Current Outpatient Medications:   •  ALPRAZolam (XANAX) 0.5 mg tablet, Take 1 tablet (0.5 mg total) by mouth 3 (three) times a day as needed for anxiety., Disp: 90 tablet, Rfl: 0  •  escitalopram (LEXAPRO) 20 mg tablet, Take 1 tablet (20 mg total) by mouth once daily., Disp: 30 tablet, Rfl: 5  •  fentaNYL (DURAGESIC) 12 mcg/hr, Place 1 patch on the skin every 3 (three) days for 15 days., Disp: 5 patch, Rfl: 0  •  fentaNYL (DURAGESIC) 25 mcg/hr, Place 1 patch on the skin See admin instr. Apply 1 patch topically every 72 hours, Disp: 10 patch, Rfl: 0  •  hydrochlorothiazide (HYDRODIURIL) 25 mg tablet, take 1 tablet by mouth daily, Disp: 90 tablet, Rfl: 1  •  lansoprazole (PREVACID) 30 mg capsule, Take 1 capsule (30 mg total) by mouth daily before breakfast., Disp: 30 capsule, Rfl: 2  •  lifitegrast (XIIDRA) 5 % dropperette dropperette, Administer 0.05 vials (1 drop total) into both eyes 2 (two) times a day., Disp: 60 vial, Rfl: 3  •  metFORMIN (GLUCOPHAGE) 500 mg tablet, Take 1 tablet (500 mg total) by mouth 2 (two) times a day with meals., Disp: 60 tablet, Rfl: 3  •  metoprolol succinate XL (TOPROL-XL) 100 mg 24 hr tablet, Take 1 tablet (100 mg total) by mouth daily., Disp: 90 tablet, Rfl: 1  •  olmesartan (BENICAR) 40 mg tablet, Take 1 tablet (40 mg total) by mouth once daily., Disp: 30 tablet, Rfl: 6  •  oxyCODONE-acetaminophen (PERCOCET) 5-325 mg per tablet, Take 2 tablets by mouth every 6 (six) hours as needed for moderate pain., Disp: 180 tablet, Rfl: 0  •  sulfamethoxazole-trimethoprim (BACTRIM DS) 800-160 mg per tablet, Take 1 tablet by mouth 2 (two) times a day for 10 days., Disp: 20 tablet, Rfl: 0      BP Readings from Last 3 Encounters:   01/29/20 126/78   12/30/19 140/74   08/20/19 120/74       Recent Lab results:  Lab Results   Component Value Date    CHOL 218 (H) 12/30/2019   ,   Lab Results    Component Value Date    HDL 44 12/30/2019   ,   Lab Results   Component Value Date    LDLCALC 108 (H) 12/30/2019   ,   Lab Results   Component Value Date    TRIG 332 (H) 12/30/2019        Lab Results   Component Value Date    GLUCOSE 138 (H) 12/30/2019   ,   Lab Results   Component Value Date    HGBA1C 6.9 (H) 12/30/2019         Lab Results   Component Value Date    CREATININE 0.57 12/30/2019       Lab Results   Component Value Date    TSH 4.890 (H) 12/30/2019

## 2020-03-23 RX ORDER — FENTANYL 12.5 UG/1
1 PATCH TRANSDERMAL
Qty: 5 PATCH | Refills: 0 | Status: SHIPPED | OUTPATIENT
Start: 2020-03-23 | End: 2020-04-22 | Stop reason: SDUPTHER

## 2020-03-23 RX ORDER — OXYCODONE AND ACETAMINOPHEN 5; 325 MG/1; MG/1
2 TABLET ORAL EVERY 6 HOURS PRN
Qty: 180 TABLET | Refills: 0 | Status: SHIPPED | OUTPATIENT
Start: 2020-03-23 | End: 2020-04-22 | Stop reason: SDUPTHER

## 2020-03-23 RX ORDER — FENTANYL 25 UG/1
1 PATCH TRANSDERMAL SEE ADMIN INSTRUCTIONS
Qty: 10 PATCH | Refills: 0 | Status: SHIPPED | OUTPATIENT
Start: 2020-03-23 | End: 2020-04-22 | Stop reason: SDUPTHER

## 2020-03-25 RX ORDER — ALPRAZOLAM 0.5 MG/1
0.5 TABLET ORAL 3 TIMES DAILY PRN
Qty: 90 TABLET | Refills: 0 | Status: SHIPPED | OUTPATIENT
Start: 2020-03-25 | End: 2020-04-22 | Stop reason: SDUPTHER

## 2020-04-20 RX ORDER — METOPROLOL SUCCINATE 100 MG/1
TABLET, EXTENDED RELEASE ORAL
Qty: 90 TABLET | Refills: 1 | Status: SHIPPED | OUTPATIENT
Start: 2020-04-20 | End: 2020-06-17 | Stop reason: SDUPTHER

## 2020-04-20 RX ORDER — LANSOPRAZOLE 30 MG/1
CAPSULE, DELAYED RELEASE ORAL
Qty: 30 CAPSULE | Refills: 2 | Status: SHIPPED | OUTPATIENT
Start: 2020-04-20 | End: 2020-09-09

## 2020-04-20 NOTE — TELEPHONE ENCOUNTER
Medicine Refill Request    Last Office Visit: 1/29/2020  Next Office Visit: Visit date not found        Current Outpatient Medications:   •  ALPRAZolam (XANAX) 0.5 mg tablet, Take 1 tablet (0.5 mg total) by mouth 3 (three) times a day as needed for anxiety., Disp: 90 tablet, Rfl: 0  •  escitalopram (LEXAPRO) 20 mg tablet, Take 1 tablet (20 mg total) by mouth once daily., Disp: 30 tablet, Rfl: 5  •  fentaNYL (DURAGESIC) 12 mcg/hr, Place 1 patch on the skin every 3 (three) days for 15 days., Disp: 5 patch, Rfl: 0  •  fentaNYL (DURAGESIC) 25 mcg/hr, Place 1 patch on the skin See admin instr. Apply 1 patch topically every 72 hours, Disp: 10 patch, Rfl: 0  •  hydrochlorothiazide (HYDRODIURIL) 25 mg tablet, take 1 tablet by mouth daily, Disp: 90 tablet, Rfl: 1  •  lansoprazole (PREVACID) 30 mg capsule, Take 1 capsule (30 mg total) by mouth daily before breakfast., Disp: 30 capsule, Rfl: 2  •  lifitegrast (XIIDRA) 5 % dropperette dropperette, Administer 0.05 vials (1 drop total) into both eyes 2 (two) times a day., Disp: 60 vial, Rfl: 3  •  metFORMIN (GLUCOPHAGE) 500 mg tablet, Take 1 tablet (500 mg total) by mouth 2 (two) times a day with meals., Disp: 60 tablet, Rfl: 3  •  metoprolol succinate XL (TOPROL-XL) 100 mg 24 hr tablet, Take 1 tablet (100 mg total) by mouth daily., Disp: 90 tablet, Rfl: 1  •  olmesartan (BENICAR) 40 mg tablet, Take 1 tablet (40 mg total) by mouth once daily., Disp: 30 tablet, Rfl: 6  •  oxyCODONE-acetaminophen (PERCOCET) 5-325 mg per tablet, Take 2 tablets by mouth every 6 (six) hours as needed for moderate pain., Disp: 180 tablet, Rfl: 0      BP Readings from Last 3 Encounters:   01/29/20 126/78   12/30/19 140/74   08/20/19 120/74       Recent Lab results:  Lab Results   Component Value Date    CHOL 218 (H) 12/30/2019   ,   Lab Results   Component Value Date    HDL 44 12/30/2019   ,   Lab Results   Component Value Date    LDLCALC 108 (H) 12/30/2019   ,   Lab Results   Component Value Date     TRIG 332 (H) 12/30/2019        Lab Results   Component Value Date    GLUCOSE 138 (H) 12/30/2019   ,   Lab Results   Component Value Date    HGBA1C 6.9 (H) 12/30/2019         Lab Results   Component Value Date    CREATININE 0.57 12/30/2019       Lab Results   Component Value Date    TSH 4.890 (H) 12/30/2019

## 2020-04-21 ENCOUNTER — TELEPHONE (OUTPATIENT)
Dept: FAMILY MEDICINE | Facility: CLINIC | Age: 55
End: 2020-04-21

## 2020-04-22 ENCOUNTER — TELEMEDICINE (OUTPATIENT)
Dept: FAMILY MEDICINE | Facility: CLINIC | Age: 55
End: 2020-04-22
Payer: COMMERCIAL

## 2020-04-22 DIAGNOSIS — M51.369 DDD (DEGENERATIVE DISC DISEASE), LUMBAR: ICD-10-CM

## 2020-04-22 DIAGNOSIS — G89.4 CHRONIC PAIN SYNDROME: Primary | ICD-10-CM

## 2020-04-22 DIAGNOSIS — M51.369 DEGENERATION OF LUMBAR INTERVERTEBRAL DISC: ICD-10-CM

## 2020-04-22 PROCEDURE — 99212 OFFICE O/P EST SF 10 MIN: CPT | Mod: 95 | Performed by: INTERNAL MEDICINE

## 2020-04-22 RX ORDER — OXYCODONE AND ACETAMINOPHEN 5; 325 MG/1; MG/1
2 TABLET ORAL EVERY 6 HOURS PRN
Qty: 180 TABLET | Refills: 0 | Status: SHIPPED | OUTPATIENT
Start: 2020-04-22 | End: 2020-05-18 | Stop reason: SDUPTHER

## 2020-04-22 RX ORDER — ALPRAZOLAM 0.5 MG/1
0.5 TABLET ORAL 3 TIMES DAILY PRN
Qty: 90 TABLET | Refills: 0 | Status: SHIPPED | OUTPATIENT
Start: 2020-04-22 | End: 2020-05-18 | Stop reason: SDUPTHER

## 2020-04-22 RX ORDER — FENTANYL 12.5 UG/1
1 PATCH TRANSDERMAL
Qty: 10 PATCH | Refills: 0 | Status: SHIPPED | OUTPATIENT
Start: 2020-04-22 | End: 2020-05-18 | Stop reason: SDUPTHER

## 2020-04-22 RX ORDER — FENTANYL 25 UG/1
1 PATCH TRANSDERMAL SEE ADMIN INSTRUCTIONS
Qty: 10 PATCH | Refills: 0 | Status: SHIPPED | OUTPATIENT
Start: 2020-04-22 | End: 2020-05-18 | Stop reason: SDUPTHER

## 2020-04-22 NOTE — PROGRESS NOTES
Request for Consent:  You and I are about to have a telemedicine check-in or visit. This is allowed because you are already my patient, and you have requested it.  This telemedicine visit will be billed to your health insurance or you, if you are self-insured.  You understand you will be responsible for any copayments or coinsurances that apply to your telemedicine visit.  Before starting our telemedicine visit, I am required to get your consent for this virtual check-in or visit by telemedicine. Do you consent?      Patient Response to Request for Consent: Yes    The following have been reviewed and updated as appropriate in this visit:         Visit Documentation:  Patient is a 54-year-old woman who presents for telemedicine visit regarding medication management.  Patient has a longstanding history of chronic pain associated with severe spinal arthritis she has been on stable doses of medication for quite some time.  She is continuing to tolerate her combination treatment.  She is on oxycodone and Tylenol, fentanyl patch 37 mcg, and she also takes alprazolam for discomfort and for anxiety.  These are all reordered today.  The PDMP and her extensive chart were both reviewed and refilled as appropriate.  A dose reduction discussion will be better suited for an in person appointment.  Although patient has not been amenable to this in the past.    We also discussed the patient's continued crackling in her ear.  She has had recurrent ear infections over the past year it looks like at least.  It is appropriate to refer this patient to an ENT for evaluation, which will be done when we return to elective evaluations being scheduled.   patient should have a follow-up appointment in 3 months may have her medications refilled twice during that time        Time Spent in Medical Discussion During This Encounter:  12 minutes   Closure 2 Information: This tab is for additional flaps and grafts, including complex repair and grafts and complex repair and flaps. You can also specify a different location for the additional defect, if the location is the same you do not need to select a new one. We will insert the automated text for the repair you select below just as we do for solitary flaps and grafts. Please note that at this time if you select a location with a different insurance zone you will need to override the ICD10 and CPT if appropriate.

## 2020-05-18 DIAGNOSIS — M51.369 DEGENERATION OF LUMBAR INTERVERTEBRAL DISC: ICD-10-CM

## 2020-05-18 RX ORDER — OXYCODONE AND ACETAMINOPHEN 5; 325 MG/1; MG/1
2 TABLET ORAL EVERY 6 HOURS PRN
Qty: 180 TABLET | Refills: 0 | Status: SHIPPED | OUTPATIENT
Start: 2020-05-18 | End: 2020-06-17 | Stop reason: SDUPTHER

## 2020-05-18 RX ORDER — FENTANYL 12.5 UG/1
1 PATCH TRANSDERMAL
Qty: 10 PATCH | Refills: 0 | Status: SHIPPED | OUTPATIENT
Start: 2020-05-18 | End: 2020-06-17 | Stop reason: SDUPTHER

## 2020-05-18 RX ORDER — ALPRAZOLAM 0.5 MG/1
0.5 TABLET ORAL 3 TIMES DAILY PRN
Qty: 90 TABLET | Refills: 0 | Status: SHIPPED | OUTPATIENT
Start: 2020-05-18 | End: 2020-06-17 | Stop reason: SDUPTHER

## 2020-05-18 RX ORDER — FENTANYL 25 UG/1
1 PATCH TRANSDERMAL SEE ADMIN INSTRUCTIONS
Qty: 10 PATCH | Refills: 0 | Status: SHIPPED | OUTPATIENT
Start: 2020-05-18 | End: 2020-06-17 | Stop reason: SDUPTHER

## 2020-05-18 RX ORDER — FENTANYL 12.5 UG/1
1 PATCH TRANSDERMAL
Qty: 10 PATCH | Refills: 0 | Status: CANCELLED | OUTPATIENT
Start: 2020-05-18 | End: 2020-06-17

## 2020-05-18 NOTE — TELEPHONE ENCOUNTER
Refill fentanyl 12 Mg  Refill fentanyl 25mg  Refill oxycodone 5-325 mg  Refill xanan 0.5 mg    [facility]

## 2020-05-18 NOTE — TELEPHONE ENCOUNTER
Medicine Refill Request    Last Office Visit: 1/29/2020  Last Telemedicine Visit: 4/22/2020 Emily Sullivan MD    Next Office Visit: Visit date not found  Next Telemedicine Visit: Visit date not found   Reviewed chart and PDMP. Refilling meds for PCP      Current Outpatient Medications:   •  ALPRAZolam (XANAX) 0.5 mg tablet, Take 1 tablet (0.5 mg total) by mouth 3 (three) times a day as needed for anxiety., Disp: 90 tablet, Rfl: 0  •  escitalopram (LEXAPRO) 20 mg tablet, Take 1 tablet (20 mg total) by mouth once daily., Disp: 30 tablet, Rfl: 5  •  fentaNYL (DURAGESIC) 12 mcg/hr, Place 1 patch on the skin every 3 (three) days., Disp: 10 patch, Rfl: 0  •  fentaNYL (DURAGESIC) 25 mcg/hr, Place 1 patch on the skin See admin instr. Apply 1 patch topically every 72 hours, Disp: 10 patch, Rfl: 0  •  hydrochlorothiazide (HYDRODIURIL) 25 mg tablet, take 1 tablet by mouth daily, Disp: 90 tablet, Rfl: 1  •  lansoprazole (PREVACID) 30 mg capsule, take 1 capsule daily before breakfast, Disp: 30 capsule, Rfl: 2  •  lifitegrast (XIIDRA) 5 % dropperette dropperette, Administer 0.05 vials (1 drop total) into both eyes 2 (two) times a day., Disp: 60 vial, Rfl: 3  •  metFORMIN (GLUCOPHAGE) 500 mg tablet, Take 1 tablet (500 mg total) by mouth 2 (two) times a day with meals., Disp: 60 tablet, Rfl: 3  •  metoprolol succinate XL (TOPROL-XL) 100 mg 24 hr tablet, take 1 tablet by mouth once daily, Disp: 90 tablet, Rfl: 1  •  olmesartan (BENICAR) 40 mg tablet, Take 1 tablet (40 mg total) by mouth once daily., Disp: 30 tablet, Rfl: 6  •  oxyCODONE-acetaminophen (PERCOCET) 5-325 mg per tablet, Take 2 tablets by mouth every 6 (six) hours as needed for moderate pain., Disp: 180 tablet, Rfl: 0      BP Readings from Last 3 Encounters:   01/29/20 126/78   12/30/19 140/74   08/20/19 120/74       Recent Lab results:  Lab Results   Component Value Date    CHOL 218 (H) 12/30/2019   ,   Lab Results   Component Value Date    HDL 44 12/30/2019   ,    Lab Results   Component Value Date    LDLCALC 108 (H) 12/30/2019   ,   Lab Results   Component Value Date    TRIG 332 (H) 12/30/2019        Lab Results   Component Value Date    GLUCOSE 138 (H) 12/30/2019   ,   Lab Results   Component Value Date    HGBA1C 6.9 (H) 12/30/2019         Lab Results   Component Value Date    CREATININE 0.57 12/30/2019       Lab Results   Component Value Date    TSH 4.890 (H) 12/30/2019

## 2020-05-27 DIAGNOSIS — F41.9 ANXIETY: ICD-10-CM

## 2020-05-27 RX ORDER — ESCITALOPRAM OXALATE 20 MG/1
TABLET ORAL
Qty: 30 TABLET | Refills: 5 | Status: SHIPPED | OUTPATIENT
Start: 2020-05-27 | End: 2020-06-17 | Stop reason: SDUPTHER

## 2020-05-27 NOTE — TELEPHONE ENCOUNTER
Medicine Refill Request    Last Office Visit: 1/29/2020  Last Telemedicine Visit: 4/22/2020 Emily Sullivan MD    Next Office Visit: Visit date not found  Next Telemedicine Visit: Visit date not found         Current Outpatient Medications:   •  ALPRAZolam (XANAX) 0.5 mg tablet, Take 1 tablet (0.5 mg total) by mouth 3 (three) times a day as needed for anxiety., Disp: 90 tablet, Rfl: 0  •  escitalopram (LEXAPRO) 20 mg tablet, Take 1 tablet (20 mg total) by mouth once daily., Disp: 30 tablet, Rfl: 5  •  fentaNYL (DURAGESIC) 12 mcg/hr, Place 1 patch on the skin every 3 (three) days., Disp: 10 patch, Rfl: 0  •  fentaNYL (DURAGESIC) 25 mcg/hr, Place 1 patch on the skin See admin instr. Apply 1 patch topically every 72 hours, Disp: 10 patch, Rfl: 0  •  hydrochlorothiazide (HYDRODIURIL) 25 mg tablet, take 1 tablet by mouth daily, Disp: 90 tablet, Rfl: 1  •  lansoprazole (PREVACID) 30 mg capsule, take 1 capsule daily before breakfast, Disp: 30 capsule, Rfl: 2  •  lifitegrast (XIIDRA) 5 % dropperette dropperette, Administer 0.05 vials (1 drop total) into both eyes 2 (two) times a day., Disp: 60 vial, Rfl: 3  •  metFORMIN (GLUCOPHAGE) 500 mg tablet, Take 1 tablet (500 mg total) by mouth 2 (two) times a day with meals., Disp: 60 tablet, Rfl: 3  •  metoprolol succinate XL (TOPROL-XL) 100 mg 24 hr tablet, take 1 tablet by mouth once daily, Disp: 90 tablet, Rfl: 1  •  olmesartan (BENICAR) 40 mg tablet, Take 1 tablet (40 mg total) by mouth once daily., Disp: 30 tablet, Rfl: 6  •  oxyCODONE-acetaminophen (PERCOCET) 5-325 mg per tablet, Take 2 tablets by mouth every 6 (six) hours as needed for moderate pain., Disp: 180 tablet, Rfl: 0      BP Readings from Last 3 Encounters:   01/29/20 126/78   12/30/19 140/74   08/20/19 120/74       Recent Lab results:  Lab Results   Component Value Date    CHOL 218 (H) 12/30/2019   ,   Lab Results   Component Value Date    HDL 44 12/30/2019   ,   Lab Results   Component Value Date    LDLCALC  108 (H) 12/30/2019   ,   Lab Results   Component Value Date    TRIG 332 (H) 12/30/2019        Lab Results   Component Value Date    GLUCOSE 138 (H) 12/30/2019   ,   Lab Results   Component Value Date    HGBA1C 6.9 (H) 12/30/2019         Lab Results   Component Value Date    CREATININE 0.57 12/30/2019       Lab Results   Component Value Date    TSH 4.890 (H) 12/30/2019

## 2020-06-08 ENCOUNTER — TELEMEDICINE (OUTPATIENT)
Dept: FAMILY MEDICINE | Facility: CLINIC | Age: 55
End: 2020-06-08
Payer: COMMERCIAL

## 2020-06-08 DIAGNOSIS — K11.20 PAROTITIS: Primary | ICD-10-CM

## 2020-06-08 PROCEDURE — 99212 OFFICE O/P EST SF 10 MIN: CPT | Mod: 95 | Performed by: PHYSICIAN ASSISTANT

## 2020-06-08 NOTE — PROGRESS NOTES
Verification of Patient Location:  The patient affirms they are currently located in the following state:[place]     Request for Consent:  You and I are about to have a telemedicine check-in or visit. This is allowed because you are already my patient, and you have requested it.  This telemedicine visit will be billed to your health insurance or you, if you are self-insured.  You understand you will be responsible for any copayments or coinsurances that apply to your telemedicine visit.  Before starting our telemedicine visit, I am required to get your consent for this virtual check-in or visit by telemedicine. Do you consent?      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 Has had multiple previous infections, Saw ENT. At [facility] now.in [facility]  Will order Bactrim DS, ibuprofen. Call if no better        Time Spent in Medical Discussion During This Encounter:  10 minutes

## 2020-07-13 DIAGNOSIS — F41.9 ANXIETY: ICD-10-CM

## 2020-07-13 RX ORDER — ESCITALOPRAM OXALATE 20 MG/1
20 TABLET ORAL DAILY
Qty: 30 TABLET | Refills: 0 | Status: SHIPPED | OUTPATIENT
Start: 2020-07-13 | End: 2020-08-10

## 2020-07-13 NOTE — TELEPHONE ENCOUNTER
Medicine Refill Request    Last Office Visit: 1/29/2020  Last Telemedicine Visit: 6/8/2020 Liza Stein PA C    Next Office Visit: Visit date not found  Next Telemedicine Visit: Visit date not found         Current Outpatient Medications:   •  ALPRAZolam (XANAX) 0.5 mg tablet, Take 1 tablet (0.5 mg total) by mouth 3 (three) times a day as needed for anxiety., Disp: 90 tablet, Rfl: 0  •  escitalopram (LEXAPRO) 20 mg tablet, Take 1 tablet (20 mg total) by mouth once daily., Disp: 30 tablet, Rfl: 0  •  fentaNYL (DURAGESIC) 12 mcg/hr, Place 1 patch on the skin every 3 (three) days., Disp: 10 patch, Rfl: 0  •  fentaNYL (DURAGESIC) 25 mcg/hr, Place 1 patch on the skin See admin instr. Apply 1 patch topically every 72 hours, Disp: 10 patch, Rfl: 0  •  hydrochlorothiazide (HYDRODIURIL) 25 mg tablet, take 1 tablet by mouth daily, Disp: 90 tablet, Rfl: 1  •  lansoprazole (PREVACID) 30 mg capsule, take 1 capsule daily before breakfast, Disp: 30 capsule, Rfl: 2  •  lifitegrast (XIIDRA) 5 % dropperette dropperette, Administer 0.05 vials (1 drop total) into both eyes 2 (two) times a day., Disp: 60 vial, Rfl: 3  •  metFORMIN (GLUCOPHAGE) 500 mg tablet, Take 1 tablet (500 mg total) by mouth 2 (two) times a day with meals., Disp: 60 tablet, Rfl: 3  •  metoprolol succinate XL (TOPROL-XL) 100 mg 24 hr tablet, Take 1 tablet (100 mg total) by mouth once daily., Disp: 90 tablet, Rfl: 0  •  olmesartan (BENICAR) 40 mg tablet, Take 1 tablet (40 mg total) by mouth once daily., Disp: 30 tablet, Rfl: 6  •  oxyCODONE-acetaminophen (PERCOCET) 5-325 mg per tablet, Take 2 tablets by mouth every 6 (six) hours as needed for moderate pain., Disp: 180 tablet, Rfl: 0      BP Readings from Last 3 Encounters:   01/29/20 126/78   12/30/19 140/74   08/20/19 120/74       Recent Lab results:  Lab Results   Component Value Date    CHOL 218 (H) 12/30/2019   ,   Lab Results   Component Value Date    HDL 44 12/30/2019   ,   Lab Results   Component Value  Date    LDLCALC 108 (H) 12/30/2019   ,   Lab Results   Component Value Date    TRIG 332 (H) 12/30/2019        Lab Results   Component Value Date    GLUCOSE 138 (H) 12/30/2019   ,   Lab Results   Component Value Date    HGBA1C 6.9 (H) 12/30/2019         Lab Results   Component Value Date    CREATININE 0.57 12/30/2019       Lab Results   Component Value Date    TSH 4.890 (H) 12/30/2019

## 2020-07-14 ENCOUNTER — TELEPHONE (OUTPATIENT)
Dept: FAMILY MEDICINE | Facility: CLINIC | Age: 55
End: 2020-07-14

## 2020-07-14 DIAGNOSIS — M51.369 DEGENERATION OF LUMBAR INTERVERTEBRAL DISC: ICD-10-CM

## 2020-07-14 DIAGNOSIS — R52 PAIN: ICD-10-CM

## 2020-07-14 DIAGNOSIS — F41.9 ANXIETY: ICD-10-CM

## 2020-07-14 RX ORDER — METOPROLOL SUCCINATE 100 MG/1
100 TABLET, EXTENDED RELEASE ORAL DAILY
Qty: 90 TABLET | Refills: 0 | Status: SHIPPED | OUTPATIENT
Start: 2020-07-14 | End: 2020-10-15 | Stop reason: SDUPTHER

## 2020-07-14 RX ORDER — FENTANYL 25 UG/1
1 PATCH TRANSDERMAL SEE ADMIN INSTRUCTIONS
Qty: 10 PATCH | Refills: 0 | Status: SHIPPED | OUTPATIENT
Start: 2020-07-14 | End: 2020-08-14 | Stop reason: SDUPTHER

## 2020-07-14 RX ORDER — FENTANYL 12.5 UG/1
1 PATCH TRANSDERMAL
Qty: 10 PATCH | Refills: 0 | Status: CANCELLED | OUTPATIENT
Start: 2020-07-14 | End: 2020-08-13

## 2020-07-14 RX ORDER — FENTANYL 12.5 UG/1
1 PATCH TRANSDERMAL
Qty: 10 PATCH | Refills: 0 | Status: SHIPPED | OUTPATIENT
Start: 2020-07-14 | End: 2020-08-14 | Stop reason: SDUPTHER

## 2020-07-14 RX ORDER — OXYCODONE AND ACETAMINOPHEN 5; 325 MG/1; MG/1
2 TABLET ORAL EVERY 6 HOURS PRN
Qty: 180 TABLET | Refills: 0 | Status: SHIPPED | OUTPATIENT
Start: 2020-07-14 | End: 2020-08-14 | Stop reason: SDUPTHER

## 2020-07-14 RX ORDER — ALPRAZOLAM 0.5 MG/1
0.5 TABLET ORAL 3 TIMES DAILY PRN
Qty: 90 TABLET | Refills: 0 | Status: SHIPPED | OUTPATIENT
Start: 2020-07-14 | End: 2020-08-14 | Stop reason: SDUPTHER

## 2020-07-14 NOTE — TELEPHONE ENCOUNTER
Refill:  Oxycodone-acetaminophen 5-325             Fentanyl 25 mcg             Fentanyl 12 mcg             Alprazolam (Xanax) 0.5mg             Metoprolol Succinate XL 100mg

## 2020-07-14 NOTE — TELEPHONE ENCOUNTER
Medicine Refill Request    Last Office Visit: 1/29/2020  Last Telemedicine Visit: 6/8/2020 Liza Stein PA C    Next Office Visit: Visit date not found  Next Telemedicine Visit: Visit date not found   Chart and PDMP reviewed. Will refill at this time. Only our office prescribing      Current Outpatient Medications:   •  ALPRAZolam (XANAX) 0.5 mg tablet, Take 1 tablet (0.5 mg total) by mouth 3 (three) times a day as needed for anxiety., Disp: 90 tablet, Rfl: 0  •  escitalopram (LEXAPRO) 20 mg tablet, Take 1 tablet (20 mg total) by mouth daily., Disp: 30 tablet, Rfl: 0  •  fentaNYL (DURAGESIC) 12 mcg/hr, Place 1 patch on the skin every 3 (three) days., Disp: 10 patch, Rfl: 0  •  fentaNYL (DURAGESIC) 25 mcg/hr, Place 1 patch on the skin See admin instr. Apply 1 patch topically every 72 hours, Disp: 10 patch, Rfl: 0  •  hydrochlorothiazide (HYDRODIURIL) 25 mg tablet, take 1 tablet by mouth daily, Disp: 90 tablet, Rfl: 1  •  lansoprazole (PREVACID) 30 mg capsule, take 1 capsule daily before breakfast, Disp: 30 capsule, Rfl: 2  •  lifitegrast (XIIDRA) 5 % dropperette dropperette, Administer 0.05 vials (1 drop total) into both eyes 2 (two) times a day., Disp: 60 vial, Rfl: 3  •  metFORMIN (GLUCOPHAGE) 500 mg tablet, Take 1 tablet (500 mg total) by mouth 2 (two) times a day with meals., Disp: 60 tablet, Rfl: 3  •  metoprolol succinate XL (TOPROL-XL) 100 mg 24 hr tablet, Take 1 tablet (100 mg total) by mouth once daily., Disp: 90 tablet, Rfl: 0  •  olmesartan (BENICAR) 40 mg tablet, Take 1 tablet (40 mg total) by mouth once daily., Disp: 30 tablet, Rfl: 6  •  oxyCODONE-acetaminophen (PERCOCET) 5-325 mg per tablet, Take 2 tablets by mouth every 6 (six) hours as needed for moderate pain., Disp: 180 tablet, Rfl: 0      BP Readings from Last 3 Encounters:   01/29/20 126/78   12/30/19 140/74   08/20/19 120/74       Recent Lab results:  Lab Results   Component Value Date    CHOL 218 (H) 12/30/2019   ,   Lab Results    Component Value Date    HDL 44 12/30/2019   ,   Lab Results   Component Value Date    LDLCALC 108 (H) 12/30/2019   ,   Lab Results   Component Value Date    TRIG 332 (H) 12/30/2019        Lab Results   Component Value Date    GLUCOSE 138 (H) 12/30/2019   ,   Lab Results   Component Value Date    HGBA1C 6.9 (H) 12/30/2019         Lab Results   Component Value Date    CREATININE 0.57 12/30/2019       Lab Results   Component Value Date    TSH 4.890 (H) 12/30/2019

## 2020-07-22 ENCOUNTER — TELEPHONE (OUTPATIENT)
Dept: INFECTIOUS DISEASES | Facility: HOSPITAL | Age: 55
End: 2020-07-22

## 2020-07-22 ENCOUNTER — TELEMEDICINE (OUTPATIENT)
Dept: FAMILY MEDICINE | Facility: CLINIC | Age: 55
End: 2020-07-22
Payer: COMMERCIAL

## 2020-07-22 ENCOUNTER — CLINICAL SUPPORT (OUTPATIENT)
Dept: OTHER | Facility: CLINIC | Age: 55
End: 2020-07-22
Payer: COMMERCIAL

## 2020-07-22 DIAGNOSIS — R06.09 DYSPNEA ON EXERTION: Primary | ICD-10-CM

## 2020-07-22 DIAGNOSIS — R06.09 DYSPNEA ON EXERTION: ICD-10-CM

## 2020-07-22 DIAGNOSIS — Z20.822 SUSPECTED COVID-19 VIRUS INFECTION: ICD-10-CM

## 2020-07-22 PROCEDURE — 99213 OFFICE O/P EST LOW 20 MIN: CPT | Mod: 95 | Performed by: INTERNAL MEDICINE

## 2020-07-22 RX ORDER — ALBUTEROL SULFATE 90 UG/1
2 INHALANT RESPIRATORY (INHALATION) EVERY 6 HOURS PRN
Qty: 1 INHALER | Refills: 3 | Status: SHIPPED | OUTPATIENT
Start: 2020-07-22 | End: 2021-01-25 | Stop reason: ALTCHOICE

## 2020-07-22 NOTE — TELEPHONE ENCOUNTER
Please schedule this patient for Covid 19 testing.  I have updated the patient's demographic information with the patient's contact information for scheduling.    Patient having symptoms?: yes  If yes, list symptoms:Fever, Chills and Aches    This provider will be placing the order in Epic: yes  If no, this provider was directed to fax the order to [phone]: No    Ordering provider (First - Last): Dr Emily Sullivan  Provider Best Callback #: [phone]    This patient is a [employer] Employee: YES/NO/NA: No  If yes: Hospital/Facility & Unit/Department & Job Title:

## 2020-07-22 NOTE — PROGRESS NOTES
Patient was processed today at [facility]-19 drive-up clinic.  Pt denies any sort of medical distress today.  After identifying the patient, a nasopharyngeal sample was obtained and placed in viral transport medium.  Pt was given a post-collection education sheet and encouraged to self-isolate until they receive the results.  Pt directed to [facility] website for [facility] Privacy Practices.  Sample sent to the lab noted on the order and requisition.  Pt was without additional questions or concerns and was dispositioned from the testing area to home.

## 2020-07-23 PROBLEM — Z20.822 SUSPECTED COVID-19 VIRUS INFECTION: Status: ACTIVE | Noted: 2020-07-23

## 2020-07-23 RX ORDER — ONDANSETRON 4 MG/1
4 TABLET, ORALLY DISINTEGRATING ORAL EVERY 6 HOURS PRN
COMMUNITY
Start: 2020-07-02 | End: 2021-01-25 | Stop reason: ALTCHOICE

## 2020-07-23 RX ORDER — DOXYCYCLINE 100 MG/1
100 CAPSULE ORAL
COMMUNITY
Start: 2020-07-02 | End: 2021-01-25 | Stop reason: ALTCHOICE

## 2020-07-23 NOTE — PROGRESS NOTES
Verification of Patient Location:  The patient affirms they are currently located in the following state:[place]     Request for Consent:  You and I are about to have a telemedicine check-in or visit. This is allowed because you are already my patient, and you have requested it.  This telemedicine visit will be billed to your health insurance or you, if you are self-insured.  You understand you will be responsible for any copayments or coinsurances that apply to your telemedicine visit.  Before starting our telemedicine visit, I am required to get your consent for this virtual check-in or visit by telemedicine. Do you consent?      Patient Response to Request for Consent: Yes    The following have been reviewed and updated as appropriate in this visit:  Tobacco  Allergies  Meds  Problems  Med Hx  Surg Hx  Fam Hx         Visit Documentation:  Patient is a 54-year-old woman with a past medical history of diabetes and COPD who presents with concerns of fever general malaise and shortness of breath about 2 weeks ago patient was not feeling well had a fever for 4 days.  She was down the shoulder she went to [facility] was tested for COVID that was negative she had some vomiting she also described pulling a check of her skin.  She was treated for Lyme disease and had Lyme labs drawn.  Patient was started on doxycycline 100 mg twice daily.      Lab work was negative.  For the last several days the patient has been increasingly short of breath on minimal activity.  Which she describes as above her usual reaction to a cold, even though she smokes.  Patient reports she did quit smoking due to this but feels fatigued, has the chills, has all over body aches.    Patient will have order placed for a repeat COVID testing.  Also a refill on her albuterol inhaler        Time Spent in Medical Discussion During This Encounter:  18 minutes

## 2020-07-24 LAB — SARS-COV-2 RNA RESP QL NAA+PROBE: NOT DETECTED

## 2020-08-14 DIAGNOSIS — M51.369 DEGENERATION OF LUMBAR INTERVERTEBRAL DISC: ICD-10-CM

## 2020-08-14 DIAGNOSIS — F41.9 ANXIETY: ICD-10-CM

## 2020-08-14 DIAGNOSIS — R52 PAIN: ICD-10-CM

## 2020-08-14 RX ORDER — FENTANYL 12.5 UG/1
1 PATCH TRANSDERMAL
Qty: 10 PATCH | Refills: 0 | Status: SHIPPED | OUTPATIENT
Start: 2020-08-14 | End: 2020-09-16 | Stop reason: SDUPTHER

## 2020-08-14 RX ORDER — FENTANYL 25 UG/1
1 PATCH TRANSDERMAL SEE ADMIN INSTRUCTIONS
Qty: 10 PATCH | Refills: 0 | Status: SHIPPED | OUTPATIENT
Start: 2020-08-14 | End: 2020-09-16 | Stop reason: SDUPTHER

## 2020-08-14 RX ORDER — OXYCODONE AND ACETAMINOPHEN 5; 325 MG/1; MG/1
2 TABLET ORAL EVERY 6 HOURS PRN
Qty: 180 TABLET | Refills: 0 | Status: SHIPPED | OUTPATIENT
Start: 2020-08-14 | End: 2020-09-16 | Stop reason: SDUPTHER

## 2020-08-14 RX ORDER — ALPRAZOLAM 0.5 MG/1
0.5 TABLET ORAL 3 TIMES DAILY PRN
Qty: 90 TABLET | Refills: 0 | Status: SHIPPED | OUTPATIENT
Start: 2020-08-14 | End: 2020-09-03 | Stop reason: SDUPTHER

## 2020-08-14 NOTE — TELEPHONE ENCOUNTER
Medicine Refill Request    Last Office Visit: 1/29/2020  Last Telemedicine Visit: 7/22/2020 Emily Sullivan MD    Next Office Visit: Visit date not found  Next Telemedicine Visit: Visit date not found   Chart and PDMP reviewed. Will refill med for patient.       Current Outpatient Medications:   •  albuterol HFA (VENTOLIN HFA) 90 mcg/actuation inhaler, Inhale 2 puffs every 6 (six) hours as needed for wheezing., Disp: 1 Inhaler, Rfl: 3  •  ALPRAZolam (XANAX) 0.5 mg tablet, Take 1 tablet (0.5 mg total) by mouth 3 (three) times a day as needed for anxiety., Disp: 90 tablet, Rfl: 0  •  doxycycline (MONODOX) 100 mg capsule, Take 100 mg by mouth 2 (two) times a day., Disp: , Rfl:   •  escitalopram (LEXAPRO) 20 mg tablet, Take 1 tablet (20 mg total) by mouth daily., Disp: 30 tablet, Rfl: 1  •  fentaNYL (DURAGESIC) 12 mcg/hr, Place 1 patch on the skin every 3 (three) days. Use with 25 Mcg patch for total of 37 mcg., Disp: 10 patch, Rfl: 0  •  fentaNYL (DURAGESIC) 25 mcg/hr, Place 1 patch on the skin See admin instr. Apply 1 patch topically every 72 hours. Use with 12mcg, for 37 mcg total., Disp: 10 patch, Rfl: 0  •  hydrochlorothiazide (HYDRODIURIL) 25 mg tablet, take 1 tablet by mouth daily, Disp: 90 tablet, Rfl: 1  •  lansoprazole (PREVACID) 30 mg capsule, take 1 capsule daily before breakfast, Disp: 30 capsule, Rfl: 2  •  lifitegrast (XIIDRA) 5 % dropperette dropperette, Administer 0.05 vials (1 drop total) into both eyes 2 (two) times a day., Disp: 60 vial, Rfl: 3  •  metFORMIN (GLUCOPHAGE) 500 mg tablet, Take 1 tablet (500 mg total) by mouth 2 (two) times a day with meals., Disp: 60 tablet, Rfl: 3  •  metoprolol succinate XL (TOPROL-XL) 100 mg 24 hr tablet, Take 1 tablet (100 mg total) by mouth daily., Disp: 90 tablet, Rfl: 0  •  olmesartan (BENICAR) 40 mg tablet, Take 1 tablet (40 mg total) by mouth once daily., Disp: 30 tablet, Rfl: 6  •  ondansetron ODT (ZOFRAN-ODT) 4 mg disintegrating tablet, Take 4 mg by mouth  every 6 (six) hours as needed., Disp: , Rfl:       BP Readings from Last 3 Encounters:   01/29/20 126/78   12/30/19 140/74   08/20/19 120/74       Recent Lab results:  Lab Results   Component Value Date    CHOL 218 (H) 12/30/2019   ,   Lab Results   Component Value Date    HDL 44 12/30/2019   ,   Lab Results   Component Value Date    LDLCALC 108 (H) 12/30/2019   ,   Lab Results   Component Value Date    TRIG 332 (H) 12/30/2019        Lab Results   Component Value Date    GLUCOSE 138 (H) 12/30/2019   ,   Lab Results   Component Value Date    HGBA1C 6.9 (H) 12/30/2019         Lab Results   Component Value Date    CREATININE 0.57 12/30/2019       Lab Results   Component Value Date    TSH 4.890 (H) 12/30/2019

## 2020-08-27 ENCOUNTER — OFFICE VISIT (OUTPATIENT)
Dept: FAMILY MEDICINE | Facility: CLINIC | Age: 55
End: 2020-08-27
Payer: COMMERCIAL

## 2020-08-27 VITALS
HEIGHT: 66 IN | BODY MASS INDEX: 30.7 KG/M2 | HEART RATE: 83 BPM | OXYGEN SATURATION: 96 % | DIASTOLIC BLOOD PRESSURE: 80 MMHG | SYSTOLIC BLOOD PRESSURE: 140 MMHG | WEIGHT: 191 LBS | RESPIRATION RATE: 18 BRPM | TEMPERATURE: 97.3 F

## 2020-08-27 DIAGNOSIS — F41.9 ANXIETY: ICD-10-CM

## 2020-08-27 DIAGNOSIS — F33.1 MDD (MAJOR DEPRESSIVE DISORDER), RECURRENT EPISODE, MODERATE (CMS/HCC): Primary | ICD-10-CM

## 2020-08-27 DIAGNOSIS — E11.9 TYPE 2 DIABETES MELLITUS WITHOUT COMPLICATION, WITHOUT LONG-TERM CURRENT USE OF INSULIN (CMS/HCC): ICD-10-CM

## 2020-08-27 PROBLEM — J06.9 ACUTE URI: Status: RESOLVED | Noted: 2019-03-22 | Resolved: 2020-08-27

## 2020-08-27 PROBLEM — J40 SINOBRONCHITIS: Status: RESOLVED | Noted: 2019-01-29 | Resolved: 2020-08-27

## 2020-08-27 PROBLEM — J01.00 ACUTE NON-RECURRENT MAXILLARY SINUSITIS: Status: RESOLVED | Noted: 2020-01-29 | Resolved: 2020-08-27

## 2020-08-27 PROBLEM — Z13.9 SCREENING FOR CONDITION: Status: RESOLVED | Noted: 2019-01-29 | Resolved: 2020-08-27

## 2020-08-27 PROBLEM — J32.9 SINOBRONCHITIS: Status: RESOLVED | Noted: 2019-01-29 | Resolved: 2020-08-27

## 2020-08-27 PROBLEM — Z12.31 SCREENING MAMMOGRAM, ENCOUNTER FOR: Status: RESOLVED | Noted: 2019-04-22 | Resolved: 2020-08-27

## 2020-08-27 PROBLEM — Z13.29 ENCOUNTER FOR SCREENING FOR ENDOCRINE DISORDER: Status: RESOLVED | Noted: 2019-01-29 | Resolved: 2020-08-27

## 2020-08-27 PROBLEM — K11.20 PAROTITIS: Status: RESOLVED | Noted: 2018-09-10 | Resolved: 2020-08-27

## 2020-08-27 PROBLEM — F41.0 PANIC ATTACKS: Status: ACTIVE | Noted: 2020-08-27

## 2020-08-27 PROBLEM — H65.191 ACUTE MEE (MIDDLE EAR EFFUSION), RIGHT: Status: RESOLVED | Noted: 2019-09-03 | Resolved: 2020-08-27

## 2020-08-27 PROBLEM — H10.33 ACUTE BACTERIAL CONJUNCTIVITIS OF BOTH EYES: Status: RESOLVED | Noted: 2020-01-29 | Resolved: 2020-08-27

## 2020-08-27 PROBLEM — H10.33 ACUTE CONJUNCTIVITIS OF BOTH EYES: Status: RESOLVED | Noted: 2018-06-19 | Resolved: 2020-08-27

## 2020-08-27 PROBLEM — Z13.220 SCREENING FOR LIPOID DISORDERS: Status: RESOLVED | Noted: 2019-01-29 | Resolved: 2020-08-27

## 2020-08-27 PROCEDURE — 99214 OFFICE O/P EST MOD 30 MIN: CPT | Performed by: INTERNAL MEDICINE

## 2020-09-03 RX ORDER — ALPRAZOLAM 0.5 MG/1
1 TABLET ORAL 3 TIMES DAILY PRN
Qty: 90 TABLET | Refills: 3 | Status: SHIPPED | OUTPATIENT
Start: 2020-09-03 | End: 2020-10-15 | Stop reason: SDUPTHER

## 2020-09-03 ASSESSMENT — ENCOUNTER SYMPTOMS
ALLERGIC/IMMUNOLOGIC NEGATIVE: 1
ENDOCRINE NEGATIVE: 1
MUSCULOSKELETAL NEGATIVE: 1
CARDIOVASCULAR NEGATIVE: 1
HEMATOLOGIC/LYMPHATIC NEGATIVE: 1
DYSPHORIC MOOD: 1
RESPIRATORY NEGATIVE: 1
NERVOUS/ANXIOUS: 1
CONSTITUTIONAL NEGATIVE: 1
NEUROLOGICAL NEGATIVE: 1
GASTROINTESTINAL NEGATIVE: 1
EYES NEGATIVE: 1

## 2020-09-03 NOTE — ASSESSMENT & PLAN NOTE
Patient's last hemoglobin A1c in December was 6.9 on her present regimen.  Patient did have other labs done at [facility] on August 20.  Patient is in no emotional or physical state to have blood drawn today.  Labs will be done at next this evaluation.

## 2020-09-03 NOTE — ASSESSMENT & PLAN NOTE
Patient is here with complaints of increased anxiety over the past 2 weeks.  Patient states that she was wrongfully accused at work and was expected to have a performance improvement plan.  Patient reports that this is a pattern where she works, where they decide they want to get rid of someone and they falsely accused them.  She said that she has seen it happen before she just never thought she would be the victim.  Patient is visibly shaken she is crying.  She has been to to psych crisis units for evaluation.  She is here for clearance to enroll in an intensive outpatient program.  Also to have [place] paperwork completed.  At this point we will allow patient to increase her alprazolam to 1 mg 3 times a day as needed.

## 2020-09-03 NOTE — ASSESSMENT & PLAN NOTE
Patient is having a hard time coping with situations that have occurred at work.  She expressed that last week she had thought about ending it all.  She has since been to [facility] and 1 other psych crisis unit for evaluation.  She has entrance paperwork for an intensive outpatient treatment program.  Her [facility] paperwork is completed for her.  Patient has contracted for safety.  She will be with her  until the outpatient program starts which is a week from today.

## 2020-09-03 NOTE — PROGRESS NOTES
"  Subjective     Patient ID: Hilda Cornelius is a 55 y.o. female.    HPI patient is physically and emotionally distraught.  Patient has FMLA and disability paperwork.  She is trying to get into a crisis program.    Review of Systems   Constitutional: Negative.    HENT: Negative.    Eyes: Negative.    Respiratory: Negative.    Cardiovascular: Negative.    Gastrointestinal: Negative.    Endocrine: Negative.    Genitourinary: Negative.    Musculoskeletal: Negative.    Skin: Negative.    Allergic/Immunologic: Negative.    Neurological: Negative.    Hematological: Negative.    Psychiatric/Behavioral: Positive for dysphoric mood and suicidal ideas. The patient is nervous/anxious.         Patient is sobbing, hyperventilating, emotionally distraught.  Previous mention of suicidal thoughts.  Here with  now and safe       Objective     Vitals:    08/27/20 1727   BP: 140/80   BP Location: Left upper arm   Patient Position: Sitting   Pulse: 83   Resp: 18   Temp: 36.3 °C (97.3 °F)   TempSrc: Skin   SpO2: 96%   Weight: 86.6 kg (191 lb)   Height: 1.676 m (5' 6\")     Body mass index is 30.83 kg/m².    Physical Exam   Constitutional: She is oriented to person, place, and time. She appears well-developed and well-nourished.   HENT:   Head: Normocephalic and atraumatic.   Right Ear: External ear normal.   Left Ear: External ear normal.   Nose: Nose normal.   Mouth/Throat: Oropharynx is clear and moist.   Eyes: Pupils are equal, round, and reactive to light. Conjunctivae and EOM are normal.   Neck: Normal range of motion. Neck supple.   Cardiovascular: Normal rate, regular rhythm, normal heart sounds and intact distal pulses. Exam reveals no gallop.   No murmur heard.  Pulmonary/Chest: Effort normal and breath sounds normal.   Abdominal: Soft. Bowel sounds are normal.   Musculoskeletal: Normal range of motion. She exhibits no edema.   Neurological: She is alert and oriented to person, place, and time. No cranial nerve deficit. "   Skin: Skin is warm and dry. Capillary refill takes less than 2 seconds.   Psychiatric: She has a normal mood and affect. Her behavior is normal. Judgment and thought content normal.   Nursing note and vitals reviewed.      Assessment/Plan   Diagnoses and all orders for this visit:    MDD (major depressive disorder), recurrent episode, moderate (CMS/[facility]) (Primary)  Assessment & Plan:  Patient is having a hard time coping with situations that have occurred at work.  She expressed that last week she had thought about ending it all.  She has since been to [facility] and 1 other psych crisis unit for evaluation.  She has entrance paperwork for an intensive outpatient treatment program.  Her [facility] paperwork is completed for her.  Patient has contracted for safety.  She will be with her  until the outpatient program starts which is a week from today.      Anxiety  Assessment & Plan:  Patient is here with complaints of increased anxiety over the past 2 weeks.  Patient states that she was wrongfully accused at work and was expected to have a performance improvement plan.  Patient reports that this is a pattern where she works, where they decide they want to get rid of someone and they falsely accused them.  She said that she has seen it happen before she just never thought she would be the victim.  Patient is visibly shaken she is crying.  She has been to to psych crisis units for evaluation.  She is here for clearance to enroll in an intensive outpatient program.  Also to have [facility] paperwork completed.  At this point we will allow patient to increase her alprazolam to 1 mg 3 times a day as needed.    Orders:  -     ALPRAZolam (XANAX) 0.5 mg tablet; Take 2 tablets (1 mg total) by mouth 3 (three) times a day as needed for anxiety for up to 15 days.    Type 2 diabetes mellitus without complication, without long-term current use of insulin (CMS/[facility])  Assessment & Plan:  Patient's last hemoglobin A1c in December was 6.9  on her present regimen.  Patient did have other labs done at [facility] on August 20.  Patient is in no emotional or physical state to have blood drawn today.  Labs will be done at next this evaluation.

## 2020-09-09 RX ORDER — OLMESARTAN MEDOXOMIL 40 MG/1
TABLET ORAL
Qty: 30 TABLET | Refills: 6 | Status: SHIPPED | OUTPATIENT
Start: 2020-09-09 | End: 2021-09-29 | Stop reason: SDUPTHER

## 2020-09-09 RX ORDER — LANSOPRAZOLE 30 MG/1
CAPSULE, DELAYED RELEASE ORAL
Qty: 30 CAPSULE | Refills: 2 | Status: SHIPPED | OUTPATIENT
Start: 2020-09-09 | End: 2021-01-11

## 2020-09-09 NOTE — TELEPHONE ENCOUNTER
Medicine Refill Request    Last Office Visit: 8/27/2020  Last Telemedicine Visit: 7/22/2020 Emily Sullivan MD    Next Office Visit: Visit date not found  Next Telemedicine Visit: Visit date not found         Current Outpatient Medications:   •  albuterol HFA (VENTOLIN HFA) 90 mcg/actuation inhaler, Inhale 2 puffs every 6 (six) hours as needed for wheezing., Disp: 1 Inhaler, Rfl: 3  •  ALPRAZolam (XANAX) 0.5 mg tablet, Take 2 tablets (1 mg total) by mouth 3 (three) times a day as needed for anxiety for up to 15 days., Disp: 90 tablet, Rfl: 3  •  doxycycline (MONODOX) 100 mg capsule, Take 100 mg by mouth 2 (two) times a day., Disp: , Rfl:   •  escitalopram (LEXAPRO) 20 mg tablet, Take 1 tablet (20 mg total) by mouth daily., Disp: 30 tablet, Rfl: 1  •  fentaNYL (DURAGESIC) 12 mcg/hr, Place 1 patch on the skin every 3 (three) days. Use with 25 Mcg patch for total of 37 mcg., Disp: 10 patch, Rfl: 0  •  fentaNYL (DURAGESIC) 25 mcg/hr, Place 1 patch on the skin See admin instr. Apply 1 patch topically every 72 hours. Use with 12mcg, for 37 mcg total., Disp: 10 patch, Rfl: 0  •  hydrochlorothiazide (HYDRODIURIL) 25 mg tablet, take 1 tablet by mouth daily, Disp: 90 tablet, Rfl: 1  •  lansoprazole (PREVACID) 30 mg capsule, take 1 capsule daily before breakfast, Disp: 30 capsule, Rfl: 2  •  lifitegrast (XIIDRA) 5 % dropperette dropperette, Administer 0.05 vials (1 drop total) into both eyes 2 (two) times a day. (Patient not taking: Reported on 8/27/2020 ), Disp: 60 vial, Rfl: 3  •  metFORMIN (GLUCOPHAGE) 500 mg tablet, Take 1 tablet (500 mg total) by mouth 2 (two) times a day with meals. (Patient not taking: Reported on 8/27/2020 ), Disp: 60 tablet, Rfl: 3  •  metoprolol succinate XL (TOPROL-XL) 100 mg 24 hr tablet, Take 1 tablet (100 mg total) by mouth daily., Disp: 90 tablet, Rfl: 0  •  olmesartan (BENICAR) 40 mg tablet, Take 1 tablet (40 mg total) by mouth once daily., Disp: 30 tablet, Rfl: 6  •  ondansetron ODT  (ZOFRAN-ODT) 4 mg disintegrating tablet, Take 4 mg by mouth every 6 (six) hours as needed., Disp: , Rfl:   •  oxyCODONE-acetaminophen (PERCOCET) 5-325 mg per tablet, Take 2 tablets by mouth every 6 (six) hours as needed for moderate pain., Disp: 180 tablet, Rfl: 0      BP Readings from Last 3 Encounters:   08/27/20 140/80   01/29/20 126/78   12/30/19 140/74       Recent Lab results:  Lab Results   Component Value Date    CHOL 218 (H) 12/30/2019   ,   Lab Results   Component Value Date    HDL 44 12/30/2019   ,   Lab Results   Component Value Date    LDLCALC 108 (H) 12/30/2019   ,   Lab Results   Component Value Date    TRIG 332 (H) 12/30/2019        Lab Results   Component Value Date    GLUCOSE 138 (H) 12/30/2019   ,   Lab Results   Component Value Date    HGBA1C 6.9 (H) 12/30/2019         Lab Results   Component Value Date    CREATININE 0.57 12/30/2019       Lab Results   Component Value Date    TSH 4.890 (H) 12/30/2019

## 2020-09-16 DIAGNOSIS — M51.369 DEGENERATION OF LUMBAR INTERVERTEBRAL DISC: ICD-10-CM

## 2020-09-16 DIAGNOSIS — R52 PAIN: ICD-10-CM

## 2020-09-16 RX ORDER — FENTANYL 12.5 UG/1
1 PATCH TRANSDERMAL
Qty: 10 PATCH | Refills: 0 | Status: SHIPPED | OUTPATIENT
Start: 2020-09-16 | End: 2020-10-15 | Stop reason: SDUPTHER

## 2020-09-16 RX ORDER — OXYCODONE AND ACETAMINOPHEN 5; 325 MG/1; MG/1
2 TABLET ORAL EVERY 6 HOURS PRN
Qty: 180 TABLET | Refills: 0 | Status: SHIPPED | OUTPATIENT
Start: 2020-09-16 | End: 2020-10-15 | Stop reason: SDUPTHER

## 2020-09-16 RX ORDER — FENTANYL 25 UG/1
1 PATCH TRANSDERMAL SEE ADMIN INSTRUCTIONS
Qty: 10 PATCH | Refills: 0 | Status: SHIPPED | OUTPATIENT
Start: 2020-09-16 | End: 2020-10-15 | Stop reason: SDUPTHER

## 2020-09-16 NOTE — TELEPHONE ENCOUNTER
Medicine Refill Request    Last Office Visit: 8/27/2020  Last Telemedicine Visit: 7/22/2020 Emily Sullivan MD    Next Office Visit: Visit date not found  Next Telemedicine Visit: Visit date not found     Last refill 08/14/2020    Current Outpatient Medications:   •  albuterol HFA (VENTOLIN HFA) 90 mcg/actuation inhaler, Inhale 2 puffs every 6 (six) hours as needed for wheezing., Disp: 1 Inhaler, Rfl: 3  •  ALPRAZolam (XANAX) 0.5 mg tablet, Take 2 tablets (1 mg total) by mouth 3 (three) times a day as needed for anxiety for up to 15 days., Disp: 90 tablet, Rfl: 3  •  doxycycline (MONODOX) 100 mg capsule, Take 100 mg by mouth 2 (two) times a day., Disp: , Rfl:   •  escitalopram (LEXAPRO) 20 mg tablet, Take 1 tablet (20 mg total) by mouth daily., Disp: 30 tablet, Rfl: 1  •  fentaNYL (DURAGESIC) 12 mcg/hr, Place 1 patch on the skin every 3 (three) days. Use with 25 Mcg patch for total of 37 mcg., Disp: 10 patch, Rfl: 0  •  fentaNYL (DURAGESIC) 25 mcg/hr, Place 1 patch on the skin See admin instr. Apply 1 patch topically every 72 hours. Use with 12mcg, for 37 mcg total., Disp: 10 patch, Rfl: 0  •  hydrochlorothiazide (HYDRODIURIL) 25 mg tablet, take 1 tablet by mouth daily, Disp: 90 tablet, Rfl: 1  •  lansoprazole (PREVACID) 30 mg capsule, take orally 1 capsule daily before breakfast, Disp: 30 capsule, Rfl: 2  •  lifitegrast (XIIDRA) 5 % dropperette dropperette, Administer 0.05 vials (1 drop total) into both eyes 2 (two) times a day. (Patient not taking: Reported on 8/27/2020 ), Disp: 60 vial, Rfl: 3  •  metFORMIN (GLUCOPHAGE) 500 mg tablet, Take 1 tablet (500 mg total) by mouth 2 (two) times a day with meals. (Patient not taking: Reported on 8/27/2020 ), Disp: 60 tablet, Rfl: 3  •  metoprolol succinate XL (TOPROL-XL) 100 mg 24 hr tablet, Take 1 tablet (100 mg total) by mouth daily., Disp: 90 tablet, Rfl: 0  •  olmesartan (BENICAR) 40 mg tablet, take 1 tablet by mouth daily, Disp: 30 tablet, Rfl: 6  •  ondansetron  ODT (ZOFRAN-ODT) 4 mg disintegrating tablet, Take 4 mg by mouth every 6 (six) hours as needed., Disp: , Rfl:       BP Readings from Last 3 Encounters:   08/27/20 140/80   01/29/20 126/78   12/30/19 140/74       Recent Lab results:  Lab Results   Component Value Date    CHOL 218 (H) 12/30/2019   ,   Lab Results   Component Value Date    HDL 44 12/30/2019   ,   Lab Results   Component Value Date    LDLCALC 108 (H) 12/30/2019   ,   Lab Results   Component Value Date    TRIG 332 (H) 12/30/2019        Lab Results   Component Value Date    GLUCOSE 138 (H) 12/30/2019   ,   Lab Results   Component Value Date    HGBA1C 6.9 (H) 12/30/2019         Lab Results   Component Value Date    CREATININE 0.57 12/30/2019       Lab Results   Component Value Date    TSH 4.890 (H) 12/30/2019

## 2020-09-24 ENCOUNTER — DOCUMENTATION (OUTPATIENT)
Dept: FAMILY MEDICINE | Facility: CLINIC | Age: 55
End: 2020-09-24

## 2020-09-24 RX ORDER — OLANZAPINE 2.5 MG/1
2.5 TABLET ORAL NIGHTLY
COMMUNITY
Start: 2020-09-04 | End: 2021-01-25 | Stop reason: ALTCHOICE

## 2020-09-24 NOTE — PROGRESS NOTES
Patient released from [facility]. Olanzapine 2.5 mg at HS was added to her medications.

## 2020-10-01 ENCOUNTER — TELEPHONE (OUTPATIENT)
Dept: FAMILY MEDICINE | Facility: CLINIC | Age: 55
End: 2020-10-01

## 2020-10-01 RX ORDER — DEXTROSE 4 G
1 TABLET,CHEWABLE ORAL 2 TIMES DAILY
Qty: 1 EACH | Refills: 0 | Status: SHIPPED | OUTPATIENT
Start: 2020-10-01

## 2020-10-01 RX ORDER — LANCETS 28 GAUGE
1 EACH MISCELLANEOUS 2 TIMES DAILY
Qty: 100 EACH | Refills: 3 | Status: SHIPPED | OUTPATIENT
Start: 2020-10-01

## 2020-10-01 RX ORDER — METFORMIN HYDROCHLORIDE 500 MG/1
1000 TABLET ORAL 2 TIMES DAILY WITH MEALS
Qty: 360 TABLET | Refills: 1 | Status: SHIPPED | OUTPATIENT
Start: 2020-10-01 | End: 2021-04-19

## 2020-10-01 RX ORDER — BLOOD-GLUCOSE METER
1 KIT MISCELLANEOUS 2 TIMES DAILY
Qty: 100 STRIP | Refills: 3 | Status: SHIPPED | OUTPATIENT
Start: 2020-10-01 | End: 2020-10-31

## 2020-10-01 RX ORDER — GLIPIZIDE 10 MG/1
10 TABLET ORAL 2 TIMES DAILY WITH MEALS
Qty: 180 TABLET | Refills: 1 | Status: SHIPPED | OUTPATIENT
Start: 2020-10-01 | End: 2021-01-25 | Stop reason: ALTCHOICE

## 2020-10-01 NOTE — TELEPHONE ENCOUNTER
Spoke with patient. Sugars are elevated due to olanzepine. Med started due to her depression. Has a history of diet controlled diabetes. Diabetes monitor, lancets, strips and refill on metformin. With addition of

## 2020-10-01 NOTE — TELEPHONE ENCOUNTER
Patient seen in [facility] yesterday. Sugars were 570. Was given shot of insulin, got down to 240.    This morning elevated to over 350. Has not eaten yet today.    Took 2 metformin 500 MG, did not help    [facility]

## 2020-10-15 DIAGNOSIS — F41.9 ANXIETY: ICD-10-CM

## 2020-10-15 DIAGNOSIS — R52 PAIN: ICD-10-CM

## 2020-10-15 DIAGNOSIS — M51.369 DEGENERATION OF LUMBAR INTERVERTEBRAL DISC: ICD-10-CM

## 2020-10-15 RX ORDER — METOPROLOL SUCCINATE 100 MG/1
100 TABLET, EXTENDED RELEASE ORAL DAILY
Qty: 90 TABLET | Refills: 1 | Status: SHIPPED | OUTPATIENT
Start: 2020-10-15 | End: 2020-12-16 | Stop reason: SDUPTHER

## 2020-10-15 RX ORDER — ALPRAZOLAM 0.5 MG/1
1 TABLET ORAL 3 TIMES DAILY PRN
Qty: 90 TABLET | Refills: 0 | Status: SHIPPED | OUTPATIENT
Start: 2020-10-15 | End: 2020-11-16 | Stop reason: SDUPTHER

## 2020-10-15 RX ORDER — OXYCODONE AND ACETAMINOPHEN 5; 325 MG/1; MG/1
2 TABLET ORAL EVERY 6 HOURS PRN
Qty: 180 TABLET | Refills: 0 | Status: SHIPPED | OUTPATIENT
Start: 2020-10-15 | End: 2020-11-14

## 2020-10-15 RX ORDER — FENTANYL 12.5 UG/1
1 PATCH TRANSDERMAL
Qty: 10 PATCH | Refills: 0 | Status: SHIPPED | OUTPATIENT
Start: 2020-10-15 | End: 2020-11-16 | Stop reason: SDUPTHER

## 2020-10-15 RX ORDER — FENTANYL 25 UG/1
1 PATCH TRANSDERMAL SEE ADMIN INSTRUCTIONS
Qty: 10 PATCH | Refills: 0 | Status: SHIPPED | OUTPATIENT
Start: 2020-10-15 | End: 2020-11-16 | Stop reason: SDUPTHER

## 2020-10-15 NOTE — TELEPHONE ENCOUNTER
Medicine Refill Request    Last Office Visit: 8/27/2020  Last Telemedicine Visit: 7/22/2020 Emily Sullivan MD    Next Office Visit: Visit date not found  Next Telemedicine Visit: Visit date not found         Current Outpatient Medications:   •  albuterol HFA (VENTOLIN HFA) 90 mcg/actuation inhaler, Inhale 2 puffs every 6 (six) hours as needed for wheezing., Disp: 1 Inhaler, Rfl: 3  •  ALPRAZolam (XANAX) 0.5 mg tablet, Take 2 tablets (1 mg total) by mouth 3 (three) times a day as needed for anxiety for up to 15 days., Disp: 90 tablet, Rfl: 3  •  blood-glucose meter (PRECISION XTRA MONITOR) misc, 1 strip 2 (two) times a day., Disp: 1 each, Rfl: 0  •  doxycycline (MONODOX) 100 mg capsule, Take 100 mg by mouth 2 (two) times a day., Disp: , Rfl:   •  escitalopram (LEXAPRO) 20 mg tablet, Take 1 tablet (20 mg total) by mouth daily., Disp: 30 tablet, Rfl: 1  •  fentaNYL (DURAGESIC) 12 mcg/hr, Place 1 patch on the skin every 3 (three) days. Use with 25 Mcg patch for total of 37 mcg., Disp: 10 patch, Rfl: 0  •  fentaNYL (DURAGESIC) 25 mcg/hr, Place 1 patch on the skin See admin instr. Apply 1 patch topically every 72 hours. Use with 12mcg, for 37 mcg total., Disp: 10 patch, Rfl: 0  •  FREESTYLE LITE STRIPS strip, 1 strip 2 (two) times a day., Disp: 100 strip, Rfl: 3  •  glipiZIDE (GLUCOTROL) 10 mg tablet, Take 1 tablet (10 mg total) by mouth 2 (two) times a day with meals., Disp: 180 tablet, Rfl: 1  •  hydrochlorothiazide (HYDRODIURIL) 25 mg tablet, take 1 tablet by mouth daily, Disp: 90 tablet, Rfl: 1  •  lancets (freestyle) 28 gauge misc, 1 strip 2 (two) times a day., Disp: 100 each, Rfl: 3  •  lansoprazole (PREVACID) 30 mg capsule, take orally 1 capsule daily before breakfast, Disp: 30 capsule, Rfl: 2  •  lifitegrast (XIIDRA) 5 % dropperette dropperette, Administer 0.05 vials (1 drop total) into both eyes 2 (two) times a day. (Patient not taking: Reported on 8/27/2020 ), Disp: 60 vial, Rfl: 3  •  metFORMIN  (GLUCOPHAGE) 500 mg tablet, Take 2 tablets (1,000 mg total) by mouth 2 (two) times a day with meals., Disp: 360 tablet, Rfl: 1  •  metoprolol succinate XL (TOPROL-XL) 100 mg 24 hr tablet, Take 1 tablet (100 mg total) by mouth daily., Disp: 90 tablet, Rfl: 0  •  OLANZapine (ZYPREXA) 2.5 mg tablet, Take 2.5 mg by mouth nightly., Disp: , Rfl:   •  olmesartan (BENICAR) 40 mg tablet, take 1 tablet by mouth daily, Disp: 30 tablet, Rfl: 6  •  ondansetron ODT (ZOFRAN-ODT) 4 mg disintegrating tablet, Take 4 mg by mouth every 6 (six) hours as needed., Disp: , Rfl:   •  oxyCODONE-acetaminophen (PERCOCET) 5-325 mg per tablet, Take 2 tablets by mouth every 6 (six) hours as needed for moderate pain., Disp: 180 tablet, Rfl: 0      BP Readings from Last 3 Encounters:   08/27/20 140/80   01/29/20 126/78   12/30/19 140/74       Recent Lab results:  Lab Results   Component Value Date    CHOL 218 (H) 12/30/2019   ,   Lab Results   Component Value Date    HDL 44 12/30/2019   ,   Lab Results   Component Value Date    LDLCALC 108 (H) 12/30/2019   ,   Lab Results   Component Value Date    TRIG 332 (H) 12/30/2019        Lab Results   Component Value Date    GLUCOSE 138 (H) 12/30/2019   ,   Lab Results   Component Value Date    HGBA1C 6.9 (H) 12/30/2019         Lab Results   Component Value Date    CREATININE 0.57 12/30/2019       Lab Results   Component Value Date    TSH 4.890 (H) 12/30/2019

## 2020-10-15 NOTE — TELEPHONE ENCOUNTER
Refill:  Fentanyl 12 mcg             Fentanyl  25mcg            Oxycodone-acetaminophen 5-325            Alprazolam 0.5mg            Metoprolol Succinate XL 100mg

## 2020-11-09 DIAGNOSIS — Z12.31 SCREENING MAMMOGRAM, ENCOUNTER FOR: Primary | ICD-10-CM

## 2020-11-16 ENCOUNTER — TELEPHONE (OUTPATIENT)
Dept: FAMILY MEDICINE | Facility: CLINIC | Age: 55
End: 2020-11-16

## 2020-11-16 RX ORDER — SULFAMETHOXAZOLE AND TRIMETHOPRIM 800; 160 MG/1; MG/1
1 TABLET ORAL 2 TIMES DAILY
Qty: 6 TABLET | Refills: 0 | Status: SHIPPED | OUTPATIENT
Start: 2020-11-16 | End: 2020-11-19

## 2020-11-16 NOTE — TELEPHONE ENCOUNTER
Patient is having a UTI and would like to know whether you can send in a prescription for her. Patient can be reached at [phone].

## 2020-11-25 RX ORDER — HYDROCHLOROTHIAZIDE 25 MG/1
25 TABLET ORAL
Qty: 90 TABLET | Refills: 1 | Status: SHIPPED | OUTPATIENT
Start: 2020-11-25 | End: 2021-09-29 | Stop reason: SDUPTHER

## 2020-11-25 NOTE — TELEPHONE ENCOUNTER
Medicine Refill Request    Last Office Visit: 8/27/2020  Last Telemedicine Visit: 7/22/2020 Emily Sullivan MD    Next Office Visit: Visit date not found  Next Telemedicine Visit: Visit date not found         Current Outpatient Medications:   •  albuterol HFA (VENTOLIN HFA) 90 mcg/actuation inhaler, Inhale 2 puffs every 6 (six) hours as needed for wheezing., Disp: 1 Inhaler, Rfl: 3  •  ALPRAZolam (XANAX) 0.5 mg tablet, Take 2 tablets (1 mg total) by mouth 3 (three) times a day as needed for anxiety for up to 15 days., Disp: 90 tablet, Rfl: 0  •  blood-glucose meter (PRECISION XTRA MONITOR) misc, 1 strip 2 (two) times a day., Disp: 1 each, Rfl: 0  •  doxycycline (MONODOX) 100 mg capsule, Take 100 mg by mouth 2 (two) times a day., Disp: , Rfl:   •  escitalopram (LEXAPRO) 20 mg tablet, Take 1 tablet (20 mg total) by mouth daily., Disp: 30 tablet, Rfl: 1  •  fentaNYL (DURAGESIC) 12 mcg/hr, Place 1 patch on the skin every 3 (three) days. Use with 25 Mcg patch for total of 37 mcg., Disp: 10 patch, Rfl: 0  •  fentaNYL (DURAGESIC) 25 mcg/hr, Place 1 patch on the skin See admin instr. Apply 1 patch topically every 72 hours. Use with 12mcg, for 37 mcg total., Disp: 10 patch, Rfl: 0  •  glipiZIDE (GLUCOTROL) 10 mg tablet, Take 1 tablet (10 mg total) by mouth 2 (two) times a day with meals., Disp: 180 tablet, Rfl: 1  •  hydrochlorothiazide (HYDRODIURIL) 25 mg tablet, take 1 tablet by mouth daily, Disp: 90 tablet, Rfl: 1  •  lancets (freestyle) 28 gauge misc, 1 strip 2 (two) times a day., Disp: 100 each, Rfl: 3  •  lansoprazole (PREVACID) 30 mg capsule, take orally 1 capsule daily before breakfast, Disp: 30 capsule, Rfl: 2  •  lifitegrast (XIIDRA) 5 % dropperette dropperette, Administer 0.05 vials (1 drop total) into both eyes 2 (two) times a day. (Patient not taking: Reported on 8/27/2020 ), Disp: 60 vial, Rfl: 3  •  metFORMIN (GLUCOPHAGE) 500 mg tablet, Take 2 tablets (1,000 mg total) by mouth 2 (two) times a day with  meals., Disp: 360 tablet, Rfl: 1  •  metoprolol succinate XL (TOPROL-XL) 100 mg 24 hr tablet, Take 1 tablet (100 mg total) by mouth daily., Disp: 90 tablet, Rfl: 1  •  OLANZapine (ZYPREXA) 2.5 mg tablet, Take 2.5 mg by mouth nightly., Disp: , Rfl:   •  olmesartan (BENICAR) 40 mg tablet, take 1 tablet by mouth daily, Disp: 30 tablet, Rfl: 6  •  ondansetron ODT (ZOFRAN-ODT) 4 mg disintegrating tablet, Take 4 mg by mouth every 6 (six) hours as needed., Disp: , Rfl:   •  oxyCODONE (ROXICODONE) 5 mg immediate release tablet, Take 1 tablet (5 mg total) by mouth every 4 (four) hours as needed for moderate pain., Disp: 180 tablet, Rfl: 0      BP Readings from Last 3 Encounters:   08/27/20 140/80   01/29/20 126/78   12/30/19 140/74       Recent Lab results:  Lab Results   Component Value Date    CHOL 218 (H) 12/30/2019   ,   Lab Results   Component Value Date    HDL 44 12/30/2019   ,   Lab Results   Component Value Date    LDLCALC 108 (H) 12/30/2019   ,   Lab Results   Component Value Date    TRIG 332 (H) 12/30/2019        Lab Results   Component Value Date    GLUCOSE 138 (H) 12/30/2019   ,   Lab Results   Component Value Date    HGBA1C 6.9 (H) 12/30/2019         Lab Results   Component Value Date    CREATININE 0.57 12/30/2019       Lab Results   Component Value Date    TSH 4.890 (H) 12/30/2019

## 2020-12-15 DIAGNOSIS — M51.369 DEGENERATION OF LUMBAR INTERVERTEBRAL DISC: ICD-10-CM

## 2020-12-15 DIAGNOSIS — F41.9 ANXIETY: ICD-10-CM

## 2020-12-15 NOTE — TELEPHONE ENCOUNTER
Will schedule appt with Dr Sullivan    Refill oxycodone 5-325 MG  Refill ybalrhlyb75 Mg  Refill fentanyl 25 MG  Refill xanax 0.5 MG  Refill metoprolol succinate 100 MG    [facility] [address]

## 2020-12-16 RX ORDER — FENTANYL 12.5 UG/1
1 PATCH TRANSDERMAL
Qty: 10 PATCH | Refills: 0 | Status: SHIPPED | OUTPATIENT
Start: 2020-12-16 | End: 2021-01-13 | Stop reason: SDUPTHER

## 2020-12-16 RX ORDER — OXYCODONE HYDROCHLORIDE 5 MG/1
5 TABLET ORAL EVERY 4 HOURS PRN
Qty: 180 TABLET | Refills: 0 | Status: SHIPPED | OUTPATIENT
Start: 2020-12-16 | End: 2021-01-13 | Stop reason: SDUPTHER

## 2020-12-16 RX ORDER — METOPROLOL SUCCINATE 100 MG/1
100 TABLET, EXTENDED RELEASE ORAL DAILY
Qty: 90 TABLET | Refills: 1 | Status: SHIPPED | OUTPATIENT
Start: 2020-12-16 | End: 2021-09-29 | Stop reason: SDUPTHER

## 2020-12-16 RX ORDER — ALPRAZOLAM 0.5 MG/1
1 TABLET ORAL 3 TIMES DAILY PRN
Qty: 90 TABLET | Refills: 0 | Status: SHIPPED | OUTPATIENT
Start: 2020-12-16 | End: 2021-01-13 | Stop reason: SDUPTHER

## 2020-12-16 RX ORDER — FENTANYL 25 UG/1
1 PATCH TRANSDERMAL SEE ADMIN INSTRUCTIONS
Qty: 10 PATCH | Refills: 0 | Status: SHIPPED | OUTPATIENT
Start: 2020-12-16 | End: 2021-01-13 | Stop reason: SDUPTHER

## 2020-12-16 NOTE — TELEPHONE ENCOUNTER
Medicine Refill Request    Last Office Visit: 8/27/2020  Last Telemedicine Visit: 7/22/2020 Emily Sullivan MD    Next Office Visit: 1/13/2021  Next Telemedicine Visit: Visit date not found         Current Outpatient Medications:   •  albuterol HFA (VENTOLIN HFA) 90 mcg/actuation inhaler, Inhale 2 puffs every 6 (six) hours as needed for wheezing., Disp: 1 Inhaler, Rfl: 3  •  ALPRAZolam (XANAX) 0.5 mg tablet, Take 2 tablets (1 mg total) by mouth 3 (three) times a day as needed for anxiety for up to 15 days., Disp: 90 tablet, Rfl: 0  •  blood-glucose meter (PRECISION XTRA MONITOR) misc, 1 strip 2 (two) times a day., Disp: 1 each, Rfl: 0  •  doxycycline (MONODOX) 100 mg capsule, Take 100 mg by mouth 2 (two) times a day., Disp: , Rfl:   •  escitalopram (LEXAPRO) 20 mg tablet, Take 1 tablet (20 mg total) by mouth daily., Disp: 30 tablet, Rfl: 1  •  fentaNYL (DURAGESIC) 12 mcg/hr, Place 1 patch on the skin every 3 (three) days. Use with 25 Mcg patch for total of 37 mcg., Disp: 10 patch, Rfl: 0  •  fentaNYL (DURAGESIC) 25 mcg/hr, Place 1 patch on the skin See admin instr. Apply 1 patch topically every 72 hours. Use with 12mcg, for 37 mcg total., Disp: 10 patch, Rfl: 0  •  glipiZIDE (GLUCOTROL) 10 mg tablet, Take 1 tablet (10 mg total) by mouth 2 (two) times a day with meals., Disp: 180 tablet, Rfl: 1  •  hydrochlorothiazide (HYDRODIURIL) 25 mg tablet, Take 1 tablet (25 mg total) by mouth once daily., Disp: 90 tablet, Rfl: 1  •  lancets (freestyle) 28 gauge misc, 1 strip 2 (two) times a day., Disp: 100 each, Rfl: 3  •  lansoprazole (PREVACID) 30 mg capsule, take orally 1 capsule daily before breakfast, Disp: 30 capsule, Rfl: 2  •  lifitegrast (XIIDRA) 5 % dropperette dropperette, Administer 0.05 vials (1 drop total) into both eyes 2 (two) times a day. (Patient not taking: Reported on 8/27/2020 ), Disp: 60 vial, Rfl: 3  •  metFORMIN (GLUCOPHAGE) 500 mg tablet, Take 2 tablets (1,000 mg total) by mouth 2 (two) times a day  with meals., Disp: 360 tablet, Rfl: 1  •  metoprolol succinate XL (TOPROL-XL) 100 mg 24 hr tablet, Take 1 tablet (100 mg total) by mouth daily., Disp: 90 tablet, Rfl: 1  •  OLANZapine (ZYPREXA) 2.5 mg tablet, Take 2.5 mg by mouth nightly., Disp: , Rfl:   •  olmesartan (BENICAR) 40 mg tablet, take 1 tablet by mouth daily, Disp: 30 tablet, Rfl: 6  •  ondansetron ODT (ZOFRAN-ODT) 4 mg disintegrating tablet, Take 4 mg by mouth every 6 (six) hours as needed., Disp: , Rfl:   •  oxyCODONE (ROXICODONE) 5 mg immediate release tablet, Take 1 tablet (5 mg total) by mouth every 4 (four) hours as needed for moderate pain., Disp: 180 tablet, Rfl: 0      BP Readings from Last 3 Encounters:   08/27/20 140/80   01/29/20 126/78   12/30/19 140/74       Recent Lab results:  Lab Results   Component Value Date    CHOL 218 (H) 12/30/2019   ,   Lab Results   Component Value Date    HDL 44 12/30/2019   ,   Lab Results   Component Value Date    LDLCALC 108 (H) 12/30/2019   ,   Lab Results   Component Value Date    TRIG 332 (H) 12/30/2019        Lab Results   Component Value Date    GLUCOSE 138 (H) 12/30/2019   ,   Lab Results   Component Value Date    HGBA1C 6.9 (H) 12/30/2019         Lab Results   Component Value Date    CREATININE 0.57 12/30/2019       Lab Results   Component Value Date    TSH 4.890 (H) 12/30/2019

## 2021-01-11 RX ORDER — LANSOPRAZOLE 30 MG/1
CAPSULE, DELAYED RELEASE ORAL
Qty: 30 CAPSULE | Refills: 2 | Status: SHIPPED | OUTPATIENT
Start: 2021-01-11 | End: 2021-10-28 | Stop reason: SDUPTHER

## 2021-01-13 DIAGNOSIS — F41.9 ANXIETY: ICD-10-CM

## 2021-01-13 DIAGNOSIS — M51.369 DEGENERATION OF LUMBAR INTERVERTEBRAL DISC: ICD-10-CM

## 2021-01-13 RX ORDER — FENTANYL 25 UG/1
1 PATCH TRANSDERMAL SEE ADMIN INSTRUCTIONS
Qty: 10 PATCH | Refills: 0 | Status: SHIPPED | OUTPATIENT
Start: 2021-01-13 | End: 2021-02-12 | Stop reason: SDUPTHER

## 2021-01-13 RX ORDER — OXYCODONE HYDROCHLORIDE 5 MG/1
5 TABLET ORAL EVERY 4 HOURS PRN
Qty: 180 TABLET | Refills: 0 | Status: SHIPPED | OUTPATIENT
Start: 2021-01-13 | End: 2021-02-12 | Stop reason: SDUPTHER

## 2021-01-13 RX ORDER — FENTANYL 12.5 UG/1
1 PATCH TRANSDERMAL
Qty: 10 PATCH | Refills: 0 | Status: SHIPPED | OUTPATIENT
Start: 2021-01-13 | End: 2021-02-12 | Stop reason: SDUPTHER

## 2021-01-13 RX ORDER — ALPRAZOLAM 0.5 MG/1
1 TABLET ORAL 3 TIMES DAILY PRN
Qty: 90 TABLET | Refills: 0 | Status: SHIPPED | OUTPATIENT
Start: 2021-01-13 | End: 2021-02-06 | Stop reason: SDUPTHER

## 2021-01-13 NOTE — TELEPHONE ENCOUNTER
Patient states had trouble with phone, missed telemed today.    Has telemed scheduled for 1/25 with Dr Sullivan    Refchrissie fentanyl patch 12 mcg  Refill fentanyl patch 25 mcg  Refill oxycodone 5 Mg  Refill xanax 0.5 Mg    [facility]

## 2021-01-25 ENCOUNTER — TELEMEDICINE (OUTPATIENT)
Dept: FAMILY MEDICINE | Facility: CLINIC | Age: 56
End: 2021-01-25
Payer: COMMERCIAL

## 2021-01-25 DIAGNOSIS — F41.9 ANXIETY: ICD-10-CM

## 2021-01-25 DIAGNOSIS — N30.00 ACUTE CYSTITIS WITHOUT HEMATURIA: ICD-10-CM

## 2021-01-25 DIAGNOSIS — F33.1 MDD (MAJOR DEPRESSIVE DISORDER), RECURRENT EPISODE, MODERATE (CMS/HCC): ICD-10-CM

## 2021-01-25 DIAGNOSIS — E11.9 TYPE 2 DIABETES MELLITUS WITHOUT COMPLICATION, WITHOUT LONG-TERM CURRENT USE OF INSULIN (CMS/HCC): ICD-10-CM

## 2021-01-25 DIAGNOSIS — G89.4 CHRONIC PAIN SYNDROME: Primary | ICD-10-CM

## 2021-01-25 PROCEDURE — 99212 OFFICE O/P EST SF 10 MIN: CPT | Mod: 95 | Performed by: INTERNAL MEDICINE

## 2021-01-25 RX ORDER — SULFAMETHOXAZOLE AND TRIMETHOPRIM 800; 160 MG/1; MG/1
1 TABLET ORAL 2 TIMES DAILY
Qty: 20 TABLET | Refills: 0 | Status: SHIPPED | OUTPATIENT
Start: 2021-01-25 | End: 2021-02-04

## 2021-01-25 ASSESSMENT — PATIENT HEALTH QUESTIONNAIRE - PHQ9: SUM OF ALL RESPONSES TO PHQ9 QUESTIONS 1 & 2: 0

## 2021-02-06 PROBLEM — N30.00 ACUTE CYSTITIS WITHOUT HEMATURIA: Status: ACTIVE | Noted: 2021-02-06

## 2021-02-06 RX ORDER — ALPRAZOLAM 0.5 MG/1
1 TABLET ORAL 3 TIMES DAILY PRN
Qty: 90 TABLET | Refills: 2 | Status: SHIPPED | OUTPATIENT
Start: 2021-02-06 | End: 2021-06-17 | Stop reason: SDUPTHER

## 2021-02-06 NOTE — PROGRESS NOTES
Verification of Patient Location:  The patient affirms they are currently located in the following state:[place]     Request for Consent:  You and I are about to have a telemedicine check-in or visit. This is allowed because you are already my patient, and you have requested it.  This telemedicine visit will be billed to your health insurance or you, if you are self-insured.  You understand you will be responsible for any copayments or coinsurances that apply to your telemedicine visit.  Before starting our telemedicine visit, I am required to get your consent for this virtual check-in or visit by telemedicine. Do you consent?      Patient Response to Request for Consent: Yes    The following have been reviewed and updated as appropriate in this visit:  Tobacco  Allergies  Meds  Problems  Med Hx  Surg Hx  Fam Hx  Soc Hx          Visit Documentation:  Patient is a 55-year-old woman with a past medical history of chronic pain syndrome, diabetes, panic attacks and anxiety who presents for telemedicine visit for review of medication.  Patient is also having urinary tract symptoms at this time.    Patient denies chest pain, shortness of breath, palpitations.  Denies GI issues.  Does complain of low back pain, urinary frequency and urgency.    Chronic pain syndrome: Patient is on oxycodone 180 tablets for 1 month in addition she is also on fentanyl 37 mcg patch every 3 days.  Discussions have been had about lowering dose or seeing chronic pain management.  Patient reports that this is a longstanding regimen.  And is not having any untoward side effects at this time.    Anxiety and panic attacks: Patient is on generic Lexapro 20 mg daily and alprazolam 0.5 mg 3 times a day.  Patient has had a recent increase in stress with multiple social stressors at this time.  Alprazolam was increased to 1 mg 3 times a day.  We did discuss that this increase is temporary.  However at this time we will leave this dose  at this level.    UTI: Patient with symptoms of urgency, frequency low back discomfort and pressure different from her sciatic pain.  Will treat with TMP sulfa.    Follow-up patient will follow-up in 3 months sooner if any further issues.        Time Spent:  I spent 18 minutes on this date of service performing the following activities: obtaining history, documenting and providing counseling and education.

## 2021-02-12 ENCOUNTER — TELEPHONE (OUTPATIENT)
Dept: FAMILY MEDICINE | Facility: CLINIC | Age: 56
End: 2021-02-12

## 2021-02-12 DIAGNOSIS — M51.369 DEGENERATION OF LUMBAR INTERVERTEBRAL DISC: ICD-10-CM

## 2021-02-12 RX ORDER — FENTANYL 12.5 UG/1
1 PATCH TRANSDERMAL
Qty: 10 PATCH | Refills: 0 | Status: SHIPPED | OUTPATIENT
Start: 2021-02-16 | End: 2021-03-12 | Stop reason: SDUPTHER

## 2021-02-12 RX ORDER — FENTANYL 25 UG/1
1 PATCH TRANSDERMAL SEE ADMIN INSTRUCTIONS
Qty: 10 PATCH | Refills: 0 | Status: SHIPPED | OUTPATIENT
Start: 2021-02-16 | End: 2021-03-12 | Stop reason: SDUPTHER

## 2021-02-12 RX ORDER — OXYCODONE HYDROCHLORIDE 5 MG/1
5 TABLET ORAL EVERY 4 HOURS PRN
Qty: 180 TABLET | Refills: 0 | Status: SHIPPED | OUTPATIENT
Start: 2021-02-16 | End: 2021-03-12 | Stop reason: SDUPTHER

## 2021-02-12 NOTE — TELEPHONE ENCOUNTER
Patient contacted office to request prescription refill:    Name of medication:fentalyl   Strength of medication:12 mcg  SI patch every 3 days  Quantity:10  Requested number refills:0  Preferred Pharmacy:[facility]  Pharmacy number:[phone]    Patient contacted office to request prescription refill:    Name of medication:fentalyl  Strength of medication:25 mcg  SI every 3 days  Quantity:10  Requested number refills 0  Preferred Pharmacy:same  Pharmacy number:    Patient contacted office to request prescription refill:    Name of medication:oxycodone  Strength of medication:5 mg  SIG:every 4 hrs  Quantity:180  Requested number refills:0  Preferred Pharmacy:same  Pharmacy number:same

## 2021-02-12 NOTE — TELEPHONE ENCOUNTER
Medicine Refill Request    Last Office Visit: 8/27/2020  Last Telemedicine Visit: 1/25/2021 Emily Sullivan MD    Next Office Visit: Visit date not found  Next Telemedicine Visit: Visit date not found         Current Outpatient Medications:   •  ALPRAZolam (XANAX) 0.5 mg tablet, Take 2 tablets (1 mg total) by mouth 3 (three) times a day as needed for anxiety for up to 15 days., Disp: 90 tablet, Rfl: 2  •  blood-glucose meter (PRECISION XTRA MONITOR) misc, 1 strip 2 (two) times a day., Disp: 1 each, Rfl: 0  •  escitalopram (LEXAPRO) 20 mg tablet, Take 1 tablet (20 mg total) by mouth daily., Disp: 30 tablet, Rfl: 1  •  fentaNYL (DURAGESIC) 12 mcg/hr, Place 1 patch on the skin every 3 (three) days. Use with 25 Mcg patch for total of 37 mcg., Disp: 10 patch, Rfl: 0  •  fentaNYL (DURAGESIC) 25 mcg/hr, Place 1 patch on the skin See admin instr. Apply 1 patch topically every 72 hours. Use with 12mcg, for 37 mcg total., Disp: 10 patch, Rfl: 0  •  hydrochlorothiazide (HYDRODIURIL) 25 mg tablet, Take 1 tablet (25 mg total) by mouth once daily., Disp: 90 tablet, Rfl: 1  •  lancets (freestyle) 28 gauge misc, 1 strip 2 (two) times a day., Disp: 100 each, Rfl: 3  •  lansoprazole (PREVACID) 30 mg capsule, take 1 capsule orally once daily before breakfast, Disp: 30 capsule, Rfl: 2  •  metFORMIN (GLUCOPHAGE) 500 mg tablet, Take 2 tablets (1,000 mg total) by mouth 2 (two) times a day with meals., Disp: 360 tablet, Rfl: 1  •  metoprolol succinate XL (TOPROL-XL) 100 mg 24 hr tablet, Take 1 tablet (100 mg total) by mouth daily., Disp: 90 tablet, Rfl: 1  •  olmesartan (BENICAR) 40 mg tablet, take 1 tablet by mouth daily, Disp: 30 tablet, Rfl: 6  •  oxyCODONE (ROXICODONE) 5 mg immediate release tablet, Take 1 tablet (5 mg total) by mouth every 4 (four) hours as needed for moderate pain., Disp: 180 tablet, Rfl: 0  •  SITagliptin (JANUVIA) 100 mg tablet, Take 100 mg by mouth daily., Disp: , Rfl:       BP Readings from Last 3 Encounters:    08/27/20 140/80   01/29/20 126/78   12/30/19 140/74       Recent Lab results:  Lab Results   Component Value Date    CHOL 218 (H) 12/30/2019   ,   Lab Results   Component Value Date    HDL 44 12/30/2019   ,   Lab Results   Component Value Date    LDLCALC 108 (H) 12/30/2019   ,   Lab Results   Component Value Date    TRIG 332 (H) 12/30/2019        Lab Results   Component Value Date    GLUCOSE 138 (H) 12/30/2019   ,   Lab Results   Component Value Date    HGBA1C 6.9 (H) 12/30/2019         Lab Results   Component Value Date    CREATININE 0.57 12/30/2019       Lab Results   Component Value Date    TSH 4.890 (H) 12/30/2019

## 2021-03-03 ENCOUNTER — TELEPHONE (OUTPATIENT)
Dept: FAMILY MEDICINE | Facility: CLINIC | Age: 56
End: 2021-03-03

## 2021-03-03 DIAGNOSIS — R31.9 HEMATURIA OF UNKNOWN CAUSE: Primary | ICD-10-CM

## 2021-03-03 PROBLEM — N30.90 RECURRENT CYSTITIS: Status: ACTIVE | Noted: 2021-03-03

## 2021-03-12 DIAGNOSIS — M51.369 DEGENERATION OF LUMBAR INTERVERTEBRAL DISC: ICD-10-CM

## 2021-03-12 RX ORDER — OXYCODONE HYDROCHLORIDE 5 MG/1
5 TABLET ORAL EVERY 4 HOURS PRN
Qty: 180 TABLET | Refills: 0 | Status: SHIPPED | OUTPATIENT
Start: 2021-03-16 | End: 2021-04-15 | Stop reason: SDUPTHER

## 2021-03-12 RX ORDER — FENTANYL 12.5 UG/1
1 PATCH TRANSDERMAL
Qty: 10 PATCH | Refills: 0 | Status: SHIPPED | OUTPATIENT
Start: 2021-03-16 | End: 2021-04-15 | Stop reason: SDUPTHER

## 2021-03-12 RX ORDER — FENTANYL 25 UG/1
1 PATCH TRANSDERMAL SEE ADMIN INSTRUCTIONS
Qty: 10 PATCH | Refills: 0 | Status: SHIPPED | OUTPATIENT
Start: 2021-03-16 | End: 2021-04-15 | Stop reason: SDUPTHER

## 2021-03-12 NOTE — TELEPHONE ENCOUNTER
Patient contacted office to request prescription refill:    Name of medication:fentalyn  Strength of medication:12 mcg  SIG:every 3 days  Quantity:10  Requested number refills:0  Preferred Pharmacy[place] [place]  Pharmacy number:[phone]    Patient contacted office to request prescription refill:    Name of medication:fentanly  Strength of medication:25  SIG:*every 3 days  Quantity:10  Requested number refills:0  Preferred Pharmacy:same  Pharmacy number:same    Patient contacted office to request prescription refill:    Name of medication:oxycodone  Strength of medication:5 mg  SIG:every 4 hrs  Quantity:180  Requested number refills:0  Preferred Pharmacy:same  Pharmacy number:same

## 2021-03-17 ENCOUNTER — TELEPHONE (OUTPATIENT)
Dept: FAMILY MEDICINE | Facility: CLINIC | Age: 56
End: 2021-03-17

## 2021-03-17 NOTE — TELEPHONE ENCOUNTER
Patient called and said that she needs a prior auth for the following medications:    -fentanyl 12mcg  -fentanyl 25mcg  -oxycodone 5mg    Patient can be reached at [phone]

## 2021-03-18 ENCOUNTER — TELEPHONE (OUTPATIENT)
Dept: FAMILY MEDICINE | Facility: CLINIC | Age: 56
End: 2021-03-18

## 2021-03-18 NOTE — TELEPHONE ENCOUNTER
Prior authorization was sent to the ins for fentanyl 25mcg and 12 mcg patch also oxycodone waiting on insurance response. Ins id # [id number],bin [id number],pcn [id number]

## 2021-03-18 NOTE — TELEPHONE ENCOUNTER
Spoke with patients  regarding prior auths. Has been submitted to insurance and we are waiting on approval

## 2021-03-19 NOTE — TELEPHONE ENCOUNTER
Spoke to future scripts fentanyl 25 and 12 mcg patches also oxycodone [facility] 5 mg has been approved from 03/18/2021 - 03/18/2022, need a controlled medication form for 2021 for our records.    Dx codes m47.814 - m54.42 - m51.36     Medications used and failed   ;  Gabapentin,celebrex,hydrocodoneand seroids    Seen pain management Dr Kerline Stevens ins info : bin [id number]  n # [id number]  Ins id # [id number]        Called yessica pineda  and gave her the up dated information

## 2021-03-29 ENCOUNTER — TELEPHONE (OUTPATIENT)
Dept: FAMILY MEDICINE | Facility: CLINIC | Age: 56
End: 2021-03-29

## 2021-03-29 NOTE — TELEPHONE ENCOUNTER
Left voicemail for patient to call and move May 20TH appt to earlier time with Dr Sullivan in April to complete disability paperwork

## 2021-03-30 ENCOUNTER — TRANSCRIBE ORDERS (OUTPATIENT)
Dept: SCHEDULING | Age: 56
End: 2021-03-30

## 2021-03-30 DIAGNOSIS — M54.16 RADICULOPATHY, LUMBAR REGION: Primary | ICD-10-CM

## 2021-03-31 ENCOUNTER — TELEPHONE (OUTPATIENT)
Dept: FAMILY MEDICINE | Facility: CLINIC | Age: 56
End: 2021-03-31

## 2021-03-31 DIAGNOSIS — Z12.11 SCREENING FOR COLON CANCER: Primary | ICD-10-CM

## 2021-03-31 NOTE — TELEPHONE ENCOUNTER
Spoke with pt. Mailing mammo and FIT orders. Wants to do FIT test over colonoscopy, could not tolerate the prep when she tried before. She stated she has a lot going on with spinal stenosis and herniated discs. Explained to her the orders are good for a while and does not have to be completed in the next month.

## 2021-04-05 ENCOUNTER — HOSPITAL ENCOUNTER (OUTPATIENT)
Dept: RADIOLOGY | Age: 56
Discharge: HOME | End: 2021-04-05
Attending: ANESTHESIOLOGY
Payer: COMMERCIAL

## 2021-04-05 DIAGNOSIS — M54.16 RADICULOPATHY, LUMBAR REGION: ICD-10-CM

## 2021-04-15 DIAGNOSIS — M51.369 DEGENERATION OF LUMBAR INTERVERTEBRAL DISC: ICD-10-CM

## 2021-04-15 RX ORDER — FENTANYL 25 UG/1
1 PATCH TRANSDERMAL SEE ADMIN INSTRUCTIONS
Qty: 10 PATCH | Refills: 0 | Status: SHIPPED | OUTPATIENT
Start: 2021-04-15 | End: 2021-05-17 | Stop reason: SDUPTHER

## 2021-04-15 RX ORDER — FENTANYL 12.5 UG/1
1 PATCH TRANSDERMAL
Qty: 10 PATCH | Refills: 0 | Status: SHIPPED | OUTPATIENT
Start: 2021-04-15 | End: 2021-05-17 | Stop reason: SDUPTHER

## 2021-04-15 RX ORDER — OXYCODONE HYDROCHLORIDE 5 MG/1
5 TABLET ORAL EVERY 4 HOURS PRN
Qty: 180 TABLET | Refills: 0 | Status: SHIPPED | OUTPATIENT
Start: 2021-04-15 | End: 2021-05-15

## 2021-04-15 NOTE — TELEPHONE ENCOUNTER
Patient contacted office to request prescription refill:    Name of medication:Oxycodone  Strength of medication:5mg  SIG:Take 1 tablet (5 mg total) by mouth every 4 (four) hours as needed for moderate pain  Quantity:180  Requested number refills:0  Preferred Pharmacy:[facility]  Pharmacy number:[phone]

## 2021-04-15 NOTE — TELEPHONE ENCOUNTER
Patient contacted office to request prescription refill:    Name of medication:Fentanyl Patch  Strength of medication:25 mcg/hr  SIG:Place 1 patch on the skin See admin instr. Apply 1 patch topically every 72 hours. Use with 12mcg, for 37 mcg total  Quantity:10  Requested number refills:0  Preferred Pharmacy:[facility]  Pharmacy number:[phone]

## 2021-04-15 NOTE — TELEPHONE ENCOUNTER
Patient contacted office to request prescription refill:  Name of medication:Fentanyl patch  Strength of medication:12mcg/hr  SIG:Place 1 patch on the skin every 3 (three) days. Use with 25 Mcg patch for total of 37 mcg.,   Quantity:10  Requested number refills:0  Preferred Pharmacy:[facility]  Pharmacy number:[phone]

## 2021-04-19 ENCOUNTER — OFFICE VISIT (OUTPATIENT)
Dept: FAMILY MEDICINE | Facility: CLINIC | Age: 56
End: 2021-04-19
Payer: COMMERCIAL

## 2021-04-19 VITALS
SYSTOLIC BLOOD PRESSURE: 120 MMHG | WEIGHT: 180 LBS | DIASTOLIC BLOOD PRESSURE: 74 MMHG | HEIGHT: 66 IN | HEART RATE: 63 BPM | BODY MASS INDEX: 28.93 KG/M2 | OXYGEN SATURATION: 97 % | TEMPERATURE: 96.6 F | RESPIRATION RATE: 16 BRPM

## 2021-04-19 DIAGNOSIS — M51.369 DDD (DEGENERATIVE DISC DISEASE), LUMBAR: ICD-10-CM

## 2021-04-19 DIAGNOSIS — F41.9 ANXIETY: ICD-10-CM

## 2021-04-19 DIAGNOSIS — I10 HYPERTENSION, ESSENTIAL: ICD-10-CM

## 2021-04-19 DIAGNOSIS — F33.1 MDD (MAJOR DEPRESSIVE DISORDER), RECURRENT EPISODE, MODERATE (CMS/HCC): ICD-10-CM

## 2021-04-19 DIAGNOSIS — G89.4 CHRONIC PAIN SYNDROME: ICD-10-CM

## 2021-04-19 DIAGNOSIS — E11.9 TYPE 2 DIABETES MELLITUS WITHOUT COMPLICATION, WITHOUT LONG-TERM CURRENT USE OF INSULIN (CMS/HCC): Primary | ICD-10-CM

## 2021-04-19 PROBLEM — M25.559 PAIN IN JOINT INVOLVING PELVIC REGION AND THIGH: Status: ACTIVE | Noted: 2021-04-19

## 2021-04-19 PROBLEM — G56.00 CARPAL TUNNEL SYNDROME: Status: ACTIVE | Noted: 2021-04-19

## 2021-04-19 PROBLEM — M51.26 DISPLACEMENT OF LUMBAR INTERVERTEBRAL DISC WITHOUT MYELOPATHY: Status: ACTIVE | Noted: 2021-04-19

## 2021-04-19 PROBLEM — M54.16 LUMBAR RADICULOPATHY: Status: ACTIVE | Noted: 2021-04-19

## 2021-04-19 PROBLEM — M50.20 DISPLACEMENT OF CERVICAL INTERVERTEBRAL DISC WITHOUT MYELOPATHY: Status: ACTIVE | Noted: 2021-04-19

## 2021-04-19 PROBLEM — M25.551 RIGHT HIP PAIN: Status: ACTIVE | Noted: 2021-04-19

## 2021-04-19 PROCEDURE — 3074F SYST BP LT 130 MM HG: CPT | Performed by: INTERNAL MEDICINE

## 2021-04-19 PROCEDURE — 3008F BODY MASS INDEX DOCD: CPT | Performed by: INTERNAL MEDICINE

## 2021-04-19 PROCEDURE — 99214 OFFICE O/P EST MOD 30 MIN: CPT | Performed by: INTERNAL MEDICINE

## 2021-04-19 PROCEDURE — 3078F DIAST BP <80 MM HG: CPT | Performed by: INTERNAL MEDICINE

## 2021-04-19 RX ORDER — METFORMIN HYDROCHLORIDE 500 MG/1
500 TABLET ORAL 2 TIMES DAILY WITH MEALS
Qty: 180 TABLET | Refills: 1 | Status: SHIPPED | OUTPATIENT
Start: 2021-04-19 | End: 2021-07-19 | Stop reason: SDUPTHER

## 2021-04-19 RX ORDER — GABAPENTIN 300 MG/1
1 CAPSULE ORAL EVERY 8 HOURS
COMMUNITY
Start: 2021-02-24 | End: 2021-10-28 | Stop reason: DRUGHIGH

## 2021-04-19 ASSESSMENT — PATIENT HEALTH QUESTIONNAIRE - PHQ9: SUM OF ALL RESPONSES TO PHQ9 QUESTIONS 1 & 2: 2

## 2021-04-20 ENCOUNTER — TELEPHONE (OUTPATIENT)
Dept: FAMILY MEDICINE | Facility: CLINIC | Age: 56
End: 2021-04-20

## 2021-04-20 LAB
ALBUMIN SERPL-MCNC: 4.4 G/DL (ref 3.8–4.9)
ALBUMIN/CREAT UR: 31 MG/G CREAT (ref 0–29)
ALBUMIN/GLOB SERPL: 1.7 {RATIO} (ref 1.2–2.2)
ALP SERPL-CCNC: 67 IU/L (ref 39–117)
ALT SERPL-CCNC: 15 IU/L (ref 0–32)
AST SERPL-CCNC: 11 IU/L (ref 0–40)
BASOPHILS # BLD AUTO: 0.3 X10E3/UL (ref 0–0.2)
BASOPHILS NFR BLD AUTO: 2 %
BILIRUB SERPL-MCNC: 0.3 MG/DL (ref 0–1.2)
BUN SERPL-MCNC: 19 MG/DL (ref 6–24)
BUN/CREAT SERPL: 28 (ref 9–23)
CALCIUM SERPL-MCNC: 10 MG/DL (ref 8.7–10.2)
CHLORIDE SERPL-SCNC: 102 MMOL/L (ref 96–106)
CHOLEST SERPL-MCNC: 219 MG/DL (ref 100–199)
CO2 SERPL-SCNC: 16 MMOL/L (ref 20–29)
CREAT SERPL-MCNC: 0.68 MG/DL (ref 0.57–1)
CREAT UR-MCNC: 75.8 MG/DL
EOSINOPHIL # BLD AUTO: 0.5 X10E3/UL (ref 0–0.4)
EOSINOPHIL NFR BLD AUTO: 3 %
ERYTHROCYTE [DISTWIDTH] IN BLOOD BY AUTOMATED COUNT: 13.6 % (ref 11.7–15.4)
GLOBULIN SER CALC-MCNC: 2.6 G/DL (ref 1.5–4.5)
GLUCOSE SERPL-MCNC: 397 MG/DL (ref 65–99)
HBA1C MFR BLD: 8.2 % (ref 4.8–5.6)
HCT VFR BLD AUTO: 48 % (ref 34–46.6)
HDLC SERPL-MCNC: 44 MG/DL
HGB BLD-MCNC: 16 G/DL (ref 11.1–15.9)
LAB CORP EGFR IF AFRICN AM: 113 ML/MIN/1.73
LAB CORP EGFR IF NONAFRICN AM: 98 ML/MIN/1.73
LDLC SERPL CALC-MCNC: 126 MG/DL (ref 0–99)
LYMPHOCYTES # BLD AUTO: 6.2 X10E3/UL (ref 0.7–3.1)
LYMPHOCYTES NFR BLD AUTO: 40 %
MCH RBC QN AUTO: 30.4 PG (ref 26.6–33)
MCHC RBC AUTO-ENTMCNC: 33.3 G/DL (ref 31.5–35.7)
MCV RBC AUTO: 91 FL (ref 79–97)
MICROALBUMIN UR-MCNC: 23.3 UG/ML
MONOCYTES # BLD AUTO: 0.8 X10E3/UL (ref 0.1–0.9)
MONOCYTES NFR BLD AUTO: 5 %
MORPHOLOGY BLD-IMP: ABNORMAL
NEUTROPHILS # BLD AUTO: 7.8 X10E3/UL (ref 1.4–7)
NEUTROPHILS NFR BLD AUTO: 50 %
PLATELET # BLD AUTO: 431 X10E3/UL (ref 150–450)
POTASSIUM SERPL-SCNC: 3.8 MMOL/L (ref 3.5–5.2)
PROT SERPL-MCNC: 7 G/DL (ref 6–8.5)
RBC # BLD AUTO: 5.27 X10E6/UL (ref 3.77–5.28)
SODIUM SERPL-SCNC: 138 MMOL/L (ref 134–144)
TRIGL SERPL-MCNC: 278 MG/DL (ref 0–149)
VLDLC SERPL CALC-MCNC: 49 MG/DL (ref 5–40)
WBC # BLD AUTO: 15.5 X10E3/UL (ref 3.4–10.8)

## 2021-04-25 ASSESSMENT — ENCOUNTER SYMPTOMS
GASTROINTESTINAL NEGATIVE: 1
NEUROLOGICAL NEGATIVE: 1
PSYCHIATRIC NEGATIVE: 1
ENDOCRINE NEGATIVE: 1
ARTHRALGIAS: 1
HEMATOLOGIC/LYMPHATIC NEGATIVE: 1
ALLERGIC/IMMUNOLOGIC NEGATIVE: 1
CARDIOVASCULAR NEGATIVE: 1
EYES NEGATIVE: 1
CONSTITUTIONAL NEGATIVE: 1
RESPIRATORY NEGATIVE: 1
BACK PAIN: 1

## 2021-04-25 NOTE — ASSESSMENT & PLAN NOTE
Patient with a history of degenerative disc disease.  With multiple flares.  Is on chronic pain medication.  She was evaluated by physician at a spine specialist and did receive a second epidural injection on April 13.

## 2021-04-25 NOTE — ASSESSMENT & PLAN NOTE
Patient is on Metformin 500 mg twice daily.  She is also on 100 mg of Januvia.  She is due for repeat labs at this time.  She is on an ARB, olmesartan 40 mg.

## 2021-04-25 NOTE — PROGRESS NOTES
"  Subjective     Patient ID: Hilda Cornelius is a 55 y.o. female.    HPI patient is here for follow-up depression, anxiety, diabetes chronic pain.    Review of Systems   Constitutional: Negative.    HENT: Negative.    Eyes: Negative.    Respiratory: Negative.    Cardiovascular: Negative.    Gastrointestinal: Negative.    Endocrine: Negative.    Genitourinary: Negative.    Musculoskeletal: Positive for arthralgias and back pain.   Skin: Negative.    Allergic/Immunologic: Negative.    Neurological: Negative.    Hematological: Negative.    Psychiatric/Behavioral: Negative.        Objective     Vitals:    04/19/21 1138   BP: 120/74   BP Location: Left upper arm   Patient Position: Sitting   Pulse: 63   Resp: 16   Temp: (!) 35.9 °C (96.6 °F)   TempSrc: Temporal   SpO2: 97%   Weight: 81.6 kg (180 lb)   Height: 1.676 m (5' 6\")     Body mass index is 29.05 kg/m².    Physical Exam  Vitals and nursing note reviewed.   Constitutional:       Appearance: She is well-developed.   HENT:      Head: Normocephalic and atraumatic.      Right Ear: External ear normal.      Left Ear: External ear normal.      Nose: Nose normal.   Eyes:      Conjunctiva/sclera: Conjunctivae normal.      Pupils: Pupils are equal, round, and reactive to light.   Cardiovascular:      Rate and Rhythm: Normal rate and regular rhythm.      Pulses: Normal pulses.      Heart sounds: Normal heart sounds. No murmur heard.   No gallop.    Pulmonary:      Effort: Pulmonary effort is normal.      Breath sounds: Normal breath sounds.   Abdominal:      General: Bowel sounds are normal.      Palpations: Abdomen is soft.   Musculoskeletal:         General: Tenderness present. No swelling or deformity. Normal range of motion.      Cervical back: Normal range of motion and neck supple.   Skin:     General: Skin is warm and dry.      Capillary Refill: Capillary refill takes less than 2 seconds.   Neurological:      General: No focal deficit present.      Mental Status: She " is alert and oriented to person, place, and time.      Cranial Nerves: No cranial nerve deficit.   Psychiatric:         Mood and Affect: Mood normal.         Behavior: Behavior normal.         Thought Content: Thought content normal.         Judgment: Judgment normal.         Assessment/Plan   Diagnoses and all orders for this visit:    Type 2 diabetes mellitus without complication, without long-term current use of insulin (CMS/[facility]) (Primary)  Assessment & Plan:  Patient is on Metformin 500 mg twice daily.  She is also on 100 mg of Januvia.  She is due for repeat labs at this time.  She is on an ARB, olmesartan 40 mg.    Orders:  -     CBC and Differential  -     Comprehensive metabolic panel  -     Hemoglobin A1c  -     Lipid panel  -     Microalbumin/Creatinine Ur Random    Chronic pain syndrome  Assessment & Plan:  Patient with a history of chronic pain syndrome.  Patient is presently on gabapentin, 37 mcg of fentanyl every 3 days.  And as needed oxycodone 5 mg every 4 hours as needed.      DDD (degenerative disc disease), lumbar  Assessment & Plan:  Patient with a history of degenerative disc disease.  With multiple flares.  Is on chronic pain medication.  She was evaluated by physician at a spine specialist and did receive a second epidural injection on April 13.      MDD (major depressive disorder), recurrent episode, moderate (CMS/[facility])  Assessment & Plan:  Patient has had an extremely eventful year as far as her depression is concerned.  She had multiple panic attacks.  She was treated for intensive outpatient psychiatric therapy.  She is being monitored by psychiatry.  Her disability paperwork has been completed and she has been approved for disability associated with her depression.  Patient will continue to follow with psychiatry.  Patient is on Escitalopram 20 mg daily as well is alprazolam 0.5 mg 3 times a day as needed.      Hypertension, essential  Assessment & Plan:  Blood pressure is well controlled  at this time.  Patient is on Metroprolol 100 mg 24 hours, olmesartan 40 mg, hydrochlorothiazide 25 mg daily      Anxiety    Other orders  -     metFORMIN (GLUCOPHAGE) 500 mg tablet; Take 1 tablet (500 mg total) by mouth 2 (two) times a day with meals.

## 2021-04-25 NOTE — ASSESSMENT & PLAN NOTE
Patient with a history of chronic pain syndrome.  Patient is presently on gabapentin, 37 mcg of fentanyl every 3 days.  And as needed oxycodone 5 mg every 4 hours as needed.

## 2021-04-25 NOTE — ASSESSMENT & PLAN NOTE
Blood pressure is well controlled at this time.  Patient is on Metroprolol 100 mg 24 hours, olmesartan 40 mg, hydrochlorothiazide 25 mg daily

## 2021-04-29 ENCOUNTER — TELEPHONE (OUTPATIENT)
Dept: FAMILY MEDICINE | Facility: CLINIC | Age: 56
End: 2021-04-29

## 2021-04-29 NOTE — TELEPHONE ENCOUNTER
----- Message from Steven Malhotra MA sent at 2021  7:28 AM EDT -----  Regarding: FW: Test Results Question  Contact: [phone]    ----- Message -----  From: Hilda Cornelius  Sent: 2021   5:00 PM EDT  To: [facility]  Subject: Test Results Question                            A lot of the test wer not in the acceptable range.  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  I had mentioned this to Dr. Phan in the past but don,t know where to do any follow ups.  I feel that my bloodwork is showing some raggedy looking possibilities.      Pleas let me know what you think.  Thank you and best wishes, [phone]    
Responded to patient via [employer] message.    Your hemoglobin A1c is at 8.2.  Which is elevated but it is down from the last hemoglobin A1c that I have of 10.2, and 11.2 before that.    Your cholesterol labs are elevated your total is 219 it should be less than 200.  Your triglycerides are 278 they should be less than 150.  Your LDL is 126 andfor a diabetic they should be way less than 100.  I do not see where you are on a cholesterol medicine.  And I would recommend going on one.    Your WBCs do show a little elevation at 15.5 I do not know if you were fighting some type of an infection when these were drawn.  But your hemoglobin is only 1/10 of a point above normal, and your hematocrit is to points above normal.  We should repeat these in about 6 months to see where you are.  But none of your previous hemoglobins have been elevated, so we will just keep an eye on this for now.    Your blood sugar was elevated, but I cannot recall if these were fasting labs.  You may need further adjustments in your diabetes meds.  It looks like you were started on Januvia by another physician.  Are you following with an endocrinologist.  Please let me know and we can adjust your meds if not.    Your kidney function actually looks okay.  You are spilling a small amount of protein in your urine.  Better glucose control will improve that.  Your BUN and creatinine ratio is elevated, that is an indication that you are mildly dehydrated.  Your carbon dioxide is low, and that is an effect of your sugar being high.  So please let me know if you are willing to go on cholesterol medicine.  Whether or not you are seeing endocrinologist.  And we will repeat your labs in July to look for trends.  Let me know if you have any further questions.    
1

## 2021-05-17 DIAGNOSIS — M51.369 DEGENERATION OF LUMBAR INTERVERTEBRAL DISC: ICD-10-CM

## 2021-05-17 NOTE — TELEPHONE ENCOUNTER
Medicine Refill Request    Last Office Visit: 4/19/2021  Last Telemedicine Visit: 1/25/2021 Emily Sullivan MD    Next Office Visit: 7/19/2021  Next Telemedicine Visit: Visit date not found         Current Outpatient Medications:   •  ALPRAZolam (XANAX) 0.5 mg tablet, Take 2 tablets (1 mg total) by mouth 3 (three) times a day as needed for anxiety for up to 15 days., Disp: 90 tablet, Rfl: 2  •  blood-glucose meter (PRECISION XTRA MONITOR) misc, 1 strip 2 (two) times a day., Disp: 1 each, Rfl: 0  •  escitalopram (LEXAPRO) 20 mg tablet, Take 1 tablet (20 mg total) by mouth daily., Disp: 30 tablet, Rfl: 1  •  fentaNYL (DURAGESIC) 12 mcg/hr, Place 1 patch on the skin every 3 (three) days. Use with 25 Mcg patch for total of 37 mcg., Disp: 10 patch, Rfl: 0  •  fentaNYL (DURAGESIC) 25 mcg/hr, Place 1 patch on the skin See admin instr. Apply 1 patch topically every 72 hours. Use with 12mcg, for 37 mcg total., Disp: 10 patch, Rfl: 0  •  gabapentin (NEURONTIN) 300 mg capsule, Take 1 capsule by mouth every 8 (eight) hours., Disp: , Rfl:   •  hydrochlorothiazide (HYDRODIURIL) 25 mg tablet, Take 1 tablet (25 mg total) by mouth once daily., Disp: 90 tablet, Rfl: 1  •  lancets (freestyle) 28 gauge misc, 1 strip 2 (two) times a day., Disp: 100 each, Rfl: 3  •  lansoprazole (PREVACID) 30 mg capsule, take 1 capsule orally once daily before breakfast, Disp: 30 capsule, Rfl: 2  •  metFORMIN (GLUCOPHAGE) 500 mg tablet, Take 1 tablet (500 mg total) by mouth 2 (two) times a day with meals., Disp: 180 tablet, Rfl: 1  •  metoprolol succinate XL (TOPROL-XL) 100 mg 24 hr tablet, Take 1 tablet (100 mg total) by mouth daily., Disp: 90 tablet, Rfl: 1  •  olmesartan (BENICAR) 40 mg tablet, take 1 tablet by mouth daily, Disp: 30 tablet, Rfl: 6  •  SITagliptin (JANUVIA) 100 mg tablet, Take 100 mg by mouth daily., Disp: , Rfl:       BP Readings from Last 3 Encounters:   04/19/21 120/74   08/27/20 140/80   01/29/20 126/78       Recent Lab  results:  Lab Results   Component Value Date    CHOL 219 (H) 04/19/2021   ,   Lab Results   Component Value Date    HDL 44 04/19/2021   ,   Lab Results   Component Value Date    LDLCALC 126 (H) 04/19/2021   ,   Lab Results   Component Value Date    TRIG 278 (H) 04/19/2021        Lab Results   Component Value Date    GLUCOSE 397 (H) 04/19/2021   ,   Lab Results   Component Value Date    HGBA1C 8.2 (H) 04/19/2021         Lab Results   Component Value Date    CREATININE 0.68 04/19/2021       Lab Results   Component Value Date    TSH 4.890 (H) 12/30/2019

## 2021-05-18 RX ORDER — OXYCODONE HYDROCHLORIDE 5 MG/1
5 TABLET ORAL EVERY 4 HOURS PRN
Qty: 180 TABLET | Refills: 0 | Status: SHIPPED | OUTPATIENT
Start: 2021-05-18 | End: 2021-06-17 | Stop reason: SDUPTHER

## 2021-05-18 RX ORDER — FENTANYL 25 UG/1
1 PATCH TRANSDERMAL SEE ADMIN INSTRUCTIONS
Qty: 10 PATCH | Refills: 0 | Status: SHIPPED | OUTPATIENT
Start: 2021-05-18 | End: 2021-06-17 | Stop reason: SDUPTHER

## 2021-05-18 RX ORDER — FENTANYL 12.5 UG/1
1 PATCH TRANSDERMAL
Qty: 10 PATCH | Refills: 0 | Status: SHIPPED | OUTPATIENT
Start: 2021-05-18 | End: 2021-06-17 | Stop reason: SDUPTHER

## 2021-06-17 DIAGNOSIS — M51.369 DEGENERATION OF LUMBAR INTERVERTEBRAL DISC: ICD-10-CM

## 2021-06-17 DIAGNOSIS — F41.9 ANXIETY: ICD-10-CM

## 2021-06-17 RX ORDER — FENTANYL 25 UG/1
1 PATCH TRANSDERMAL SEE ADMIN INSTRUCTIONS
Qty: 10 PATCH | Refills: 0 | Status: SHIPPED | OUTPATIENT
Start: 2021-06-17 | End: 2021-07-16 | Stop reason: SDUPTHER

## 2021-06-17 RX ORDER — ALPRAZOLAM 0.5 MG/1
1 TABLET ORAL 3 TIMES DAILY PRN
Qty: 90 TABLET | Refills: 2 | Status: SHIPPED | OUTPATIENT
Start: 2021-06-17 | End: 2021-08-17 | Stop reason: SDUPTHER

## 2021-06-17 RX ORDER — FENTANYL 12.5 UG/1
1 PATCH TRANSDERMAL
Qty: 10 PATCH | Refills: 0 | Status: SHIPPED | OUTPATIENT
Start: 2021-06-17 | End: 2021-07-16 | Stop reason: SDUPTHER

## 2021-06-17 RX ORDER — OXYCODONE HYDROCHLORIDE 5 MG/1
5 TABLET ORAL EVERY 4 HOURS PRN
Qty: 180 TABLET | Refills: 0 | Status: SHIPPED | OUTPATIENT
Start: 2021-06-17 | End: 2021-07-16 | Stop reason: SDUPTHER

## 2021-06-17 NOTE — TELEPHONE ENCOUNTER
Medicine Refill Request    Last Office Visit: 4/19/2021  Last Telemedicine Visit: 1/25/2021 Emily Sullivan MD    Next Office Visit: 7/19/2021  Next Telemedicine Visit: Visit date not found         Current Outpatient Medications:   •  ALPRAZolam (XANAX) 0.5 mg tablet, Take 2 tablets (1 mg total) by mouth 3 (three) times a day as needed for anxiety for up to 15 days., Disp: 90 tablet, Rfl: 2  •  blood-glucose meter (PRECISION XTRA MONITOR) misc, 1 strip 2 (two) times a day., Disp: 1 each, Rfl: 0  •  escitalopram (LEXAPRO) 20 mg tablet, Take 1 tablet (20 mg total) by mouth daily., Disp: 30 tablet, Rfl: 1  •  fentaNYL (DURAGESIC) 12 mcg/hr, Place 1 patch on the skin every 3 (three) days. Use with 25 Mcg patch for total of 37 mcg., Disp: 10 patch, Rfl: 0  •  fentaNYL (DURAGESIC) 25 mcg/hr, Place 1 patch on the skin See admin instr. Apply 1 patch topically every 72 hours. Use with 12mcg, for 37 mcg total., Disp: 10 patch, Rfl: 0  •  gabapentin (NEURONTIN) 300 mg capsule, Take 1 capsule by mouth every 8 (eight) hours., Disp: , Rfl:   •  hydrochlorothiazide (HYDRODIURIL) 25 mg tablet, Take 1 tablet (25 mg total) by mouth once daily., Disp: 90 tablet, Rfl: 1  •  lancets (freestyle) 28 gauge misc, 1 strip 2 (two) times a day., Disp: 100 each, Rfl: 3  •  lansoprazole (PREVACID) 30 mg capsule, take 1 capsule orally once daily before breakfast, Disp: 30 capsule, Rfl: 2  •  metFORMIN (GLUCOPHAGE) 500 mg tablet, Take 1 tablet (500 mg total) by mouth 2 (two) times a day with meals., Disp: 180 tablet, Rfl: 1  •  metoprolol succinate XL (TOPROL-XL) 100 mg 24 hr tablet, Take 1 tablet (100 mg total) by mouth daily., Disp: 90 tablet, Rfl: 1  •  olmesartan (BENICAR) 40 mg tablet, take 1 tablet by mouth daily, Disp: 30 tablet, Rfl: 6  •  oxyCODONE (ROXICODONE) 5 mg immediate release tablet, Take 1 tablet (5 mg total) by mouth every 4 (four) hours as needed for moderate pain., Disp: 180 tablet, Rfl: 0  •  SITagliptin (JANUVIA) 100 mg  tablet, Take 100 mg by mouth daily., Disp: , Rfl:       BP Readings from Last 3 Encounters:   04/19/21 120/74   08/27/20 140/80   01/29/20 126/78       Recent Lab results:  Lab Results   Component Value Date    CHOL 219 (H) 04/19/2021   ,   Lab Results   Component Value Date    HDL 44 04/19/2021   ,   Lab Results   Component Value Date    LDLCALC 126 (H) 04/19/2021   ,   Lab Results   Component Value Date    TRIG 278 (H) 04/19/2021        Lab Results   Component Value Date    GLUCOSE 397 (H) 04/19/2021   ,   Lab Results   Component Value Date    HGBA1C 8.2 (H) 04/19/2021         Lab Results   Component Value Date    CREATININE 0.68 04/19/2021       Lab Results   Component Value Date    TSH 4.890 (H) 12/30/2019

## 2021-07-16 DIAGNOSIS — M51.369 DEGENERATION OF LUMBAR INTERVERTEBRAL DISC: ICD-10-CM

## 2021-07-16 RX ORDER — OXYCODONE HYDROCHLORIDE 5 MG/1
5 TABLET ORAL EVERY 4 HOURS PRN
Qty: 180 TABLET | Refills: 0 | Status: SHIPPED | OUTPATIENT
Start: 2021-07-16 | End: 2021-08-17 | Stop reason: SDUPTHER

## 2021-07-16 RX ORDER — FENTANYL 12.5 UG/1
1 PATCH TRANSDERMAL
Qty: 10 PATCH | Refills: 0 | Status: SHIPPED | OUTPATIENT
Start: 2021-07-16 | End: 2021-08-17 | Stop reason: SDUPTHER

## 2021-07-16 RX ORDER — FENTANYL 25 UG/1
1 PATCH TRANSDERMAL SEE ADMIN INSTRUCTIONS
Qty: 10 PATCH | Refills: 0 | Status: SHIPPED | OUTPATIENT
Start: 2021-07-16 | End: 2021-08-17 | Stop reason: SDUPTHER

## 2021-07-16 NOTE — TELEPHONE ENCOUNTER
Medicine Refill Request    Last Office Visit: 4/19/2021  Last Telemedicine Visit: 1/25/2021 Emily Sullivan MD    Next Office Visit: 7/19/2021  Next Telemedicine Visit: Visit date not found         Current Outpatient Medications:   •  ALPRAZolam (XANAX) 0.5 mg tablet, Take 2 tablets (1 mg total) by mouth 3 (three) times a day as needed for anxiety., Disp: 90 tablet, Rfl: 2  •  blood-glucose meter (PRECISION XTRA MONITOR) misc, 1 strip 2 (two) times a day., Disp: 1 each, Rfl: 0  •  escitalopram (LEXAPRO) 20 mg tablet, Take 1 tablet (20 mg total) by mouth daily., Disp: 30 tablet, Rfl: 1  •  fentaNYL (DURAGESIC) 12 mcg/hr, Place 1 patch on the skin every 3 (three) days. Use with 25 Mcg patch for total of 37 mcg., Disp: 10 patch, Rfl: 0  •  fentaNYL (DURAGESIC) 25 mcg/hr, Place 1 patch on the skin See admin instr. Apply 1 patch topically every 72 hours. Use with 12mcg, for 37 mcg total., Disp: 10 patch, Rfl: 0  •  gabapentin (NEURONTIN) 300 mg capsule, Take 1 capsule by mouth every 8 (eight) hours., Disp: , Rfl:   •  hydrochlorothiazide (HYDRODIURIL) 25 mg tablet, Take 1 tablet (25 mg total) by mouth once daily., Disp: 90 tablet, Rfl: 1  •  lancets (freestyle) 28 gauge misc, 1 strip 2 (two) times a day., Disp: 100 each, Rfl: 3  •  lansoprazole (PREVACID) 30 mg capsule, take 1 capsule orally once daily before breakfast, Disp: 30 capsule, Rfl: 2  •  metFORMIN (GLUCOPHAGE) 500 mg tablet, Take 1 tablet (500 mg total) by mouth 2 (two) times a day with meals., Disp: 180 tablet, Rfl: 1  •  metoprolol succinate XL (TOPROL-XL) 100 mg 24 hr tablet, Take 1 tablet (100 mg total) by mouth daily., Disp: 90 tablet, Rfl: 1  •  olmesartan (BENICAR) 40 mg tablet, take 1 tablet by mouth daily, Disp: 30 tablet, Rfl: 6  •  oxyCODONE (ROXICODONE) 5 mg immediate release tablet, Take 1 tablet (5 mg total) by mouth every 4 (four) hours as needed for moderate pain., Disp: 180 tablet, Rfl: 0  •  SITagliptin (JANUVIA) 100 mg tablet, Take 100  mg by mouth daily., Disp: , Rfl:       BP Readings from Last 3 Encounters:   04/19/21 120/74   08/27/20 140/80   01/29/20 126/78       Recent Lab results:  Lab Results   Component Value Date    CHOL 219 (H) 04/19/2021   ,   Lab Results   Component Value Date    HDL 44 04/19/2021   ,   Lab Results   Component Value Date    LDLCALC 126 (H) 04/19/2021   ,   Lab Results   Component Value Date    TRIG 278 (H) 04/19/2021        Lab Results   Component Value Date    GLUCOSE 397 (H) 04/19/2021   ,   Lab Results   Component Value Date    HGBA1C 8.2 (H) 04/19/2021         Lab Results   Component Value Date    CREATININE 0.68 04/19/2021       Lab Results   Component Value Date    TSH 4.890 (H) 12/30/2019

## 2021-07-16 NOTE — TELEPHONE ENCOUNTER
Patient contacted office to request prescription refill:    Name of medication:fentayyl  Strength of medication:12 mcg  SIG:every 3 days  Quantity:10  Requested number refills:0  Preferred Pharmacy:[facility]  Pharmacy number:[phone]    Patient contacted office to request prescription refill:    Name of medication:fentanyl  Strength of medication:25   SIG:every 3 days  Quantity:10  Requested number refills:0  Preferred Pharmacy:same  Pharmacy number:same    Patient contacted office to request prescription refill:    Name of medication:oxycodone  Strength of medication:5 mg  SIG:every 4 hrs  Quantity:180  Requested number refills:0  Preferred Pharmacy:  Pharmacy number:same    Patient states oxycodone was accidentially thrown out and is out of medication

## 2021-07-19 ENCOUNTER — OFFICE VISIT (OUTPATIENT)
Dept: FAMILY MEDICINE | Facility: CLINIC | Age: 56
End: 2021-07-19
Payer: COMMERCIAL

## 2021-07-19 VITALS
DIASTOLIC BLOOD PRESSURE: 78 MMHG | TEMPERATURE: 97.9 F | SYSTOLIC BLOOD PRESSURE: 130 MMHG | WEIGHT: 193 LBS | RESPIRATION RATE: 18 BRPM | BODY MASS INDEX: 31.02 KG/M2 | HEIGHT: 66 IN | HEART RATE: 60 BPM | OXYGEN SATURATION: 96 %

## 2021-07-19 DIAGNOSIS — G89.4 CHRONIC PAIN SYNDROME: Primary | ICD-10-CM

## 2021-07-19 DIAGNOSIS — E11.9 TYPE 2 DIABETES MELLITUS WITHOUT COMPLICATION, WITHOUT LONG-TERM CURRENT USE OF INSULIN (CMS/HCC): ICD-10-CM

## 2021-07-19 PROCEDURE — 3075F SYST BP GE 130 - 139MM HG: CPT | Performed by: INTERNAL MEDICINE

## 2021-07-19 PROCEDURE — 3008F BODY MASS INDEX DOCD: CPT | Performed by: INTERNAL MEDICINE

## 2021-07-19 PROCEDURE — 3078F DIAST BP <80 MM HG: CPT | Performed by: INTERNAL MEDICINE

## 2021-07-19 PROCEDURE — 99214 OFFICE O/P EST MOD 30 MIN: CPT | Performed by: INTERNAL MEDICINE

## 2021-07-19 RX ORDER — MELOXICAM 15 MG/1
TABLET ORAL
COMMUNITY
Start: 2021-05-30 | End: 2023-08-14

## 2021-07-19 RX ORDER — METFORMIN HYDROCHLORIDE 1000 MG/1
1000 TABLET ORAL 2 TIMES DAILY WITH MEALS
Qty: 180 TABLET | Refills: 1 | Status: SHIPPED | OUTPATIENT
Start: 2021-07-19 | End: 2022-11-07

## 2021-07-19 RX ORDER — LAMOTRIGINE 25 MG/1
TABLET ORAL
COMMUNITY
Start: 2021-07-09 | End: 2022-11-07

## 2021-07-19 ASSESSMENT — PATIENT HEALTH QUESTIONNAIRE - PHQ9: SUM OF ALL RESPONSES TO PHQ9 QUESTIONS 1 & 2: 6

## 2021-07-28 ASSESSMENT — ENCOUNTER SYMPTOMS
ENDOCRINE NEGATIVE: 1
BACK PAIN: 1
ALLERGIC/IMMUNOLOGIC NEGATIVE: 1
DYSPHORIC MOOD: 1
GASTROINTESTINAL NEGATIVE: 1
NERVOUS/ANXIOUS: 1
EYES NEGATIVE: 1
CONSTITUTIONAL NEGATIVE: 1
RESPIRATORY NEGATIVE: 1
NEUROLOGICAL NEGATIVE: 1
CARDIOVASCULAR NEGATIVE: 1
HEMATOLOGIC/LYMPHATIC NEGATIVE: 1

## 2021-07-29 NOTE — ASSESSMENT & PLAN NOTE
Patient with a history of diabetes. Her most recent hemoglobin A1c was 8.2.. Patient is on Metformin at 1000 mg twice daily.continue januvia 100 mg daily.  She is due for repeat labs in October. Patient was encouraged to watch refined carbohydrates. Adjustments to medication may be necessary.

## 2021-07-29 NOTE — PROGRESS NOTES
"  Subjective     Patient ID: Hilda Cornelius is a 55 y.o. female.    HPI here for checkup of chronic pain, diabetes    Review of Systems   Constitutional: Negative.    HENT: Negative.    Eyes: Negative.    Respiratory: Negative.    Cardiovascular: Negative.    Gastrointestinal: Negative.    Endocrine: Negative.    Genitourinary: Negative.    Musculoskeletal: Positive for back pain.   Skin: Negative.    Allergic/Immunologic: Negative.    Neurological: Negative.    Hematological: Negative.    Psychiatric/Behavioral: Positive for dysphoric mood. The patient is nervous/anxious.        Objective     Vitals:    07/19/21 1459   BP: 130/78   BP Location: Left upper arm   Patient Position: Sitting   Pulse: 60   Resp: 18   Temp: 36.6 °C (97.9 °F)   TempSrc: Temporal   SpO2: 96%   Weight: 87.5 kg (193 lb)   Height: 1.676 m (5' 6\")     Body mass index is 31.15 kg/m².    Physical Exam  Vitals and nursing note reviewed.   Constitutional:       Appearance: She is well-developed.   HENT:      Head: Normocephalic and atraumatic.      Right Ear: External ear normal.      Left Ear: External ear normal.      Nose: Nose normal.   Eyes:      Conjunctiva/sclera: Conjunctivae normal.      Pupils: Pupils are equal, round, and reactive to light.   Cardiovascular:      Rate and Rhythm: Regular rhythm. Tachycardia present.      Pulses: Normal pulses.      Heart sounds: Normal heart sounds. No murmur heard.   No gallop.    Pulmonary:      Effort: Pulmonary effort is normal.      Breath sounds: Normal breath sounds.   Abdominal:      General: Bowel sounds are normal.      Palpations: Abdomen is soft.   Musculoskeletal:         General: Normal range of motion.      Cervical back: Normal range of motion and neck supple.   Skin:     General: Skin is warm and dry.      Capillary Refill: Capillary refill takes less than 2 seconds.   Neurological:      General: No focal deficit present.      Mental Status: She is alert and oriented to person, place, " and time.      Cranial Nerves: No cranial nerve deficit.   Psychiatric:         Mood and Affect: Mood normal.         Behavior: Behavior normal.         Thought Content: Thought content normal.         Judgment: Judgment normal.         Assessment/Plan   Diagnoses and all orders for this visit:    Chronic pain syndrome (Primary)  Assessment & Plan:  Patient with chronic pain syndrome. Chronic pelvic and hip pain. Lumbar radiculopathy. Patient has has a escalating tolerance to her pain medication. She is presently on #180 of oxycodone 5 mg a month. She is also on fentanyl patches 37 mcg every 72 hours. Patient reports that her medication is managing her pain so she can take part in her activities of daily living. We will continue same regimen at this time      Type 2 diabetes mellitus without complication, without long-term current use of insulin (CMS/[facility])  Assessment & Plan:  Patient with a history of diabetes. Her most recent hemoglobin A1c was 8.2.. Patient is on Metformin at 1000 mg twice daily. She is due for repeat labs in October. Patient was encouraged to watch refined carbohydrates. Adjustments to medication may be necessary.      Other orders  -     metFORMIN (GLUCOPHAGE) 1,000 mg tablet; Take 1 tablet (1,000 mg total) by mouth 2 (two) times a day with meals. Increasing dose to 1000 mg. BID. Patient will use what she has, and call when she needs refills.

## 2021-07-29 NOTE — ASSESSMENT & PLAN NOTE
Patient with chronic pain syndrome. Chronic pelvic and hip pain. Lumbar radiculopathy. Patient has has a escalating tolerance to her pain medication. She is presently on #180 of oxycodone 5 mg a month. She is also on fentanyl patches 37 mcg every 72 hours. Patient reports that her medication is managing her pain so she can take part in some of her activities of daily living. She does have to pace herself. And take rests at intervals.  We will continue same regimen at this time

## 2021-08-17 ENCOUNTER — TELEPHONE (OUTPATIENT)
Dept: FAMILY MEDICINE | Facility: CLINIC | Age: 56
End: 2021-08-17

## 2021-08-17 DIAGNOSIS — M51.369 DEGENERATION OF LUMBAR INTERVERTEBRAL DISC: ICD-10-CM

## 2021-08-17 DIAGNOSIS — F41.9 ANXIETY: ICD-10-CM

## 2021-08-17 RX ORDER — FENTANYL 25 UG/1
1 PATCH TRANSDERMAL SEE ADMIN INSTRUCTIONS
Qty: 10 PATCH | Refills: 0 | Status: SHIPPED | OUTPATIENT
Start: 2021-08-17 | End: 2021-09-16 | Stop reason: SDUPTHER

## 2021-08-17 RX ORDER — ALPRAZOLAM 0.5 MG/1
1 TABLET ORAL 3 TIMES DAILY PRN
Qty: 90 TABLET | Refills: 2 | Status: SHIPPED | OUTPATIENT
Start: 2021-08-17 | End: 2021-11-29

## 2021-08-17 RX ORDER — FENTANYL 12.5 UG/1
1 PATCH TRANSDERMAL
Qty: 10 PATCH | Refills: 0 | Status: SHIPPED | OUTPATIENT
Start: 2021-08-17 | End: 2021-09-16 | Stop reason: SDUPTHER

## 2021-08-17 RX ORDER — OXYCODONE HYDROCHLORIDE 5 MG/1
5 TABLET ORAL EVERY 4 HOURS PRN
Qty: 180 TABLET | Refills: 0 | Status: SHIPPED | OUTPATIENT
Start: 2021-08-17 | End: 2021-09-16 | Stop reason: SDUPTHER

## 2021-08-17 NOTE — TELEPHONE ENCOUNTER
Patient contacted office to request prescription refill:    Name of medication:fentanyl  Strength of medication:12mcg  SI patch every 3 days  Quantity:10  Requested number refills:  Preferred Pharmacy:[facility]  Pharmacy number:[id number]    Patient contacted office to request prescription refill:    Name of medication:fentanyl  Strength of medication:25mcg  SI patch every 3 days  Quantity:10  Requested number refills:  Preferred Pharmacy:  Pharmacy number:    Patient contacted office to request prescription refill:    Name of medication:xanax  Strength of medication:0.5mg  SI tablets 3x daily  Quantity:90  Requested number refills:  Preferred Pharmacy:  Pharmacy number:    Patient contacted office to request prescription refill:    Name of medication:oxycoodne  Strength of medication:5mg  SI tablet every 4 hours  Quantity:180  Requested number refills:  Preferred Pharmacy:  Pharmacy number:

## 2021-08-17 NOTE — TELEPHONE ENCOUNTER
Medicine Refill Request    Last Office Visit: 7/19/2021  Last Telemedicine Visit: 1/25/2021 Emily Sullivan MD    Next Office Visit: 10/28/2021  Next Telemedicine Visit: Visit date not found         Current Outpatient Medications:   •  ALPRAZolam (XANAX) 0.5 mg tablet, Take 2 tablets (1 mg total) by mouth 3 (three) times a day as needed for anxiety., Disp: 90 tablet, Rfl: 2  •  blood-glucose meter (PRECISION XTRA MONITOR) misc, 1 strip 2 (two) times a day., Disp: 1 each, Rfl: 0  •  escitalopram (LEXAPRO) 20 mg tablet, Take 1 tablet (20 mg total) by mouth daily., Disp: 30 tablet, Rfl: 1  •  fentaNYL (DURAGESIC) 12 mcg/hr, Place 1 patch on the skin every 3 (three) days. Use with 25 Mcg patch for total of 37 mcg., Disp: 10 patch, Rfl: 0  •  fentaNYL (DURAGESIC) 25 mcg/hr, Place 1 patch on the skin See admin instr. Apply 1 patch topically every 72 hours. Use with 12mcg, for 37 mcg total., Disp: 10 patch, Rfl: 0  •  gabapentin (NEURONTIN) 300 mg capsule, Take 1 capsule by mouth every 8 (eight) hours., Disp: , Rfl:   •  hydrochlorothiazide (HYDRODIURIL) 25 mg tablet, Take 1 tablet (25 mg total) by mouth once daily., Disp: 90 tablet, Rfl: 1  •  lamoTRIgine (LaMICtal) 25 mg tablet, TAKE 1 TABLET AT BEDTIME FOR 2 WEEKS, THEN INCREASE TO 2 TABLETS AT BEDTIME, Disp: , Rfl:   •  lancets (freestyle) 28 gauge misc, 1 strip 2 (two) times a day., Disp: 100 each, Rfl: 3  •  lansoprazole (PREVACID) 30 mg capsule, take 1 capsule orally once daily before breakfast, Disp: 30 capsule, Rfl: 2  •  meloxicam (MOBIC) 15 mg tablet, TAKE 1 TABLET DAILY IF NEEDED WITH A FULL STOMACH, Disp: , Rfl:   •  metFORMIN (GLUCOPHAGE) 1,000 mg tablet, Take 1 tablet (1,000 mg total) by mouth 2 (two) times a day with meals. Increasing dose to 1000 mg. BID. Patient will use what she has, and call when she needs refills., Disp: 180 tablet, Rfl: 1  •  metoprolol succinate XL (TOPROL-XL) 100 mg 24 hr tablet, Take 1 tablet (100 mg total) by mouth daily.,  Disp: 90 tablet, Rfl: 1  •  olmesartan (BENICAR) 40 mg tablet, take 1 tablet by mouth daily, Disp: 30 tablet, Rfl: 6  •  oxyCODONE (ROXICODONE) 5 mg immediate release tablet, Take 1 tablet (5 mg total) by mouth every 4 (four) hours as needed for moderate pain., Disp: 180 tablet, Rfl: 0  •  SITagliptin (JANUVIA) 100 mg tablet, Take 100 mg by mouth daily., Disp: , Rfl:       BP Readings from Last 3 Encounters:   07/19/21 130/78   04/19/21 120/74   08/27/20 140/80       Recent Lab results:  Lab Results   Component Value Date    CHOL 219 (H) 04/19/2021   ,   Lab Results   Component Value Date    HDL 44 04/19/2021   ,   Lab Results   Component Value Date    LDLCALC 126 (H) 04/19/2021   ,   Lab Results   Component Value Date    TRIG 278 (H) 04/19/2021        Lab Results   Component Value Date    GLUCOSE 397 (H) 04/19/2021   ,   Lab Results   Component Value Date    HGBA1C 8.2 (H) 04/19/2021         Lab Results   Component Value Date    CREATININE 0.68 04/19/2021       Lab Results   Component Value Date    TSH 4.890 (H) 12/30/2019

## 2021-08-19 ENCOUNTER — TELEPHONE (OUTPATIENT)
Dept: FAMILY MEDICINE | Facility: CLINIC | Age: 56
End: 2021-08-19

## 2021-08-27 ENCOUNTER — TELEPHONE (OUTPATIENT)
Dept: FAMILY MEDICINE | Facility: CLINIC | Age: 56
End: 2021-08-27

## 2021-09-04 ENCOUNTER — TELEPHONE (OUTPATIENT)
Dept: FAMILY MEDICINE | Facility: CLINIC | Age: 56
End: 2021-09-04

## 2021-09-07 ENCOUNTER — TELEPHONE (OUTPATIENT)
Dept: FAMILY MEDICINE | Facility: CLINIC | Age: 56
End: 2021-09-07

## 2021-09-07 NOTE — TELEPHONE ENCOUNTER
Patient called and said that she has diabetes and also a boil on her inner thigh which is irritating. Patient would like to know if meds can be called into the pharmacy.     [phone]

## 2021-09-08 RX ORDER — CEFPROZIL 500 MG/1
500 TABLET, FILM COATED ORAL 2 TIMES DAILY
Qty: 20 TABLET | Refills: 0 | Status: SHIPPED | OUTPATIENT
Start: 2021-09-08 | End: 2021-09-18

## 2021-09-08 RX ORDER — CEFPROZIL 500 MG/1
500 TABLET, FILM COATED ORAL 2 TIMES DAILY
Qty: 20 TABLET | Refills: 0 | Status: SHIPPED | OUTPATIENT
Start: 2021-09-08 | End: 2021-09-08 | Stop reason: SDUPTHER

## 2021-09-09 NOTE — TELEPHONE ENCOUNTER
Patient with boil on medial thigh. Will treat with cefprozil.  [facility] is temporarily closed. Sent to [facility].

## 2021-09-16 DIAGNOSIS — M51.369 DEGENERATION OF LUMBAR INTERVERTEBRAL DISC: ICD-10-CM

## 2021-09-16 RX ORDER — FENTANYL 12.5 UG/1
1 PATCH TRANSDERMAL
Qty: 10 PATCH | Refills: 0 | Status: SHIPPED | OUTPATIENT
Start: 2021-09-16 | End: 2021-10-20 | Stop reason: SDUPTHER

## 2021-09-16 RX ORDER — FENTANYL 25 UG/1
1 PATCH TRANSDERMAL SEE ADMIN INSTRUCTIONS
Qty: 10 PATCH | Refills: 0 | Status: SHIPPED | OUTPATIENT
Start: 2021-09-16 | End: 2021-10-26 | Stop reason: SDUPTHER

## 2021-09-16 RX ORDER — OXYCODONE HYDROCHLORIDE 5 MG/1
5 TABLET ORAL EVERY 4 HOURS PRN
Qty: 180 TABLET | Refills: 0 | Status: SHIPPED | OUTPATIENT
Start: 2021-09-16 | End: 2021-10-14 | Stop reason: SDUPTHER

## 2021-09-16 NOTE — TELEPHONE ENCOUNTER
Medicine Refill Request    Last Office Visit: 7/19/2021  Last Telemedicine Visit: 1/25/2021 Emily Sullivan MD    Next Office Visit: 10/28/2021  Next Telemedicine Visit: Visit date not found         Current Outpatient Medications:   •  ALPRAZolam (XANAX) 0.5 mg tablet, Take 2 tablets (1 mg total) by mouth 3 (three) times a day as needed for anxiety., Disp: 90 tablet, Rfl: 2  •  blood-glucose meter (PRECISION XTRA MONITOR) misc, 1 strip 2 (two) times a day., Disp: 1 each, Rfl: 0  •  cefprosil (CEFZIL) 500 mg tablet, Take 1 tablet (500 mg total) by mouth 2 (two) times a day for 10 days., Disp: 20 tablet, Rfl: 0  •  escitalopram (LEXAPRO) 20 mg tablet, Take 1 tablet (20 mg total) by mouth daily., Disp: 30 tablet, Rfl: 1  •  fentaNYL (DURAGESIC) 12 mcg/hr, Place 1 patch on the skin every 3 (three) days. Use with 25 Mcg patch for total of 37 mcg., Disp: 10 patch, Rfl: 0  •  fentaNYL (DURAGESIC) 25 mcg/hr, Place 1 patch on the skin See admin instr. Apply 1 patch topically every 72 hours. Use with 12mcg, for 37 mcg total., Disp: 10 patch, Rfl: 0  •  gabapentin (NEURONTIN) 300 mg capsule, Take 1 capsule by mouth every 8 (eight) hours., Disp: , Rfl:   •  hydrochlorothiazide (HYDRODIURIL) 25 mg tablet, Take 1 tablet (25 mg total) by mouth once daily., Disp: 90 tablet, Rfl: 1  •  lamoTRIgine (LaMICtal) 25 mg tablet, TAKE 1 TABLET AT BEDTIME FOR 2 WEEKS, THEN INCREASE TO 2 TABLETS AT BEDTIME, Disp: , Rfl:   •  lancets (freestyle) 28 gauge misc, 1 strip 2 (two) times a day., Disp: 100 each, Rfl: 3  •  lansoprazole (PREVACID) 30 mg capsule, take 1 capsule orally once daily before breakfast, Disp: 30 capsule, Rfl: 2  •  meloxicam (MOBIC) 15 mg tablet, TAKE 1 TABLET DAILY IF NEEDED WITH A FULL STOMACH, Disp: , Rfl:   •  metFORMIN (GLUCOPHAGE) 1,000 mg tablet, Take 1 tablet (1,000 mg total) by mouth 2 (two) times a day with meals. Increasing dose to 1000 mg. BID. Patient will use what she has, and call when she needs refills.,  Disp: 180 tablet, Rfl: 1  •  metoprolol succinate XL (TOPROL-XL) 100 mg 24 hr tablet, Take 1 tablet (100 mg total) by mouth daily., Disp: 90 tablet, Rfl: 1  •  olmesartan (BENICAR) 40 mg tablet, take 1 tablet by mouth daily, Disp: 30 tablet, Rfl: 6  •  oxyCODONE (ROXICODONE) 5 mg immediate release tablet, Take 1 tablet (5 mg total) by mouth every 4 (four) hours as needed for moderate pain., Disp: 180 tablet, Rfl: 0  •  SITagliptin (JANUVIA) 100 mg tablet, Take 100 mg by mouth daily., Disp: , Rfl:       BP Readings from Last 3 Encounters:   07/19/21 130/78   04/19/21 120/74   08/27/20 140/80       Recent Lab results:  Lab Results   Component Value Date    CHOL 219 (H) 04/19/2021   ,   Lab Results   Component Value Date    HDL 44 04/19/2021   ,   Lab Results   Component Value Date    LDLCALC 126 (H) 04/19/2021   ,   Lab Results   Component Value Date    TRIG 278 (H) 04/19/2021        Lab Results   Component Value Date    GLUCOSE 397 (H) 04/19/2021   ,   Lab Results   Component Value Date    HGBA1C 8.2 (H) 04/19/2021         Lab Results   Component Value Date    CREATININE 0.68 04/19/2021       Lab Results   Component Value Date    TSH 4.890 (H) 12/30/2019

## 2021-09-16 NOTE — TELEPHONE ENCOUNTER
Patient contacted office to request prescription refill:    Name of medication:fentanyl  Strength of medication:12mcg  SI every 3 days  Quantity:`  Requested number refills:  Preferred Pharmacy:[facility]  Pharmacy number:[id number]    Patient contacted office to request prescription refill:    Name of medication:fentayl  Strength of medication:25mcg  SI every 3 days  Quantity:10  Requested number refills:  Preferred Pharmacy:  Pharmacy number:    Patient contacted office to request prescription refill:    Name of medication:oxycodone  Strength of medication:5mg  SI every 4 hours  Quantity:180  Requested number refills:  Preferred Pharmacy:  Pharmacy number:

## 2021-09-29 DIAGNOSIS — F41.9 ANXIETY: ICD-10-CM

## 2021-09-29 RX ORDER — ESCITALOPRAM OXALATE 20 MG/1
20 TABLET ORAL DAILY
Qty: 90 TABLET | Refills: 1 | Status: SHIPPED | OUTPATIENT
Start: 2021-09-29 | End: 2022-03-24

## 2021-09-29 RX ORDER — OLMESARTAN MEDOXOMIL 40 MG/1
40 TABLET ORAL DAILY
Qty: 90 TABLET | Refills: 1 | Status: SHIPPED | OUTPATIENT
Start: 2021-09-29 | End: 2022-03-24

## 2021-09-29 RX ORDER — METOPROLOL SUCCINATE 100 MG/1
100 TABLET, EXTENDED RELEASE ORAL DAILY
Qty: 90 TABLET | Refills: 1 | Status: SHIPPED | OUTPATIENT
Start: 2021-09-29 | End: 2022-03-24

## 2021-09-29 RX ORDER — HYDROCHLOROTHIAZIDE 25 MG/1
25 TABLET ORAL
Qty: 90 TABLET | Refills: 1 | Status: SHIPPED | OUTPATIENT
Start: 2021-09-29 | End: 2022-03-24

## 2021-09-30 ENCOUNTER — TELEPHONE (OUTPATIENT)
Dept: FAMILY MEDICINE | Facility: CLINIC | Age: 56
End: 2021-09-30

## 2021-10-14 DIAGNOSIS — M51.369 DEGENERATION OF LUMBAR INTERVERTEBRAL DISC: ICD-10-CM

## 2021-10-14 NOTE — TELEPHONE ENCOUNTER
Medicine Refill Request    Last Office Visit: 7/19/2021  Last Telemedicine Visit: 1/25/2021 Emily Sullivan MD    Next Office Visit: 10/28/2021  Next Telemedicine Visit: Visit date not found         Current Outpatient Medications:   •  ALPRAZolam (XANAX) 0.5 mg tablet, Take 2 tablets (1 mg total) by mouth 3 (three) times a day as needed for anxiety., Disp: 90 tablet, Rfl: 2  •  blood-glucose meter (PRECISION XTRA MONITOR) misc, 1 strip 2 (two) times a day., Disp: 1 each, Rfl: 0  •  escitalopram (LEXAPRO) 20 mg tablet, Take 1 tablet (20 mg total) by mouth daily., Disp: 90 tablet, Rfl: 1  •  fentaNYL (DURAGESIC) 12 mcg/hr, Place 1 patch on the skin every 3 (three) days. Use with 25 Mcg patch for total of 37 mcg., Disp: 10 patch, Rfl: 0  •  fentaNYL (DURAGESIC) 25 mcg/hr, Place 1 patch on the skin See admin instr. Apply 1 patch topically every 72 hours. Use with 12mcg, for 37 mcg total., Disp: 10 patch, Rfl: 0  •  gabapentin (NEURONTIN) 300 mg capsule, Take 1 capsule by mouth every 8 (eight) hours., Disp: , Rfl:   •  hydrochlorothiazide (HYDRODIURIL) 25 mg tablet, Take 1 tablet (25 mg total) by mouth once daily., Disp: 90 tablet, Rfl: 1  •  lamoTRIgine (LaMICtal) 25 mg tablet, TAKE 1 TABLET AT BEDTIME FOR 2 WEEKS, THEN INCREASE TO 2 TABLETS AT BEDTIME, Disp: , Rfl:   •  lancets (freestyle) 28 gauge misc, 1 strip 2 (two) times a day., Disp: 100 each, Rfl: 3  •  lansoprazole (PREVACID) 30 mg capsule, take 1 capsule orally once daily before breakfast, Disp: 30 capsule, Rfl: 2  •  meloxicam (MOBIC) 15 mg tablet, TAKE 1 TABLET DAILY IF NEEDED WITH A FULL STOMACH, Disp: , Rfl:   •  metFORMIN (GLUCOPHAGE) 1,000 mg tablet, Take 1 tablet (1,000 mg total) by mouth 2 (two) times a day with meals. Increasing dose to 1000 mg. BID. Patient will use what she has, and call when she needs refills., Disp: 180 tablet, Rfl: 1  •  metoprolol succinate XL (TOPROL-XL) 100 mg 24 hr tablet, Take 1 tablet (100 mg total) by mouth daily.,  Disp: 90 tablet, Rfl: 1  •  olmesartan (BENICAR) 40 mg tablet, Take 1 tablet (40 mg total) by mouth daily., Disp: 90 tablet, Rfl: 1  •  oxyCODONE (ROXICODONE) 5 mg immediate release tablet, Take 1 tablet (5 mg total) by mouth every 4 (four) hours as needed for moderate pain., Disp: 180 tablet, Rfl: 0  •  SITagliptin (JANUVIA) 100 mg tablet, Take 1 tablet (100 mg total) by mouth daily., Disp: 90 tablet, Rfl: 1      BP Readings from Last 3 Encounters:   07/19/21 130/78   04/19/21 120/74   08/27/20 140/80       Recent Lab results:  Lab Results   Component Value Date    CHOL 219 (H) 04/19/2021   ,   Lab Results   Component Value Date    HDL 44 04/19/2021   ,   Lab Results   Component Value Date    LDLCALC 126 (H) 04/19/2021   ,   Lab Results   Component Value Date    TRIG 278 (H) 04/19/2021        Lab Results   Component Value Date    GLUCOSE 397 (H) 04/19/2021   ,   Lab Results   Component Value Date    HGBA1C 8.2 (H) 04/19/2021         Lab Results   Component Value Date    CREATININE 0.68 04/19/2021       Lab Results   Component Value Date    TSH 4.890 (H) 12/30/2019

## 2021-10-15 RX ORDER — FENTANYL 12.5 UG/1
1 PATCH TRANSDERMAL
Qty: 10 PATCH | Refills: 0 | OUTPATIENT
Start: 2021-10-15 | End: 2021-11-14

## 2021-10-15 RX ORDER — FENTANYL 25 UG/1
1 PATCH TRANSDERMAL SEE ADMIN INSTRUCTIONS
Qty: 10 PATCH | Refills: 0 | OUTPATIENT
Start: 2021-10-15 | End: 2021-11-14

## 2021-10-15 RX ORDER — OXYCODONE HYDROCHLORIDE 5 MG/1
5 TABLET ORAL EVERY 4 HOURS PRN
Qty: 180 TABLET | Refills: 0 | Status: SHIPPED | OUTPATIENT
Start: 2021-10-15 | End: 2021-11-14

## 2021-10-15 NOTE — TELEPHONE ENCOUNTER
Chart and PDMP reviewed. Patient is due, will refill oxycodone today. Will refill Fentanyl. Dispensed 9/28 and 9/20.

## 2021-10-20 RX ORDER — FENTANYL 12.5 UG/1
1 PATCH TRANSDERMAL
Qty: 10 PATCH | Refills: 0 | Status: SHIPPED | OUTPATIENT
Start: 2021-10-20 | End: 2021-11-15 | Stop reason: SDUPTHER

## 2021-10-26 RX ORDER — FENTANYL 25 UG/1
1 PATCH TRANSDERMAL SEE ADMIN INSTRUCTIONS
Qty: 10 PATCH | Refills: 0 | Status: SHIPPED | OUTPATIENT
Start: 2021-10-27 | End: 2021-12-16 | Stop reason: SDUPTHER

## 2021-10-27 ENCOUNTER — TELEPHONE (OUTPATIENT)
Dept: FAMILY MEDICINE | Facility: CLINIC | Age: 56
End: 2021-10-27

## 2021-10-27 DIAGNOSIS — Z12.31 ENCOUNTER FOR SCREENING MAMMOGRAM FOR MALIGNANT NEOPLASM OF BREAST: Primary | ICD-10-CM

## 2021-10-27 NOTE — TELEPHONE ENCOUNTER
[facility] Outreach: pt due for mammo, pap and CAYLA. Pt agreed to mammo and will f/u with GYN and Ophthalmology.

## 2021-10-28 ENCOUNTER — OFFICE VISIT (OUTPATIENT)
Dept: FAMILY MEDICINE | Facility: CLINIC | Age: 56
End: 2021-10-28
Payer: COMMERCIAL

## 2021-10-28 VITALS
RESPIRATION RATE: 16 BRPM | WEIGHT: 188 LBS | HEIGHT: 66 IN | SYSTOLIC BLOOD PRESSURE: 130 MMHG | DIASTOLIC BLOOD PRESSURE: 72 MMHG | OXYGEN SATURATION: 97 % | BODY MASS INDEX: 30.22 KG/M2 | HEART RATE: 77 BPM | TEMPERATURE: 96.9 F

## 2021-10-28 DIAGNOSIS — E11.9 TYPE 2 DIABETES MELLITUS WITHOUT COMPLICATION, WITHOUT LONG-TERM CURRENT USE OF INSULIN (CMS/HCC): Primary | ICD-10-CM

## 2021-10-28 DIAGNOSIS — G89.4 CHRONIC PAIN SYNDROME: ICD-10-CM

## 2021-10-28 PROBLEM — N39.0 URINARY TRACT INFECTIOUS DISEASE: Status: ACTIVE | Noted: 2021-03-05

## 2021-10-28 PROBLEM — R31.0 FRANK HEMATURIA: Status: ACTIVE | Noted: 2021-03-05

## 2021-10-28 PROCEDURE — 3008F BODY MASS INDEX DOCD: CPT | Performed by: INTERNAL MEDICINE

## 2021-10-28 PROCEDURE — 3078F DIAST BP <80 MM HG: CPT | Performed by: INTERNAL MEDICINE

## 2021-10-28 PROCEDURE — 3075F SYST BP GE 130 - 139MM HG: CPT | Performed by: INTERNAL MEDICINE

## 2021-10-28 PROCEDURE — 99214 OFFICE O/P EST MOD 30 MIN: CPT | Mod: 25 | Performed by: INTERNAL MEDICINE

## 2021-10-28 PROCEDURE — 90471 IMMUNIZATION ADMIN: CPT | Performed by: INTERNAL MEDICINE

## 2021-10-28 PROCEDURE — 90686 IIV4 VACC NO PRSV 0.5 ML IM: CPT | Performed by: INTERNAL MEDICINE

## 2021-10-28 RX ORDER — LANSOPRAZOLE 30 MG/1
30 CAPSULE, DELAYED RELEASE ORAL
Qty: 90 CAPSULE | Refills: 1 | Status: SHIPPED | OUTPATIENT
Start: 2021-10-28 | End: 2022-04-07

## 2021-10-28 RX ORDER — GABAPENTIN 100 MG/1
100 CAPSULE ORAL NIGHTLY
Qty: 90 CAPSULE | Refills: 1 | Status: SHIPPED | OUTPATIENT
Start: 2021-10-28 | End: 2022-11-07

## 2021-10-28 ASSESSMENT — PATIENT HEALTH QUESTIONNAIRE - PHQ9: SUM OF ALL RESPONSES TO PHQ9 QUESTIONS 1 & 2: 0

## 2021-10-28 NOTE — PROGRESS NOTES
"  Subjective     Patient ID: Hilda Cornelius is a 56 y.o. female.    HPI patient is here for evaluation of chronic pain, diabetes, anxiety and depression.  She will also get her flu vaccine    Review of Systems   Constitutional: Negative.    HENT: Negative.    Eyes: Negative.    Respiratory: Negative.    Cardiovascular: Negative.    Gastrointestinal: Negative.    Endocrine: Negative.    Genitourinary: Negative.    Musculoskeletal: Positive for arthralgias and back pain.   Skin: Negative.    Allergic/Immunologic: Negative.    Neurological: Negative.    Hematological: Negative.    Psychiatric/Behavioral: Negative.        Objective     Vitals:    10/28/21 1815   BP: 130/72   BP Location: Left upper arm   Patient Position: Sitting   Pulse: 77   Resp: 16   Temp: (!) 36.1 °C (96.9 °F)   TempSrc: Temporal   SpO2: 97%   Weight: 85.3 kg (188 lb)   Height: 1.676 m (5' 6\")     Body mass index is 30.34 kg/m².    Physical Exam  Vitals and nursing note reviewed.   Constitutional:       Appearance: She is well-developed.   HENT:      Head: Normocephalic and atraumatic.      Right Ear: External ear normal.      Left Ear: External ear normal.      Nose: Nose normal.   Eyes:      Conjunctiva/sclera: Conjunctivae normal.      Pupils: Pupils are equal, round, and reactive to light.   Cardiovascular:      Rate and Rhythm: Normal rate and regular rhythm.      Pulses: Normal pulses.      Heart sounds: Normal heart sounds. No murmur heard.  No gallop.    Pulmonary:      Effort: Pulmonary effort is normal.      Breath sounds: Normal breath sounds.   Abdominal:      General: Bowel sounds are normal.      Palpations: Abdomen is soft.   Musculoskeletal:         General: Normal range of motion.      Cervical back: Normal range of motion and neck supple.   Skin:     General: Skin is warm and dry.      Capillary Refill: Capillary refill takes less than 2 seconds.   Neurological:      General: No focal deficit present.      Mental Status: She is " alert and oriented to person, place, and time.      Cranial Nerves: No cranial nerve deficit.   Psychiatric:         Mood and Affect: Mood normal.         Behavior: Behavior normal.         Thought Content: Thought content normal.         Judgment: Judgment normal.         Assessment/Plan   Diagnoses and all orders for this visit:    Type 2 diabetes mellitus without complication, without long-term current use of insulin (CMS/[facility]) (Primary)  Assessment & Plan:  Patient reports she had been having difficulty with her diabetic meds.  She is back to taking her Metformin 1000 mg twice a day and her Januvia 100 mg.  Will order labs to be sent to patient to have done..  Prior to her appointment in January.    Orders:  -     DIABETIC RETINOPATHY IMAGING; Future    Chronic pain syndrome  Assessment & Plan:  Patient with a history of chronic pain syndrome.  Including chronic pelvic, hip pain and lumbar radiculopathy.  Patient has been on 37 mcg of fentanyl every 72 hours.  Via patch and she is also on 6 oxycodone 5 mg a day..  Discussions have been had about trying to wean her down.  Patient does not believe she be able to function.  She understands we will not escalate the dose further.  She is able to be referred to pain management if she would be willing.  She declines at this time      Other orders  -     lansoprazole 30 mg capsule; Take 1 capsule (30 mg total) by mouth daily before breakfast.  -     gabapentin (NEURONTIN) 100 mg capsule; Take 1 capsule (100 mg total) by mouth nightly.  -     Influenza vaccine quadrivalent preservative free 6 mon and older IM (FluLaval)

## 2021-11-03 ASSESSMENT — ENCOUNTER SYMPTOMS
ALLERGIC/IMMUNOLOGIC NEGATIVE: 1
PSYCHIATRIC NEGATIVE: 1
RESPIRATORY NEGATIVE: 1
CARDIOVASCULAR NEGATIVE: 1
HEMATOLOGIC/LYMPHATIC NEGATIVE: 1
GASTROINTESTINAL NEGATIVE: 1
ARTHRALGIAS: 1
EYES NEGATIVE: 1
NEUROLOGICAL NEGATIVE: 1
CONSTITUTIONAL NEGATIVE: 1
BACK PAIN: 1
ENDOCRINE NEGATIVE: 1

## 2021-11-03 NOTE — ASSESSMENT & PLAN NOTE
Patient reports she had been having difficulty with her diabetic meds.  She is back to taking her Metformin 1000 mg twice a day and her Januvia 100 mg.  Will order labs to be sent to patient to have done..  Prior to her appointment in January.

## 2021-11-03 NOTE — ASSESSMENT & PLAN NOTE
Patient with a history of chronic pain syndrome.  Including chronic pelvic, hip pain and lumbar radiculopathy.  Patient has been on 37 mcg of fentanyl every 72 hours.  Via patch and she is also on 6 oxycodone 5 mg a day..  Discussions have been had about trying to wean her down.  Patient does not believe she be able to function.  She understands we will not escalate the dose further.  She is able to be referred to pain management if she would be willing.  She declines at this time

## 2021-11-03 NOTE — ASSESSMENT & PLAN NOTE
Patient is presently on lamotrigine 25 mg daily, Lexapro 20 mg, alprazolam 0.5 mg up to 3 times a day.  Gabapentin 100 mg at at bedtime for her anxiety and major depressive episode.  She appears to be doing better.  But does still struggle with panic attacks at times.  The alprazolam does help this.

## 2021-11-15 DIAGNOSIS — M51.369 DEGENERATION OF LUMBAR INTERVERTEBRAL DISC: ICD-10-CM

## 2021-11-15 RX ORDER — FENTANYL 12.5 UG/1
1 PATCH TRANSDERMAL
Qty: 10 PATCH | Refills: 0 | Status: SHIPPED | OUTPATIENT
Start: 2021-11-15 | End: 2021-12-16 | Stop reason: SDUPTHER

## 2021-11-15 RX ORDER — OXYCODONE HYDROCHLORIDE 5 MG/1
5 TABLET ORAL EVERY 4 HOURS PRN
Qty: 180 TABLET | Refills: 0 | Status: SHIPPED | OUTPATIENT
Start: 2021-11-15 | End: 2021-12-16 | Stop reason: SDUPTHER

## 2021-11-15 NOTE — TELEPHONE ENCOUNTER
Patient contacted office to request prescription refill:    Name of medication:oxycodone  Strength of medication:5 mg  SIG: every 4 hrs  Quantity:180  Requested number refills:0  Preferred Pharmacy: [facility]  Pharmacy number:[phone]    Patient contacted office to request prescription refill:    Name of medication:alprazolam  Strength of medication:0.5  SIG:TID  Quantity:90  Requested number refills:2  Preferred Pharmacy:same  Pharmacy number:same    Patient contacted office to request prescription refill:    Name of medication:fentanyl  Strength of medication:12   SIG:every 3 days  Quantity:10  Requested number refills:  Preferred Pharmacy:same  Pharmacy number:    Patient contacted office to request prescription refill:    Name of medication:fentanyl  Strength of medication:25  SIG: every 3 days  Quantity:10  Requested number refills:  Preferred Pharmacy:same  Pharmacy number:

## 2021-11-15 NOTE — TELEPHONE ENCOUNTER
Medicine Refill Request    Last Office Visit: 10/28/2021  Last Telemedicine Visit: 1/25/2021 Emily Sullivan MD    Next Office Visit: 1/27/2022  Next Telemedicine Visit: Visit date not found         Current Outpatient Medications:   •  ALPRAZolam (XANAX) 0.5 mg tablet, Take 2 tablets (1 mg total) by mouth 3 (three) times a day as needed for anxiety., Disp: 90 tablet, Rfl: 2  •  blood-glucose meter (PRECISION XTRA MONITOR) misc, 1 strip 2 (two) times a day., Disp: 1 each, Rfl: 0  •  escitalopram (LEXAPRO) 20 mg tablet, Take 1 tablet (20 mg total) by mouth daily., Disp: 90 tablet, Rfl: 1  •  fentaNYL 12 mcg/hr, Place 1 patch on the skin every 3 (three) days. Use with 25 Mcg patch for total of 37 mcg., Disp: 10 patch, Rfl: 0  •  fentaNYL 25 mcg/hr, Place 1 patch on the skin See admin instr. Apply 1 patch topically every 72 hours. Use with 12mcg, for 37 mcg total., Disp: 10 patch, Rfl: 0  •  gabapentin (NEURONTIN) 100 mg capsule, Take 1 capsule (100 mg total) by mouth nightly., Disp: 90 capsule, Rfl: 1  •  hydrochlorothiazide (HYDRODIURIL) 25 mg tablet, Take 1 tablet (25 mg total) by mouth once daily., Disp: 90 tablet, Rfl: 1  •  lamoTRIgine (LaMICtal) 25 mg tablet, TAKE 1 TABLET AT BEDTIME FOR 2 WEEKS, THEN INCREASE TO 2 TABLETS AT BEDTIME, Disp: , Rfl:   •  lancets (freestyle) 28 gauge misc, 1 strip 2 (two) times a day., Disp: 100 each, Rfl: 3  •  lansoprazole 30 mg capsule, Take 1 capsule (30 mg total) by mouth daily before breakfast., Disp: 90 capsule, Rfl: 1  •  meloxicam (MOBIC) 15 mg tablet, TAKE 1 TABLET DAILY IF NEEDED WITH A FULL STOMACH, Disp: , Rfl:   •  metFORMIN (GLUCOPHAGE) 1,000 mg tablet, Take 1 tablet (1,000 mg total) by mouth 2 (two) times a day with meals. Increasing dose to 1000 mg. BID. Patient will use what she has, and call when she needs refills., Disp: 180 tablet, Rfl: 1  •  metoprolol succinate XL (TOPROL-XL) 100 mg 24 hr tablet, Take 1 tablet (100 mg total) by mouth daily., Disp: 90  tablet, Rfl: 1  •  olmesartan (BENICAR) 40 mg tablet, Take 1 tablet (40 mg total) by mouth daily., Disp: 90 tablet, Rfl: 1  •  SITagliptin (JANUVIA) 100 mg tablet, Take 1 tablet (100 mg total) by mouth daily., Disp: 90 tablet, Rfl: 1      BP Readings from Last 3 Encounters:   10/28/21 130/72   07/19/21 130/78   04/19/21 120/74       Recent Lab results:  Lab Results   Component Value Date    CHOL 219 (H) 04/19/2021   ,   Lab Results   Component Value Date    HDL 44 04/19/2021   ,   Lab Results   Component Value Date    LDLCALC 126 (H) 04/19/2021   ,   Lab Results   Component Value Date    TRIG 278 (H) 04/19/2021        Lab Results   Component Value Date    GLUCOSE 397 (H) 04/19/2021   ,   Lab Results   Component Value Date    HGBA1C 8.2 (H) 04/19/2021         Lab Results   Component Value Date    CREATININE 0.68 04/19/2021       Lab Results   Component Value Date    TSH 4.890 (H) 12/30/2019

## 2021-11-29 DIAGNOSIS — F41.9 ANXIETY: ICD-10-CM

## 2021-11-29 RX ORDER — ALPRAZOLAM 0.5 MG/1
1 TABLET ORAL 3 TIMES DAILY PRN
Qty: 90 TABLET | Refills: 1 | Status: SHIPPED | OUTPATIENT
Start: 2021-11-29 | End: 2022-02-03

## 2021-11-29 NOTE — TELEPHONE ENCOUNTER
Medicine Refill Request    Last Office Visit: 10/28/2021  Last Telemedicine Visit: 1/25/2021 Emily Sullivan MD    Next Office Visit: 1/27/2022  Next Telemedicine Visit: Visit date not found         Current Outpatient Medications:   •  ALPRAZolam (XANAX) 0.5 mg tablet, Take 2 tablets (1 mg total) by mouth 3 (three) times a day as needed for anxiety., Disp: 90 tablet, Rfl: 2  •  blood-glucose meter (PRECISION XTRA MONITOR) misc, 1 strip 2 (two) times a day., Disp: 1 each, Rfl: 0  •  escitalopram (LEXAPRO) 20 mg tablet, Take 1 tablet (20 mg total) by mouth daily., Disp: 90 tablet, Rfl: 1  •  fentaNYL 12 mcg/hr, Place 1 patch on the skin every 3 (three) days. Use with 25 Mcg patch for total of 37 mcg., Disp: 10 patch, Rfl: 0  •  fentaNYL 25 mcg/hr, Place 1 patch on the skin See admin instr. Apply 1 patch topically every 72 hours. Use with 12mcg, for 37 mcg total., Disp: 10 patch, Rfl: 0  •  gabapentin (NEURONTIN) 100 mg capsule, Take 1 capsule (100 mg total) by mouth nightly., Disp: 90 capsule, Rfl: 1  •  hydrochlorothiazide (HYDRODIURIL) 25 mg tablet, Take 1 tablet (25 mg total) by mouth once daily., Disp: 90 tablet, Rfl: 1  •  lamoTRIgine (LaMICtal) 25 mg tablet, TAKE 1 TABLET AT BEDTIME FOR 2 WEEKS, THEN INCREASE TO 2 TABLETS AT BEDTIME, Disp: , Rfl:   •  lancets (freestyle) 28 gauge misc, 1 strip 2 (two) times a day., Disp: 100 each, Rfl: 3  •  lansoprazole 30 mg capsule, Take 1 capsule (30 mg total) by mouth daily before breakfast., Disp: 90 capsule, Rfl: 1  •  meloxicam (MOBIC) 15 mg tablet, TAKE 1 TABLET DAILY IF NEEDED WITH A FULL STOMACH, Disp: , Rfl:   •  metFORMIN (GLUCOPHAGE) 1,000 mg tablet, Take 1 tablet (1,000 mg total) by mouth 2 (two) times a day with meals. Increasing dose to 1000 mg. BID. Patient will use what she has, and call when she needs refills., Disp: 180 tablet, Rfl: 1  •  metoprolol succinate XL (TOPROL-XL) 100 mg 24 hr tablet, Take 1 tablet (100 mg total) by mouth daily., Disp: 90  tablet, Rfl: 1  •  olmesartan (BENICAR) 40 mg tablet, Take 1 tablet (40 mg total) by mouth daily., Disp: 90 tablet, Rfl: 1  •  oxyCODONE (ROXICODONE) 5 mg immediate release tablet, Take 1 tablet (5 mg total) by mouth every 4 (four) hours as needed for moderate pain., Disp: 180 tablet, Rfl: 0  •  SITagliptin (JANUVIA) 100 mg tablet, Take 1 tablet (100 mg total) by mouth daily., Disp: 90 tablet, Rfl: 1      BP Readings from Last 3 Encounters:   10/28/21 130/72   07/19/21 130/78   04/19/21 120/74       Recent Lab results:  Lab Results   Component Value Date    CHOL 219 (H) 04/19/2021   ,   Lab Results   Component Value Date    HDL 44 04/19/2021   ,   Lab Results   Component Value Date    LDLCALC 126 (H) 04/19/2021   ,   Lab Results   Component Value Date    TRIG 278 (H) 04/19/2021        Lab Results   Component Value Date    GLUCOSE 397 (H) 04/19/2021   ,   Lab Results   Component Value Date    HGBA1C 8.2 (H) 04/19/2021         Lab Results   Component Value Date    CREATININE 0.68 04/19/2021       Lab Results   Component Value Date    TSH 4.890 (H) 12/30/2019

## 2021-12-07 ENCOUNTER — TELEPHONE (OUTPATIENT)
Dept: FAMILY MEDICINE | Facility: CLINIC | Age: 56
End: 2021-12-07
Payer: COMMERCIAL

## 2021-12-07 NOTE — TELEPHONE ENCOUNTER
[facility] Outreach: pt due for mammo, pap and CAYLA. CAYLA ordered 10/28 but Ophthalmology, pt agreed to mammo, ordered 10/27, will resend. Sending pt [facility] message to obtain both imagings.

## 2021-12-16 DIAGNOSIS — M51.369 DEGENERATION OF LUMBAR INTERVERTEBRAL DISC: ICD-10-CM

## 2021-12-16 RX ORDER — OXYCODONE HYDROCHLORIDE 5 MG/1
5 TABLET ORAL EVERY 4 HOURS PRN
Qty: 180 TABLET | Refills: 0 | Status: SHIPPED | OUTPATIENT
Start: 2021-12-16 | End: 2022-01-17 | Stop reason: SDUPTHER

## 2021-12-16 RX ORDER — FENTANYL 25 UG/1
1 PATCH TRANSDERMAL SEE ADMIN INSTRUCTIONS
Qty: 10 PATCH | Refills: 0 | Status: SHIPPED | OUTPATIENT
Start: 2021-12-16 | End: 2022-01-17 | Stop reason: SDUPTHER

## 2021-12-16 RX ORDER — FENTANYL 12.5 UG/1
1 PATCH TRANSDERMAL
Qty: 10 PATCH | Refills: 0 | Status: SHIPPED | OUTPATIENT
Start: 2021-12-16 | End: 2022-02-24 | Stop reason: SDUPTHER

## 2021-12-16 NOTE — TELEPHONE ENCOUNTER
Medicine Refill Request    Last Office: 10/28/2021   Last Consult Visit: Visit date not found  Last Telemedicine Visit: 1/25/2021 Emily Sullivan MD    Next Appointment: 1/27/2022      Current Outpatient Medications:   •  ALPRAZolam 0.5 mg tablet, Take 2 tablets (1 mg total) by mouth 3 (three) times a day as needed for anxiety., Disp: 90 tablet, Rfl: 1  •  blood-glucose meter (PRECISION XTRA MONITOR) misc, 1 strip 2 (two) times a day., Disp: 1 each, Rfl: 0  •  escitalopram (LEXAPRO) 20 mg tablet, Take 1 tablet (20 mg total) by mouth daily., Disp: 90 tablet, Rfl: 1  •  fentaNYL 12 mcg/hr, Place 1 patch on the skin every 3 (three) days. Use with 25 Mcg patch for total of 37 mcg., Disp: 10 patch, Rfl: 0  •  fentaNYL 25 mcg/hr, Place 1 patch on the skin See admin instr. Apply 1 patch topically every 72 hours. Use with 12mcg, for 37 mcg total., Disp: 10 patch, Rfl: 0  •  gabapentin (NEURONTIN) 100 mg capsule, Take 1 capsule (100 mg total) by mouth nightly., Disp: 90 capsule, Rfl: 1  •  hydrochlorothiazide (HYDRODIURIL) 25 mg tablet, Take 1 tablet (25 mg total) by mouth once daily., Disp: 90 tablet, Rfl: 1  •  lamoTRIgine (LaMICtal) 25 mg tablet, TAKE 1 TABLET AT BEDTIME FOR 2 WEEKS, THEN INCREASE TO 2 TABLETS AT BEDTIME, Disp: , Rfl:   •  lancets (freestyle) 28 gauge misc, 1 strip 2 (two) times a day., Disp: 100 each, Rfl: 3  •  lansoprazole 30 mg capsule, Take 1 capsule (30 mg total) by mouth daily before breakfast., Disp: 90 capsule, Rfl: 1  •  meloxicam (MOBIC) 15 mg tablet, TAKE 1 TABLET DAILY IF NEEDED WITH A FULL STOMACH, Disp: , Rfl:   •  metFORMIN (GLUCOPHAGE) 1,000 mg tablet, Take 1 tablet (1,000 mg total) by mouth 2 (two) times a day with meals. Increasing dose to 1000 mg. BID. Patient will use what she has, and call when she needs refills., Disp: 180 tablet, Rfl: 1  •  metoprolol succinate XL (TOPROL-XL) 100 mg 24 hr tablet, Take 1 tablet (100 mg total) by mouth daily., Disp: 90 tablet, Rfl: 1  •   olmesartan (BENICAR) 40 mg tablet, Take 1 tablet (40 mg total) by mouth daily., Disp: 90 tablet, Rfl: 1  •  oxyCODONE (ROXICODONE) 5 mg immediate release tablet, Take 1 tablet (5 mg total) by mouth every 4 (four) hours as needed for moderate pain., Disp: 180 tablet, Rfl: 0  •  SITagliptin (JANUVIA) 100 mg tablet, Take 1 tablet (100 mg total) by mouth daily., Disp: 90 tablet, Rfl: 1      BP Readings from Last 3 Encounters:   10/28/21 130/72   07/19/21 130/78   04/19/21 120/74       Recent Lab results:  Lab Results   Component Value Date    CHOL 219 (H) 04/19/2021   ,   Lab Results   Component Value Date    HDL 44 04/19/2021   ,   Lab Results   Component Value Date    LDLCALC 126 (H) 04/19/2021   ,   Lab Results   Component Value Date    TRIG 278 (H) 04/19/2021        Lab Results   Component Value Date    GLUCOSE 397 (H) 04/19/2021   ,   Lab Results   Component Value Date    HGBA1C 8.2 (H) 04/19/2021         Lab Results   Component Value Date    CREATININE 0.68 04/19/2021       Lab Results   Component Value Date    TSH 4.890 (H) 12/30/2019

## 2021-12-16 NOTE — TELEPHONE ENCOUNTER
Patient contacted office to request prescription refill:    Name of medication:fentanyl  Strength of medication:12  SIG:every 3 days  Quantity 10  Requested number refills:0   Preferred Pharmacy:[facility]   Pharmacy number:[phone]    Patient contacted office to request prescription refill:    Name of medication:fentanyl   Strength of medication:25  SIG:every 3 days  Quantity:10  Requested number refills:0  Preferred Pharmacy:same  Pharmacy number:same    Patient contacted office to request prescription refill:    Name of medication:oxycodone  Strength of medication:5 mg  SIG:every 4 hrs  Quantity:180  Requested number refills:0  Preferred Pharmacy:same  Pharmacy number:

## 2022-01-17 DIAGNOSIS — M51.369 DEGENERATION OF LUMBAR INTERVERTEBRAL DISC: ICD-10-CM

## 2022-01-17 RX ORDER — OXYCODONE HYDROCHLORIDE 5 MG/1
5 TABLET ORAL EVERY 4 HOURS PRN
Qty: 180 TABLET | Refills: 0 | Status: SHIPPED | OUTPATIENT
Start: 2022-01-17 | End: 2022-02-16 | Stop reason: SDUPTHER

## 2022-01-17 RX ORDER — FENTANYL 12.5 UG/1
1 PATCH TRANSDERMAL
Qty: 10 PATCH | Refills: 0 | OUTPATIENT
Start: 2022-01-17 | End: 2022-02-16

## 2022-01-17 RX ORDER — FENTANYL 25 UG/1
1 PATCH TRANSDERMAL SEE ADMIN INSTRUCTIONS
Qty: 10 PATCH | Refills: 0 | Status: SHIPPED | OUTPATIENT
Start: 2022-01-17 | End: 2022-02-16 | Stop reason: SDUPTHER

## 2022-01-17 NOTE — TELEPHONE ENCOUNTER
PDMP reviewed. Refills sent for the 25mg fentanyl patch and oxy. Refill was filled for the 12mg fentanyl patch on 1/7, it is too soon.

## 2022-01-17 NOTE — TELEPHONE ENCOUNTER
Patient contacted office to request prescription refill:    Name of medication: fentanyl  Strength of medication: 12 mcg  SIG: place 1 patch on skin  Quantity:10 patch  Requested number refills:1  Preferred Pharmacy: [facility]/Pharmacy #[id number] - latosha alba - [address]  Pharmacy number:[phone]    Patient contacted office to request prescription refill:    Name of medication:fentanyl   Strength of medication:25mcg  SIG:place 1 patch  Quantity:10  Requested number refills:1  Preferred Pharmacy:same  Pharmacy number:same    Patient contacted office to request prescription refill:    Name of medication:oxycodone  Strength of medication:5mg  SIG: take 1 tablet by mouth every 4hrs  Quantity:180  Requested number refills:1  Preferred Pharmacy: same  Pharmacy number:same

## 2022-01-27 ENCOUNTER — OFFICE VISIT (OUTPATIENT)
Dept: FAMILY MEDICINE | Facility: CLINIC | Age: 57
End: 2022-01-27
Payer: COMMERCIAL

## 2022-01-27 VITALS
HEIGHT: 66 IN | OXYGEN SATURATION: 97 % | HEART RATE: 58 BPM | TEMPERATURE: 97.3 F | DIASTOLIC BLOOD PRESSURE: 82 MMHG | WEIGHT: 182 LBS | BODY MASS INDEX: 29.25 KG/M2 | RESPIRATION RATE: 16 BRPM | SYSTOLIC BLOOD PRESSURE: 140 MMHG

## 2022-01-27 DIAGNOSIS — J98.01 BRONCHOSPASM: ICD-10-CM

## 2022-01-27 DIAGNOSIS — F41.9 ANXIETY: ICD-10-CM

## 2022-01-27 DIAGNOSIS — G89.4 CHRONIC PAIN SYNDROME: Primary | ICD-10-CM

## 2022-01-27 DIAGNOSIS — I10 HYPERTENSION, ESSENTIAL: ICD-10-CM

## 2022-01-27 DIAGNOSIS — E11.9 TYPE 2 DIABETES MELLITUS WITHOUT COMPLICATION, WITHOUT LONG-TERM CURRENT USE OF INSULIN (CMS/HCC): ICD-10-CM

## 2022-01-27 PROCEDURE — 3079F DIAST BP 80-89 MM HG: CPT | Performed by: INTERNAL MEDICINE

## 2022-01-27 PROCEDURE — 99214 OFFICE O/P EST MOD 30 MIN: CPT | Performed by: INTERNAL MEDICINE

## 2022-01-27 PROCEDURE — 3008F BODY MASS INDEX DOCD: CPT | Performed by: INTERNAL MEDICINE

## 2022-01-27 PROCEDURE — 3077F SYST BP >= 140 MM HG: CPT | Performed by: INTERNAL MEDICINE

## 2022-01-27 RX ORDER — ALBUTEROL SULFATE 90 UG/1
2 INHALANT RESPIRATORY (INHALATION) EVERY 6 HOURS PRN
Qty: 18 G | Refills: 1 | Status: SHIPPED | OUTPATIENT
Start: 2022-01-27 | End: 2022-02-24

## 2022-01-27 ASSESSMENT — PATIENT HEALTH QUESTIONNAIRE - PHQ9: SUM OF ALL RESPONSES TO PHQ9 QUESTIONS 1 & 2: 0

## 2022-01-30 ASSESSMENT — ENCOUNTER SYMPTOMS
SHORTNESS OF BREATH: 0
ALLERGIC/IMMUNOLOGIC NEGATIVE: 1
RESPIRATORY NEGATIVE: 1
NEUROLOGICAL NEGATIVE: 1
CARDIOVASCULAR NEGATIVE: 1
EYES NEGATIVE: 1
COUGH: 0
ARTHRALGIAS: 1
BACK PAIN: 1
MYALGIAS: 1
ENDOCRINE NEGATIVE: 1
GASTROINTESTINAL NEGATIVE: 1
CONSTITUTIONAL NEGATIVE: 1
HEMATOLOGIC/LYMPHATIC NEGATIVE: 1
PSYCHIATRIC NEGATIVE: 1

## 2022-01-30 NOTE — ASSESSMENT & PLAN NOTE
Patient with a history of chronic pain syndrome.  She is on 37 mcg of fentanyl every 72 hours.  And she also takes six oxycodones a day.  Patient declines referral to pain management at this time.  Patient understands will not escalate her dose up further..  Patient will be due for urine drug screen at next visit.  Patient continues to use gabapentin at bedtime.

## 2022-01-30 NOTE — PROGRESS NOTES
"  Subjective     Patient ID: Hilda Cornelius is a 56 y.o. female.    HPI patient here for follow-up of blood pressure, diabetes, chronic pain and anxiety  Review of Systems   Constitutional: Negative.    HENT: Negative.    Eyes: Negative.    Respiratory: Negative.  Negative for cough and shortness of breath.    Cardiovascular: Negative.    Gastrointestinal: Negative.    Endocrine: Negative.    Genitourinary: Negative.    Musculoskeletal: Positive for arthralgias, back pain and myalgias.   Skin: Negative.    Allergic/Immunologic: Negative.    Neurological: Negative.    Hematological: Negative.    Psychiatric/Behavioral: Negative.        Objective     Vitals:    01/27/22 1808   BP: 140/82   BP Location: Right upper arm   Patient Position: Sitting   Pulse: (!) 58   Resp: 16   Temp: 36.3 °C (97.3 °F)   TempSrc: Temporal   SpO2: 97%   Weight: 82.6 kg (182 lb)   Height: 1.676 m (5' 6\")     Body mass index is 29.38 kg/m².    Physical Exam  Vitals and nursing note reviewed.   Constitutional:       Appearance: She is well-developed.   HENT:      Head: Normocephalic and atraumatic.      Right Ear: External ear normal.      Left Ear: External ear normal.      Nose: Nose normal.   Eyes:      Conjunctiva/sclera: Conjunctivae normal.      Pupils: Pupils are equal, round, and reactive to light.   Cardiovascular:      Rate and Rhythm: Normal rate and regular rhythm.      Pulses: Normal pulses.      Heart sounds: Normal heart sounds. No murmur heard.    No gallop.   Pulmonary:      Effort: Pulmonary effort is normal.      Breath sounds: Normal breath sounds.   Abdominal:      General: Bowel sounds are normal.      Palpations: Abdomen is soft.   Musculoskeletal:         General: Tenderness present. No swelling. Normal range of motion.      Cervical back: Normal range of motion and neck supple.      Right lower leg: No edema.      Left lower leg: No edema.   Skin:     General: Skin is warm and dry.      Capillary Refill: Capillary " refill takes less than 2 seconds.   Neurological:      General: No focal deficit present.      Mental Status: She is alert and oriented to person, place, and time.      Cranial Nerves: No cranial nerve deficit.   Psychiatric:         Mood and Affect: Mood normal.         Behavior: Behavior normal.         Thought Content: Thought content normal.         Judgment: Judgment normal.         Assessment/Plan   Diagnoses and all orders for this visit:    Chronic pain syndrome (Primary)  Assessment & Plan:  Patient with a history of chronic pain syndrome.  She is on 37 mcg of fentanyl every 72 hours.  And she also takes six oxycodones a day.  Patient declines referral to pain management at this time.  Patient understands will not escalate her dose up further..  Patient will be due for urine drug screen at next visit.  Patient continues to use gabapentin at bedtime.      Type 2 diabetes mellitus without complication, without long-term current use of insulin (CMS/[facility])  Assessment & Plan:  Patient has started to follow with endocrinology I believe at [place].  We will trying to track down her most recent labs that were just done.  She has had Trulicity added to her regimen.  And she reports an immediate improvement in her diabetic control.  As far as I know she continues on her Januvia and Metformin.  We will follow closely reports from endocrine.      Anxiety  Assessment & Plan:  Patient is on lamotrigine 25 mg daily Lexapro 20 mg daily alprazolam 0.5 mg up to three times a day she also takes gabapentin 100 mg at bedtime.  Patient seems to be well managed on this.  Discussed concern over the combination of medicines that she is taking.  However she has been on this regimen for several years prior to my taking over her care.  She is reluctant to downsize any of her medicines at this time.      Bronchospasm  Assessment & Plan:  Patient did request a refill for her albuterol inhaler.  For her intermittent asthma/bronchitis.  That  is reordered for her today.      Hypertension, essential  Assessment & Plan:  Patient with good blood pressure control. Continue present regimen.       Other orders  -     albuterol HFA (VENTOLIN HFA) 90 mcg/actuation inhaler; Inhale 2 puffs every 6 (six) hours as needed for wheezing.

## 2022-01-30 NOTE — ASSESSMENT & PLAN NOTE
Patient is on lamotrigine 25 mg daily Lexapro 20 mg daily alprazolam 0.5 mg up to three times a day she also takes gabapentin 100 mg at bedtime.  Patient seems to be well managed on this.  Discussed concern over the combination of medicines that she is taking.  However she has been on this regimen for several years prior to my taking over her care.  She is reluctant to downsize any of her medicines at this time.

## 2022-01-30 NOTE — ASSESSMENT & PLAN NOTE
Patient did request a refill for her albuterol inhaler.  For her intermittent asthma/bronchitis.  That is reordered for her today.

## 2022-01-30 NOTE — ASSESSMENT & PLAN NOTE
Patient has started to follow with endocrinology I believe at [place].  We will trying to track down her most recent labs that were just done.  She has had Trulicity added to her regimen.  And she reports an immediate improvement in her diabetic control.  As far as I know she continues on her Januvia and Metformin.  We will follow closely reports from endocrine.

## 2022-02-03 DIAGNOSIS — F41.9 ANXIETY: ICD-10-CM

## 2022-02-03 RX ORDER — ALPRAZOLAM 0.5 MG/1
1 TABLET ORAL 3 TIMES DAILY PRN
Qty: 90 TABLET | Refills: 1 | Status: SHIPPED | OUTPATIENT
Start: 2022-02-03 | End: 2022-03-16 | Stop reason: SDUPTHER

## 2022-02-03 NOTE — TELEPHONE ENCOUNTER
Medicine Refill Request    Last Office: 1/27/2022   Last Consult Visit: Visit date not found  Last Telemedicine Visit: 1/25/2021 Emily Sullivan MD    Next Appointment: 4/28/2022      Current Outpatient Medications:   •  albuterol HFA (VENTOLIN HFA) 90 mcg/actuation inhaler, Inhale 2 puffs every 6 (six) hours as needed for wheezing., Disp: 18 g, Rfl: 1  •  ALPRAZolam 0.5 mg tablet, Take 2 tablets (1 mg total) by mouth 3 (three) times a day as needed for anxiety., Disp: 90 tablet, Rfl: 1  •  blood-glucose meter (PRECISION XTRA MONITOR) misc, 1 strip 2 (two) times a day., Disp: 1 each, Rfl: 0  •  escitalopram (LEXAPRO) 20 mg tablet, Take 1 tablet (20 mg total) by mouth daily., Disp: 90 tablet, Rfl: 1  •  fentaNYL 12 mcg/hr, Place 1 patch on the skin every 3 (three) days. Use with 25 Mcg patch for total of 37 mcg., Disp: 10 patch, Rfl: 0  •  fentaNYL 25 mcg/hr, Place 1 patch on the skin See admin instr. Apply 1 patch topically every 72 hours. Use with 12mcg, for 37 mcg total., Disp: 10 patch, Rfl: 0  •  gabapentin (NEURONTIN) 100 mg capsule, Take 1 capsule (100 mg total) by mouth nightly., Disp: 90 capsule, Rfl: 1  •  hydrochlorothiazide (HYDRODIURIL) 25 mg tablet, Take 1 tablet (25 mg total) by mouth once daily., Disp: 90 tablet, Rfl: 1  •  lamoTRIgine (LaMICtal) 25 mg tablet, TAKE 1 TABLET AT BEDTIME FOR 2 WEEKS, THEN INCREASE TO 2 TABLETS AT BEDTIME, Disp: , Rfl:   •  lancets (freestyle) 28 gauge misc, 1 strip 2 (two) times a day., Disp: 100 each, Rfl: 3  •  lansoprazole 30 mg capsule, Take 1 capsule (30 mg total) by mouth daily before breakfast., Disp: 90 capsule, Rfl: 1  •  meloxicam (MOBIC) 15 mg tablet, TAKE 1 TABLET DAILY IF NEEDED WITH A FULL STOMACH, Disp: , Rfl:   •  metFORMIN (GLUCOPHAGE) 1,000 mg tablet, Take 1 tablet (1,000 mg total) by mouth 2 (two) times a day with meals. Increasing dose to 1000 mg. BID. Patient will use what she has, and call when she needs refills., Disp: 180 tablet, Rfl: 1  •   metoprolol succinate XL (TOPROL-XL) 100 mg 24 hr tablet, Take 1 tablet (100 mg total) by mouth daily., Disp: 90 tablet, Rfl: 1  •  olmesartan (BENICAR) 40 mg tablet, Take 1 tablet (40 mg total) by mouth daily., Disp: 90 tablet, Rfl: 1  •  oxyCODONE (ROXICODONE) 5 mg immediate release tablet, Take 1 tablet (5 mg total) by mouth every 4 (four) hours as needed for moderate pain., Disp: 180 tablet, Rfl: 0  •  SITagliptin (JANUVIA) 100 mg tablet, Take 1 tablet (100 mg total) by mouth daily., Disp: 90 tablet, Rfl: 1      BP Readings from Last 3 Encounters:   01/27/22 140/82   10/28/21 130/72   07/19/21 130/78       Recent Lab results:  Lab Results   Component Value Date    CHOL 219 (H) 04/19/2021   ,   Lab Results   Component Value Date    HDL 44 04/19/2021   ,   Lab Results   Component Value Date    LDLCALC 126 (H) 04/19/2021   ,   Lab Results   Component Value Date    TRIG 278 (H) 04/19/2021        Lab Results   Component Value Date    GLUCOSE 397 (H) 04/19/2021   ,   Lab Results   Component Value Date    HGBA1C 8.2 (H) 04/19/2021         Lab Results   Component Value Date    CREATININE 0.68 04/19/2021       Lab Results   Component Value Date    TSH 4.890 (H) 12/30/2019

## 2022-02-16 DIAGNOSIS — M51.369 DEGENERATION OF LUMBAR INTERVERTEBRAL DISC: ICD-10-CM

## 2022-02-16 RX ORDER — FENTANYL 25 UG/1
1 PATCH TRANSDERMAL SEE ADMIN INSTRUCTIONS
Qty: 10 PATCH | Refills: 0 | Status: SHIPPED | OUTPATIENT
Start: 2022-02-16 | End: 2022-03-16 | Stop reason: SDUPTHER

## 2022-02-16 RX ORDER — OXYCODONE HYDROCHLORIDE 5 MG/1
5 TABLET ORAL EVERY 4 HOURS PRN
Qty: 180 TABLET | Refills: 0 | Status: SHIPPED | OUTPATIENT
Start: 2022-02-16 | End: 2022-03-16 | Stop reason: SDUPTHER

## 2022-02-16 NOTE — TELEPHONE ENCOUNTER
Medicine Refill Request    Last Office: 1/27/2022   Last Consult Visit: Visit date not found  Last Telemedicine Visit: 1/25/2021 Emily Sullivan MD    Next Appointment: 4/28/2022      Current Outpatient Medications:   •  albuterol HFA (VENTOLIN HFA) 90 mcg/actuation inhaler, Inhale 2 puffs every 6 (six) hours as needed for wheezing., Disp: 18 g, Rfl: 1  •  ALPRAZolam (XANAX) 0.5 mg tablet, TAKE 2 TABLETS (1 MG TOTAL) BY MOUTH 3 (THREE) TIMES A DAY AS NEEDED FOR ANXIETY., Disp: 90 tablet, Rfl: 1  •  blood-glucose meter (PRECISION XTRA MONITOR) misc, 1 strip 2 (two) times a day., Disp: 1 each, Rfl: 0  •  escitalopram (LEXAPRO) 20 mg tablet, Take 1 tablet (20 mg total) by mouth daily., Disp: 90 tablet, Rfl: 1  •  fentaNYL 12 mcg/hr, Place 1 patch on the skin every 3 (three) days. Use with 25 Mcg patch for total of 37 mcg., Disp: 10 patch, Rfl: 0  •  fentaNYL 25 mcg/hr, Place 1 patch on the skin See admin instr. Apply 1 patch topically every 72 hours. Use with 12mcg, for 37 mcg total., Disp: 10 patch, Rfl: 0  •  gabapentin (NEURONTIN) 100 mg capsule, Take 1 capsule (100 mg total) by mouth nightly., Disp: 90 capsule, Rfl: 1  •  hydrochlorothiazide (HYDRODIURIL) 25 mg tablet, Take 1 tablet (25 mg total) by mouth once daily., Disp: 90 tablet, Rfl: 1  •  lamoTRIgine (LaMICtal) 25 mg tablet, TAKE 1 TABLET AT BEDTIME FOR 2 WEEKS, THEN INCREASE TO 2 TABLETS AT BEDTIME, Disp: , Rfl:   •  lancets (freestyle) 28 gauge misc, 1 strip 2 (two) times a day., Disp: 100 each, Rfl: 3  •  lansoprazole 30 mg capsule, Take 1 capsule (30 mg total) by mouth daily before breakfast., Disp: 90 capsule, Rfl: 1  •  meloxicam (MOBIC) 15 mg tablet, TAKE 1 TABLET DAILY IF NEEDED WITH A FULL STOMACH, Disp: , Rfl:   •  metFORMIN (GLUCOPHAGE) 1,000 mg tablet, Take 1 tablet (1,000 mg total) by mouth 2 (two) times a day with meals. Increasing dose to 1000 mg. BID. Patient will use what she has, and call when she needs refills., Disp: 180 tablet, Rfl:  1  •  metoprolol succinate XL (TOPROL-XL) 100 mg 24 hr tablet, Take 1 tablet (100 mg total) by mouth daily., Disp: 90 tablet, Rfl: 1  •  olmesartan (BENICAR) 40 mg tablet, Take 1 tablet (40 mg total) by mouth daily., Disp: 90 tablet, Rfl: 1  •  oxyCODONE (ROXICODONE) 5 mg immediate release tablet, Take 1 tablet (5 mg total) by mouth every 4 (four) hours as needed for moderate pain., Disp: 180 tablet, Rfl: 0  •  SITagliptin (JANUVIA) 100 mg tablet, Take 1 tablet (100 mg total) by mouth daily., Disp: 90 tablet, Rfl: 1      BP Readings from Last 3 Encounters:   01/27/22 140/82   10/28/21 130/72   07/19/21 130/78       Recent Lab results:  Lab Results   Component Value Date    CHOL 219 (H) 04/19/2021   ,   Lab Results   Component Value Date    HDL 44 04/19/2021   ,   Lab Results   Component Value Date    LDLCALC 126 (H) 04/19/2021   ,   Lab Results   Component Value Date    TRIG 278 (H) 04/19/2021        Lab Results   Component Value Date    GLUCOSE 397 (H) 04/19/2021   ,   Lab Results   Component Value Date    HGBA1C 8.2 (H) 04/19/2021         Lab Results   Component Value Date    CREATININE 0.68 04/19/2021       Lab Results   Component Value Date    TSH 4.890 (H) 12/30/2019

## 2022-02-16 NOTE — TELEPHONE ENCOUNTER
Patient contacted office to request prescription refill:    Name of medication:  fentaNYL  Strength of medication:25 mcg/hr  SI patch topically every 72 hrs    Quantity:10 patches  Requested number refills:0    oxyCODONE   5 mg  Take 1 tab every 4 hrs prn moderate pain  180 tabs  No refill      Preferred Pharmacy:[facility]  Pharmacy number:[phone]

## 2022-02-23 ENCOUNTER — TELEPHONE (OUTPATIENT)
Dept: FAMILY MEDICINE | Facility: CLINIC | Age: 57
End: 2022-02-23

## 2022-02-23 NOTE — TELEPHONE ENCOUNTER
Pt called and stated the pharmacy needs a prior auth for fentanyl patches. Pt can be reached at [phone]

## 2022-02-24 DIAGNOSIS — M51.369 DEGENERATION OF LUMBAR INTERVERTEBRAL DISC: ICD-10-CM

## 2022-02-24 NOTE — TELEPHONE ENCOUNTER
PA for Fentanyl 12mcg patches submitted today. Will contact pt when I have response. Per CoverMyMeds, can take up to 72 hours but did expedite review process.

## 2022-02-24 NOTE — TELEPHONE ENCOUNTER
Patient called in stating that pharmacy contacted her in regards to pre auth for fentaNYL (DURAGESIC) . 25 mg patches were approved and will be filled but prior auth for 12 mg hasn't been completed . Please assist

## 2022-02-24 NOTE — TELEPHONE ENCOUNTER
Medicine Refill Request    Last Office: 1/27/2022   Last Consult Visit: Visit date not found  Last Telemedicine Visit: 1/25/2021 Emily Sullivan MD    Next Appointment: 4/28/2022      Current Outpatient Medications:   •  albuterol HFA (VENTOLIN HFA) 90 mcg/actuation inhaler, INHALE 2 PUFFS EVERY 6 HOURS AS NEEDED FOR WHEEZING, Disp: 8.5 each, Rfl: 1  •  ALPRAZolam (XANAX) 0.5 mg tablet, TAKE 2 TABLETS (1 MG TOTAL) BY MOUTH 3 (THREE) TIMES A DAY AS NEEDED FOR ANXIETY., Disp: 90 tablet, Rfl: 1  •  blood-glucose meter (PRECISION XTRA MONITOR) misc, 1 strip 2 (two) times a day., Disp: 1 each, Rfl: 0  •  escitalopram (LEXAPRO) 20 mg tablet, Take 1 tablet (20 mg total) by mouth daily., Disp: 90 tablet, Rfl: 1  •  fentaNYL (DURAGESIC) 25 mcg/hr, Place 1 patch on the skin See admin instr. Apply 1 patch topically every 72 hours. Use with 12mcg, for 37 mcg total., Disp: 10 patch, Rfl: 0  •  fentaNYL 12 mcg/hr, Place 1 patch on the skin every 3 (three) days. Use with 25 Mcg patch for total of 37 mcg., Disp: 10 patch, Rfl: 0  •  gabapentin (NEURONTIN) 100 mg capsule, Take 1 capsule (100 mg total) by mouth nightly., Disp: 90 capsule, Rfl: 1  •  hydrochlorothiazide (HYDRODIURIL) 25 mg tablet, Take 1 tablet (25 mg total) by mouth once daily., Disp: 90 tablet, Rfl: 1  •  lamoTRIgine (LaMICtal) 25 mg tablet, TAKE 1 TABLET AT BEDTIME FOR 2 WEEKS, THEN INCREASE TO 2 TABLETS AT BEDTIME, Disp: , Rfl:   •  lancets (freestyle) 28 gauge misc, 1 strip 2 (two) times a day., Disp: 100 each, Rfl: 3  •  lansoprazole 30 mg capsule, Take 1 capsule (30 mg total) by mouth daily before breakfast., Disp: 90 capsule, Rfl: 1  •  meloxicam (MOBIC) 15 mg tablet, TAKE 1 TABLET DAILY IF NEEDED WITH A FULL STOMACH, Disp: , Rfl:   •  metFORMIN (GLUCOPHAGE) 1,000 mg tablet, Take 1 tablet (1,000 mg total) by mouth 2 (two) times a day with meals. Increasing dose to 1000 mg. BID. Patient will use what she has, and call when she needs refills., Disp: 180  tablet, Rfl: 1  •  metoprolol succinate XL (TOPROL-XL) 100 mg 24 hr tablet, Take 1 tablet (100 mg total) by mouth daily., Disp: 90 tablet, Rfl: 1  •  olmesartan (BENICAR) 40 mg tablet, Take 1 tablet (40 mg total) by mouth daily., Disp: 90 tablet, Rfl: 1  •  oxyCODONE (ROXICODONE) 5 mg immediate release tablet, Take 1 tablet (5 mg total) by mouth every 4 (four) hours as needed for moderate pain., Disp: 180 tablet, Rfl: 0  •  SITagliptin (JANUVIA) 100 mg tablet, Take 1 tablet (100 mg total) by mouth daily., Disp: 90 tablet, Rfl: 1      BP Readings from Last 3 Encounters:   01/27/22 140/82   10/28/21 130/72   07/19/21 130/78       Recent Lab results:  Lab Results   Component Value Date    CHOL 219 (H) 04/19/2021   ,   Lab Results   Component Value Date    HDL 44 04/19/2021   ,   Lab Results   Component Value Date    LDLCALC 126 (H) 04/19/2021   ,   Lab Results   Component Value Date    TRIG 278 (H) 04/19/2021        Lab Results   Component Value Date    GLUCOSE 397 (H) 04/19/2021   ,   Lab Results   Component Value Date    HGBA1C 8.2 (H) 04/19/2021         Lab Results   Component Value Date    CREATININE 0.68 04/19/2021       Lab Results   Component Value Date    TSH 4.890 (H) 12/30/2019

## 2022-02-25 RX ORDER — FENTANYL 12.5 UG/1
1 PATCH TRANSDERMAL
Qty: 10 PATCH | Refills: 0 | Status: SHIPPED | OUTPATIENT
Start: 2022-02-25 | End: 2022-04-04 | Stop reason: SDUPTHER

## 2022-02-28 ENCOUNTER — TELEPHONE (OUTPATIENT)
Dept: FAMILY MEDICINE | Facility: CLINIC | Age: 57
End: 2022-02-28

## 2022-02-28 NOTE — TELEPHONE ENCOUNTER
RN spoke to patient. She reports chest tightness when taking deep breaths, voice hoarseness symptoms started Wednesday. Went to ED, was prescribed azithromycin. Currently denies fever, sob, dizziness, palpitations. Was not tested for Covid. Had xray done. Patient offered same day appt but unable to come in. Advised ED if symptoms worsen. Agreeable to office visit Wednesday.

## 2022-02-28 NOTE — TELEPHONE ENCOUNTER
Recently in ER at [facility]  2/26 for  SOB extreme fatigue    Issued albuteral steroid and antibiotics    Still feeling horse voice and chest tightness    Ordered a nebulizer from [employer] but havn't used it.  Saw Dr. Sullivan on 1/27 and discussed use of this device.    Available anytime today forreturn call.  Pt said she has concerning questions about her meds.

## 2022-03-02 ENCOUNTER — OFFICE VISIT (OUTPATIENT)
Dept: FAMILY MEDICINE | Facility: CLINIC | Age: 57
End: 2022-03-02
Payer: COMMERCIAL

## 2022-03-02 ENCOUNTER — TELEPHONE (OUTPATIENT)
Dept: FAMILY MEDICINE | Facility: CLINIC | Age: 57
End: 2022-03-02

## 2022-03-02 VITALS
RESPIRATION RATE: 16 BRPM | OXYGEN SATURATION: 97 % | HEART RATE: 69 BPM | SYSTOLIC BLOOD PRESSURE: 130 MMHG | DIASTOLIC BLOOD PRESSURE: 82 MMHG | TEMPERATURE: 97.2 F | BODY MASS INDEX: 29.25 KG/M2 | WEIGHT: 182 LBS | HEIGHT: 66 IN

## 2022-03-02 DIAGNOSIS — J06.9 VIRAL UPPER RESPIRATORY TRACT INFECTION: Primary | ICD-10-CM

## 2022-03-02 DIAGNOSIS — Z20.822 SUSPECTED COVID-19 VIRUS INFECTION: ICD-10-CM

## 2022-03-02 PROCEDURE — 3008F BODY MASS INDEX DOCD: CPT | Performed by: INTERNAL MEDICINE

## 2022-03-02 PROCEDURE — 99214 OFFICE O/P EST MOD 30 MIN: CPT | Mod: CS | Performed by: INTERNAL MEDICINE

## 2022-03-02 PROCEDURE — 3075F SYST BP GE 130 - 139MM HG: CPT | Performed by: INTERNAL MEDICINE

## 2022-03-02 PROCEDURE — 3079F DIAST BP 80-89 MM HG: CPT | Performed by: INTERNAL MEDICINE

## 2022-03-02 PROCEDURE — 87637 SARSCOV2&INF A&B&RSV AMP PRB: CPT | Performed by: INTERNAL MEDICINE

## 2022-03-02 RX ORDER — FLASH GLUCOSE SENSOR
KIT MISCELLANEOUS
COMMUNITY
Start: 2022-02-03

## 2022-03-02 RX ORDER — OXYCODONE AND ACETAMINOPHEN 10; 325 MG/1; MG/1
2 TABLET ORAL
COMMUNITY
End: 2022-04-19 | Stop reason: ENTERED-IN-ERROR

## 2022-03-02 RX ORDER — AZITHROMYCIN 250 MG/1
TABLET, FILM COATED ORAL
COMMUNITY
Start: 2022-02-26 | End: 2022-03-04

## 2022-03-02 RX ORDER — GUAIFENESIN 600 MG/1
600 TABLET, EXTENDED RELEASE ORAL 2 TIMES DAILY
Qty: 60 TABLET | Refills: 0 | Status: SHIPPED | OUTPATIENT
Start: 2022-03-02 | End: 2022-04-01

## 2022-03-02 RX ORDER — AZITHROMYCIN 250 MG/1
TABLET, FILM COATED ORAL
COMMUNITY
Start: 2022-02-26 | End: 2022-03-02 | Stop reason: ALTCHOICE

## 2022-03-02 ASSESSMENT — ENCOUNTER SYMPTOMS
CONSTITUTIONAL NEGATIVE: 1
ENDOCRINE NEGATIVE: 1
ALLERGIC/IMMUNOLOGIC NEGATIVE: 1
EYES NEGATIVE: 1
HEMATOLOGIC/LYMPHATIC NEGATIVE: 1
PSYCHIATRIC NEGATIVE: 1
MUSCULOSKELETAL NEGATIVE: 1
NEUROLOGICAL NEGATIVE: 1
COUGH: 1
GASTROINTESTINAL NEGATIVE: 1
CARDIOVASCULAR NEGATIVE: 1

## 2022-03-02 ASSESSMENT — PATIENT HEALTH QUESTIONNAIRE - PHQ9: SUM OF ALL RESPONSES TO PHQ9 QUESTIONS 1 & 2: 0

## 2022-03-02 NOTE — PROGRESS NOTES
Hilda Cornelius is a 56 y.o. female presenting for evaluation of cold symptoms.    HPI    ED visit for shortness of breath, chest pain on 2/26, per patient heart and lungs were fine, did not test her for covid, but did not check for covid  Gave her prednisone, ipratropium/albuterol, zpack  Left ear issues  Taking Mucinex    Quit smoking 5 weeks ago, declines lung cancer screening.      Past Medical History:   Diagnosis Date   • Abdominal pain    • Acute bacterial conjunctivitis of both eyes 1/29/2020   • Acute conjunctivitis of both eyes 6/19/2018   • Acute JACKIE (middle ear effusion), right 9/3/2019   • Acute non-recurrent maxillary sinusitis 1/29/2020   • Acute URI 3/22/2019   • Anxiety    • Back pain    • Hypertension    • MDD (major depressive disorder)    • Parotitis 9/10/2018   • Sinobronchitis 1/29/2019   • Type 2 diabetes mellitus (CMS/HCC)        Past Surgical History:   Procedure Laterality Date   • KNEE SURGERY     • TONSILLECTOMY          Social History     Socioeconomic History   • Marital status:      Spouse name: None   • Number of children: None   • Years of education: None   • Highest education level: None   Occupational History   • None   Tobacco Use   • Smoking status: Current Every Day Smoker     Packs/day: 0.50     Years: 40.00     Pack years: 20.00     Types: Cigarettes   • Smokeless tobacco: Never Used   Substance and Sexual Activity   • Alcohol use: No   • Drug use: No   • Sexual activity: Yes     Partners: Male   Other Topics Concern   • None   Social History Narrative   • None     Social Determinants of Health     Financial Resource Strain: Not on file   Food Insecurity: Not on file   Transportation Needs: Not on file   Physical Activity: Not on file   Stress: Not on file   Social Connections: Not on file   Intimate Partner Violence: Not on file   Housing Stability: Not on file       Family History   Problem Relation Age of Onset   • Heart disease Biological Mother    • No Known  Problems Biological Father        B complex-vitamin c-folic acid, Hydrocodone, Vitamin b complex, Liraglutide, and Adhesive    Current Outpatient Medications   Medication Sig Dispense Refill   • albuterol HFA (VENTOLIN HFA) 90 mcg/actuation inhaler INHALE 2 PUFFS EVERY 6 HOURS AS NEEDED FOR WHEEZING 8.5 each 1   • ALPRAZolam (XANAX) 0.5 mg tablet TAKE 2 TABLETS (1 MG TOTAL) BY MOUTH 3 (THREE) TIMES A DAY AS NEEDED FOR ANXIETY. 90 tablet 1   • azithromycin (ZITHROMAX) 250 mg tablet Take 2 tablets (500mg) by mouth on day 1. On days 2 through 5, take one tablet (250mg) by mouth     • blood-glucose meter (PRECISION XTRA MONITOR) misc 1 strip 2 (two) times a day. 1 each 0   • escitalopram (LEXAPRO) 20 mg tablet Take 1 tablet (20 mg total) by mouth daily. 90 tablet 1   • fentaNYL (DURAGESIC) 12 mcg/hr Place 1 patch on the skin every 3 (three) days. Use with 25 Mcg patch for total of 37 mcg. 10 patch 0   • fentaNYL (DURAGESIC) 25 mcg/hr Place 1 patch on the skin See admin instr. Apply 1 patch topically every 72 hours. Use with 12mcg, for 37 mcg total. 10 patch 0   • FREESTYLE THOM 2 SENSOR kit PLACE 1 DEVICE ON THE SKIN EVERY 2 WEEKS.     • gabapentin (NEURONTIN) 100 mg capsule Take 1 capsule (100 mg total) by mouth nightly. 90 capsule 1   • hydrochlorothiazide (HYDRODIURIL) 25 mg tablet Take 1 tablet (25 mg total) by mouth once daily. 90 tablet 1   • lamoTRIgine (LaMICtal) 25 mg tablet TAKE 1 TABLET AT BEDTIME FOR 2 WEEKS, THEN INCREASE TO 2 TABLETS AT BEDTIME     • lancets (freestyle) 28 gauge misc 1 strip 2 (two) times a day. 100 each 3   • lansoprazole 30 mg capsule Take 1 capsule (30 mg total) by mouth daily before breakfast. 90 capsule 1   • meloxicam (MOBIC) 15 mg tablet TAKE 1 TABLET DAILY IF NEEDED WITH A FULL STOMACH     • metFORMIN (GLUCOPHAGE) 1,000 mg tablet Take 1 tablet (1,000 mg total) by mouth 2 (two) times a day with meals. Increasing dose to 1000 mg. BID. Patient will use what she has, and call when she  needs refills. 180 tablet 1   • metoprolol succinate XL (TOPROL-XL) 100 mg 24 hr tablet Take 1 tablet (100 mg total) by mouth daily. 90 tablet 1   • olmesartan (BENICAR) 40 mg tablet Take 1 tablet (40 mg total) by mouth daily. 90 tablet 1   • oxyCODONE (ROXICODONE) 5 mg immediate release tablet Take 1 tablet (5 mg total) by mouth every 4 (four) hours as needed for moderate pain. 180 tablet 0   • oxyCODONE-acetaminophen (PERCOCET)  mg per tablet Take 2 tablets by mouth.     • SITagliptin (JANUVIA) 100 mg tablet Take 1 tablet (100 mg total) by mouth daily. 90 tablet 1   • guaiFENesin (MUCINEX) 600 mg 12 hr tablet Take 1 tablet (600 mg total) by mouth 2 (two) times a day. 60 tablet 0     No current facility-administered medications for this visit.       Review of Systems   Constitutional: Negative.    HENT: Positive for congestion.    Eyes: Negative.    Respiratory: Positive for cough.    Cardiovascular: Negative.    Gastrointestinal: Negative.    Endocrine: Negative.    Genitourinary: Negative.    Musculoskeletal: Negative.    Skin: Negative.    Allergic/Immunologic: Negative.    Neurological: Negative.    Hematological: Negative.    Psychiatric/Behavioral: Negative.           Vitals:    03/02/22 1812   BP: 130/82   Pulse: 69   Resp: 16   Temp: 36.2 °C (97.2 °F)   SpO2: 97%       Body mass index is 29.38 kg/m².      Physical Exam  Vitals reviewed.   Constitutional:       General: She is not in acute distress.     Appearance: Normal appearance.   HENT:      Head: Normocephalic and atraumatic.      Right Ear: Tympanic membrane, ear canal and external ear normal.      Left Ear: Tympanic membrane, ear canal and external ear normal.      Nose: Nose normal.      Mouth/Throat:      Pharynx: No oropharyngeal exudate or posterior oropharyngeal erythema.   Eyes:      General: No scleral icterus.     Extraocular Movements: Extraocular movements intact.      Conjunctiva/sclera: Conjunctivae normal.   Cardiovascular:       Rate and Rhythm: Normal rate and regular rhythm.      Pulses: Normal pulses.      Heart sounds: Normal heart sounds.   Pulmonary:      Effort: Pulmonary effort is normal. No respiratory distress.      Breath sounds: Normal breath sounds.   Abdominal:      General: Abdomen is flat. Bowel sounds are normal. There is no distension.      Palpations: Abdomen is soft.      Tenderness: There is no abdominal tenderness.   Musculoskeletal:         General: No swelling.      Cervical back: Normal range of motion.   Lymphadenopathy:      Cervical: No cervical adenopathy.   Skin:     General: Skin is warm and dry.      Findings: No rash.   Neurological:      General: No focal deficit present.      Mental Status: She is alert. Mental status is at baseline.   Psychiatric:         Mood and Affect: Mood normal.         Behavior: Behavior normal.              Assessment/Plan      Diagnoses and all orders for this visit:    Viral upper respiratory tract infection (Primary)  Assessment & Plan:  I suspect the patient has a viral URI.  Recent chest x-ray and EKG were within normal limits per the patient.  Advised her to complete for Z-J Carlos, continue with ipratropium/albuterol every 8 hours as needed.  She can continue with hypertonic saline nebulized solutions 2-3 times a day.  We will test for Covid today.    Orders:  -     guaiFENesin (MUCINEX) 600 mg 12 hr tablet; Take 1 tablet (600 mg total) by mouth 2 (two) times a day.    Suspected COVID-19 virus infection  -     COVID- 19 PCR Symptomatic (includes FLU A/B & RSV) - Health system Lab        New Medications Ordered This Visit   Medications   • oxyCODONE-acetaminophen (PERCOCET)  mg per tablet     Sig: Take 2 tablets by mouth.   • FREESTYLE THMO 2 SENSOR kit     Sig: PLACE 1 DEVICE ON THE SKIN EVERY 2 WEEKS.   • azithromycin (ZITHROMAX) 250 mg tablet     Sig: Take 2 tablets (500mg) by mouth on day 1. On days 2 through 5, take one tablet (250mg) by mouth   • guaiFENesin (MUCINEX) 600 mg  "12 hr tablet     Sig: Take 1 tablet (600 mg total) by mouth 2 (two) times a day.     Dispense:  60 tablet     Refill:  0       Medications Discontinued During This Encounter   Medication Reason   • azithromycin (ZITHROMAX) 250 mg tablet Therapy completed        Orders Placed This Encounter   Procedures   • COVID- 19 PCR Symptomatic (includes FLU A/B & RSV) - [facility]     Order Specific Question:   Reason for testing:     Answer:   Symptomatic/COVID Suspected     Order Specific Question:   COVID-19 PUI? (Always arnold \"yes\" regardless of resulting lab)     Answer:   Yes     Order Specific Question:   Symptomatic for COVID-19 as defined by CDC?     Answer:   Yes     Order Specific Question:   Date of Symptom Onset     Answer:   2/24/2022     Order Specific Question:   Hospitalized for COVID-19?     Answer:   No     Order Specific Question:   Admitted to ICU for COVID-19?     Answer:   No     Order Specific Question:   Employed in healthcare setting?     Answer:   Unknown     Order Specific Question:   Resident in a congregate (group) care setting?     Answer:   Unknown     Order Specific Question:   Pregnant?     Answer:   Unknown     Order Specific Question:   Release to patient     Answer:   Immediate     Order Specific Question:   Source of Specimen(s)     Answer:   Nose [27]      I spent 35 minutes on this date of service performing the following activities: obtaining history, performing examination, entering orders, documenting, preparing for visit, obtaining / reviewing records, providing counseling and education, independently reviewing study/studies, communicating results and coordinating care.      Carey Montesinos MD  3/2/2022          "

## 2022-03-03 LAB
FLUAV RNA SPEC QL NAA+PROBE: NEGATIVE
FLUBV RNA SPEC QL NAA+PROBE: NEGATIVE
RSV RNA SPEC QL NAA+PROBE: NEGATIVE
SARS-COV-2 RNA RESP QL NAA+PROBE: NEGATIVE

## 2022-03-03 NOTE — ASSESSMENT & PLAN NOTE
I suspect the patient has a viral URI.  Recent chest x-ray and EKG were within normal limits per the patient.  Advised her to complete for Z-J Carlos, continue with ipratropium/albuterol every 8 hours as needed.  She can continue with hypertonic saline nebulized solutions 2-3 times a day.  We will test for Covid today.

## 2022-03-16 DIAGNOSIS — F41.9 ANXIETY: ICD-10-CM

## 2022-03-16 DIAGNOSIS — M51.369 DEGENERATION OF LUMBAR INTERVERTEBRAL DISC: ICD-10-CM

## 2022-03-16 RX ORDER — FENTANYL 25 UG/1
1 PATCH TRANSDERMAL SEE ADMIN INSTRUCTIONS
Qty: 10 PATCH | Refills: 0 | Status: SHIPPED | OUTPATIENT
Start: 2022-03-16 | End: 2022-04-27 | Stop reason: SDUPTHER

## 2022-03-16 RX ORDER — FENTANYL 12.5 UG/1
1 PATCH TRANSDERMAL
Qty: 10 PATCH | Refills: 0 | OUTPATIENT
Start: 2022-03-16 | End: 2022-04-15

## 2022-03-16 RX ORDER — OXYCODONE HYDROCHLORIDE 5 MG/1
5 TABLET ORAL EVERY 4 HOURS PRN
Qty: 180 TABLET | Refills: 0 | Status: SHIPPED | OUTPATIENT
Start: 2022-03-16 | End: 2022-04-19 | Stop reason: SDUPTHER

## 2022-03-16 RX ORDER — ALPRAZOLAM 0.5 MG/1
1 TABLET ORAL 3 TIMES DAILY PRN
Qty: 90 TABLET | Refills: 1 | Status: SHIPPED | OUTPATIENT
Start: 2022-03-16 | End: 2022-05-18 | Stop reason: SDUPTHER

## 2022-03-16 NOTE — TELEPHONE ENCOUNTER
Medicine Refill Request    Last Office: 3/2/2022   Last Consult Visit: Visit date not found  Last Telemedicine Visit: 1/25/2021 Emily Sullivan MD    Next Appointment: 4/28/2022      Current Outpatient Medications:   •  albuterol HFA (VENTOLIN HFA) 90 mcg/actuation inhaler, INHALE 2 PUFFS EVERY 6 HOURS AS NEEDED FOR WHEEZING, Disp: 8.5 each, Rfl: 1  •  ALPRAZolam (XANAX) 0.5 mg tablet, TAKE 2 TABLETS (1 MG TOTAL) BY MOUTH 3 (THREE) TIMES A DAY AS NEEDED FOR ANXIETY., Disp: 90 tablet, Rfl: 1  •  blood-glucose meter (PRECISION XTRA MONITOR) misc, 1 strip 2 (two) times a day., Disp: 1 each, Rfl: 0  •  escitalopram (LEXAPRO) 20 mg tablet, Take 1 tablet (20 mg total) by mouth daily., Disp: 90 tablet, Rfl: 1  •  fentaNYL (DURAGESIC) 12 mcg/hr, Place 1 patch on the skin every 3 (three) days. Use with 25 Mcg patch for total of 37 mcg., Disp: 10 patch, Rfl: 0  •  fentaNYL (DURAGESIC) 25 mcg/hr, Place 1 patch on the skin See admin instr. Apply 1 patch topically every 72 hours. Use with 12mcg, for 37 mcg total., Disp: 10 patch, Rfl: 0  •  FREESTYLE THOM 2 SENSOR kit, PLACE 1 DEVICE ON THE SKIN EVERY 2 WEEKS., Disp: , Rfl:   •  gabapentin (NEURONTIN) 100 mg capsule, Take 1 capsule (100 mg total) by mouth nightly., Disp: 90 capsule, Rfl: 1  •  guaiFENesin (MUCINEX) 600 mg 12 hr tablet, Take 1 tablet (600 mg total) by mouth 2 (two) times a day., Disp: 60 tablet, Rfl: 0  •  hydrochlorothiazide (HYDRODIURIL) 25 mg tablet, Take 1 tablet (25 mg total) by mouth once daily., Disp: 90 tablet, Rfl: 1  •  lamoTRIgine (LaMICtal) 25 mg tablet, TAKE 1 TABLET AT BEDTIME FOR 2 WEEKS, THEN INCREASE TO 2 TABLETS AT BEDTIME, Disp: , Rfl:   •  lancets (freestyle) 28 gauge misc, 1 strip 2 (two) times a day., Disp: 100 each, Rfl: 3  •  lansoprazole 30 mg capsule, Take 1 capsule (30 mg total) by mouth daily before breakfast., Disp: 90 capsule, Rfl: 1  •  meloxicam (MOBIC) 15 mg tablet, TAKE 1 TABLET DAILY IF NEEDED WITH A FULL STOMACH, Disp: ,  Rfl:   •  metFORMIN (GLUCOPHAGE) 1,000 mg tablet, Take 1 tablet (1,000 mg total) by mouth 2 (two) times a day with meals. Increasing dose to 1000 mg. BID. Patient will use what she has, and call when she needs refills., Disp: 180 tablet, Rfl: 1  •  metoprolol succinate XL (TOPROL-XL) 100 mg 24 hr tablet, Take 1 tablet (100 mg total) by mouth daily., Disp: 90 tablet, Rfl: 1  •  olmesartan (BENICAR) 40 mg tablet, Take 1 tablet (40 mg total) by mouth daily., Disp: 90 tablet, Rfl: 1  •  oxyCODONE (ROXICODONE) 5 mg immediate release tablet, Take 1 tablet (5 mg total) by mouth every 4 (four) hours as needed for moderate pain., Disp: 180 tablet, Rfl: 0  •  oxyCODONE-acetaminophen (PERCOCET)  mg per tablet, Take 2 tablets by mouth., Disp: , Rfl:   •  SITagliptin (JANUVIA) 100 mg tablet, Take 1 tablet (100 mg total) by mouth daily., Disp: 90 tablet, Rfl: 1      BP Readings from Last 3 Encounters:   03/02/22 130/82   01/27/22 140/82   10/28/21 130/72       Recent Lab results:  Lab Results   Component Value Date    CHOL 219 (H) 04/19/2021   ,   Lab Results   Component Value Date    HDL 44 04/19/2021   ,   Lab Results   Component Value Date    LDLCALC 126 (H) 04/19/2021   ,   Lab Results   Component Value Date    TRIG 278 (H) 04/19/2021        Lab Results   Component Value Date    GLUCOSE 397 (H) 04/19/2021   ,   Lab Results   Component Value Date    HGBA1C 8.2 (H) 04/19/2021         Lab Results   Component Value Date    CREATININE 0.68 04/19/2021       Lab Results   Component Value Date    TSH 4.890 (H) 12/30/2019

## 2022-03-16 NOTE — TELEPHONE ENCOUNTER
Patient contacted office to request prescription refill:    Name of medication:fentanyl  Strength of medication:12mcg  SI patch every 3 days  Quantity:10  Requested number refills:  Preferred Pharmacy :[facility]  Pharmacy number:[id number]    Patient contacted office to request prescription refill:    Name of medication:fentanyl 25mcg  Strength of medication:25mcg  SI patch on skin  Quantity:10  Requested number refills:  Preferred Pharmacy:[facility]  Pharmacy number:[id number]    Patient contacted office to request prescription refill:    Name of medication:xanax  Strength of medication:0.5mg  SIG:take 2 by mouth 3x daily  Quantity:90  Requested number refills:  Preferred Pharmacy:[facility]  Pharmacy number:[id number]    Patient contacted office to request prescription refill:    Name of medication:oxycodone  Strength of medication:5mg  SI every 4 hours  Quantity:180  Requested number refills:  Preferred Pharmacy:[facility]  Pharmacy number:[id number]

## 2022-03-17 ENCOUNTER — OFFICE VISIT (OUTPATIENT)
Dept: FAMILY MEDICINE | Facility: CLINIC | Age: 57
End: 2022-03-17
Payer: COMMERCIAL

## 2022-03-17 VITALS
BODY MASS INDEX: 30.05 KG/M2 | RESPIRATION RATE: 16 BRPM | WEIGHT: 187 LBS | HEIGHT: 66 IN | OXYGEN SATURATION: 97 % | DIASTOLIC BLOOD PRESSURE: 76 MMHG | SYSTOLIC BLOOD PRESSURE: 140 MMHG | TEMPERATURE: 98.2 F | HEART RATE: 66 BPM

## 2022-03-17 DIAGNOSIS — R23.8 BLISTERS OF MULTIPLE SITES: Primary | ICD-10-CM

## 2022-03-17 PROCEDURE — 3077F SYST BP >= 140 MM HG: CPT | Performed by: INTERNAL MEDICINE

## 2022-03-17 PROCEDURE — 3008F BODY MASS INDEX DOCD: CPT | Performed by: INTERNAL MEDICINE

## 2022-03-17 PROCEDURE — 3078F DIAST BP <80 MM HG: CPT | Performed by: INTERNAL MEDICINE

## 2022-03-17 PROCEDURE — 99213 OFFICE O/P EST LOW 20 MIN: CPT | Performed by: INTERNAL MEDICINE

## 2022-03-17 RX ORDER — TRIAMCINOLONE ACETONIDE 1 MG/G
1 OINTMENT TOPICAL 2 TIMES DAILY
Qty: 30 G | Refills: 1 | Status: SHIPPED | OUTPATIENT
Start: 2022-03-17 | End: 2022-11-07

## 2022-03-17 RX ORDER — METHYLPREDNISOLONE 4 MG/1
TABLET ORAL
Qty: 21 TABLET | Refills: 0 | Status: SHIPPED | OUTPATIENT
Start: 2022-03-17 | End: 2022-03-24

## 2022-03-17 RX ORDER — TRIAMCINOLONE ACETONIDE 1 MG/G
1 OINTMENT TOPICAL 2 TIMES DAILY
Qty: 30 G | Refills: 1 | Status: SHIPPED | OUTPATIENT
Start: 2022-03-17 | End: 2022-03-17 | Stop reason: SDUPTHER

## 2022-03-17 NOTE — TELEPHONE ENCOUNTER
Fentanyl 12 mg dispensed 2/25. Too early for refill.   Chart and PDMP reviewed. Patient is due, will refill.   Other meds due.

## 2022-03-24 DIAGNOSIS — F41.9 ANXIETY: ICD-10-CM

## 2022-03-24 RX ORDER — METOPROLOL SUCCINATE 100 MG/1
100 TABLET, EXTENDED RELEASE ORAL
Qty: 30 TABLET | Refills: 3 | Status: SHIPPED | OUTPATIENT
Start: 2022-03-24 | End: 2022-07-19

## 2022-03-24 RX ORDER — OLMESARTAN MEDOXOMIL 40 MG/1
40 TABLET ORAL
Qty: 30 TABLET | Refills: 3 | Status: SHIPPED | OUTPATIENT
Start: 2022-03-24 | End: 2022-07-12

## 2022-03-24 RX ORDER — HYDROCHLOROTHIAZIDE 25 MG/1
25 TABLET ORAL
Qty: 30 TABLET | Refills: 3 | Status: SHIPPED | OUTPATIENT
Start: 2022-03-24 | End: 2022-07-12

## 2022-03-24 RX ORDER — ESCITALOPRAM OXALATE 20 MG/1
20 TABLET ORAL
Qty: 30 TABLET | Refills: 3 | Status: SHIPPED | OUTPATIENT
Start: 2022-03-24 | End: 2022-07-12

## 2022-03-24 NOTE — TELEPHONE ENCOUNTER
Medicine Refill Request    Last Office: 3/17/2022   Last Consult Visit: Visit date not found  Last Telemedicine Visit: 1/25/2021 Emily Sullivan MD    Next Appointment: 4/28/2022      Current Outpatient Medications:   •  albuterol HFA (VENTOLIN HFA) 90 mcg/actuation inhaler, INHALE 2 PUFFS EVERY 6 HOURS AS NEEDED FOR WHEEZING, Disp: 8.5 each, Rfl: 1  •  ALPRAZolam (XANAX) 0.5 mg tablet, Take 2 tablets (1 mg total) by mouth 3 (three) times a day as needed for anxiety., Disp: 90 tablet, Rfl: 1  •  blood-glucose meter (PRECISION XTRA MONITOR) misc, 1 strip 2 (two) times a day., Disp: 1 each, Rfl: 0  •  escitalopram (LEXAPRO) 20 mg tablet, Take 1 tablet (20 mg total) by mouth daily., Disp: 90 tablet, Rfl: 1  •  fentaNYL (DURAGESIC) 12 mcg/hr, Place 1 patch on the skin every 3 (three) days. Use with 25 Mcg patch for total of 37 mcg., Disp: 10 patch, Rfl: 0  •  fentaNYL (DURAGESIC) 25 mcg/hr, Place 1 patch on the skin See admin instr. Apply 1 patch topically every 72 hours. Use with 12mcg, for 37 mcg total., Disp: 10 patch, Rfl: 0  •  FREESTYLE THOM 2 SENSOR kit, PLACE 1 DEVICE ON THE SKIN EVERY 2 WEEKS., Disp: , Rfl:   •  gabapentin (NEURONTIN) 100 mg capsule, Take 1 capsule (100 mg total) by mouth nightly., Disp: 90 capsule, Rfl: 1  •  guaiFENesin (MUCINEX) 600 mg 12 hr tablet, Take 1 tablet (600 mg total) by mouth 2 (two) times a day., Disp: 60 tablet, Rfl: 0  •  hydrochlorothiazide (HYDRODIURIL) 25 mg tablet, Take 1 tablet (25 mg total) by mouth once daily., Disp: 90 tablet, Rfl: 1  •  lamoTRIgine (LaMICtal) 25 mg tablet, TAKE 1 TABLET AT BEDTIME FOR 2 WEEKS, THEN INCREASE TO 2 TABLETS AT BEDTIME, Disp: , Rfl:   •  lancets (freestyle) 28 gauge misc, 1 strip 2 (two) times a day., Disp: 100 each, Rfl: 3  •  lansoprazole 30 mg capsule, Take 1 capsule (30 mg total) by mouth daily before breakfast., Disp: 90 capsule, Rfl: 1  •  meloxicam (MOBIC) 15 mg tablet, TAKE 1 TABLET DAILY IF NEEDED WITH A FULL STOMACH, Disp: ,  Rfl:   •  metFORMIN (GLUCOPHAGE) 1,000 mg tablet, Take 1 tablet (1,000 mg total) by mouth 2 (two) times a day with meals. Increasing dose to 1000 mg. BID. Patient will use what she has, and call when she needs refills., Disp: 180 tablet, Rfl: 1  •  methylPREDNISolone (MEDROL DOSEPACK) 4 mg tablet, Follow package directions., Disp: 21 tablet, Rfl: 0  •  metoprolol succinate XL (TOPROL-XL) 100 mg 24 hr tablet, Take 1 tablet (100 mg total) by mouth daily., Disp: 90 tablet, Rfl: 1  •  olmesartan (BENICAR) 40 mg tablet, Take 1 tablet (40 mg total) by mouth daily., Disp: 90 tablet, Rfl: 1  •  oxyCODONE (ROXICODONE) 5 mg immediate release tablet, Take 1 tablet (5 mg total) by mouth every 4 (four) hours as needed for moderate pain., Disp: 180 tablet, Rfl: 0  •  oxyCODONE-acetaminophen (PERCOCET)  mg per tablet, Take 2 tablets by mouth., Disp: , Rfl:   •  SITagliptin (JANUVIA) 100 mg tablet, Take 1 tablet (100 mg total) by mouth daily., Disp: 90 tablet, Rfl: 1  •  triamcinolone (KENALOG) 0.1 % ointment, Apply 1 application topically 2 (two) times a day., Disp: 30 g, Rfl: 1      BP Readings from Last 3 Encounters:   03/17/22 140/76   03/02/22 130/82   01/27/22 140/82       Recent Lab results:  Lab Results   Component Value Date    CHOL 219 (H) 04/19/2021   ,   Lab Results   Component Value Date    HDL 44 04/19/2021   ,   Lab Results   Component Value Date    LDLCALC 126 (H) 04/19/2021   ,   Lab Results   Component Value Date    TRIG 278 (H) 04/19/2021        Lab Results   Component Value Date    GLUCOSE 397 (H) 04/19/2021   ,   Lab Results   Component Value Date    HGBA1C 8.2 (H) 04/19/2021         Lab Results   Component Value Date    CREATININE 0.68 04/19/2021       Lab Results   Component Value Date    TSH 4.890 (H) 12/30/2019

## 2022-03-29 ENCOUNTER — TELEPHONE (OUTPATIENT)
Dept: FAMILY MEDICINE | Facility: CLINIC | Age: 57
End: 2022-03-29

## 2022-03-29 RX ORDER — CEFPROZIL 500 MG/1
500 TABLET, FILM COATED ORAL 2 TIMES DAILY
Qty: 20 TABLET | Refills: 0 | Status: SHIPPED | OUTPATIENT
Start: 2022-03-29 | End: 2022-04-08

## 2022-03-29 NOTE — TELEPHONE ENCOUNTER
Discussed with marlys Schafer to Rx Cefzil 500 mgs BID x 10 days. Advised to call office if symptoms do not improve.

## 2022-03-29 NOTE — TELEPHONE ENCOUNTER
Patient contacted office with? Fever blister and cold   When did it start?  Fever? No   Headache? Yes   Nausea? No   Vomiting? No   Wheezing? Yes   Shortness of Breath? No   Congestion? yes  Cough? Yes   Have you tried any medicine? Pt stated she took ibuprofen   Please call [phone]

## 2022-04-02 ENCOUNTER — TELEPHONE (OUTPATIENT)
Dept: FAMILY MEDICINE | Facility: CLINIC | Age: 57
End: 2022-04-02

## 2022-04-02 RX ORDER — SULFAMETHOXAZOLE AND TRIMETHOPRIM 800; 160 MG/1; MG/1
1 TABLET ORAL 2 TIMES DAILY
Qty: 14 TABLET | Refills: 0 | Status: SHIPPED | OUTPATIENT
Start: 2022-04-02 | End: 2022-04-09

## 2022-04-02 NOTE — TELEPHONE ENCOUNTER
Patient called and said wound is worsening, purulent discharge, redness, painful. No fever. Taking cefzil and it continues to worsen. She is requesting bactrim because it worked for her in the past. Counseled patient on adverse effects She expressed understanding. If wound not resolved with bactrim patient needs in person visit.

## 2022-04-03 PROBLEM — R23.8 BLISTERS OF MULTIPLE SITES: Status: ACTIVE | Noted: 2022-04-03

## 2022-04-03 ASSESSMENT — ENCOUNTER SYMPTOMS
GASTROINTESTINAL NEGATIVE: 1
RESPIRATORY NEGATIVE: 1
NEUROLOGICAL NEGATIVE: 1
HEMATOLOGIC/LYMPHATIC NEGATIVE: 1
CARDIOVASCULAR NEGATIVE: 1
PSYCHIATRIC NEGATIVE: 1
ENDOCRINE NEGATIVE: 1
ALLERGIC/IMMUNOLOGIC NEGATIVE: 1
CONSTITUTIONAL NEGATIVE: 1
MUSCULOSKELETAL NEGATIVE: 1
EYES NEGATIVE: 1

## 2022-04-03 NOTE — PROGRESS NOTES
"  Subjective     Patient ID: Hilda Cornelius is a 56 y.o. female.    HPI here for evaluation of blisters    Review of Systems   Constitutional: Negative.    HENT: Negative.    Eyes: Negative.    Respiratory: Negative.    Cardiovascular: Negative.    Gastrointestinal: Negative.    Endocrine: Negative.    Genitourinary: Negative.    Musculoskeletal: Negative.    Skin: Negative.         Blisters along gluteal fold just under her right buttock and above umbilicus anterior abdomen   Allergic/Immunologic: Negative.    Neurological: Negative.    Hematological: Negative.    Psychiatric/Behavioral: Negative.        Objective     Vitals:    03/17/22 1832   BP: 140/76   BP Location: Left upper arm   Patient Position: Sitting   Pulse: 66   Resp: 16   Temp: 36.8 °C (98.2 °F)   TempSrc: Temporal   SpO2: 97%   Weight: 84.8 kg (187 lb)   Height: 1.676 m (5' 6\")     Body mass index is 30.18 kg/m².    Physical Exam  Vitals and nursing note reviewed.   Constitutional:       Appearance: She is well-developed.   HENT:      Head: Normocephalic and atraumatic.      Right Ear: External ear normal.      Left Ear: External ear normal.      Nose: Nose normal.   Eyes:      Conjunctiva/sclera: Conjunctivae normal.      Pupils: Pupils are equal, round, and reactive to light.   Cardiovascular:      Rate and Rhythm: Normal rate and regular rhythm.      Pulses: Normal pulses.      Heart sounds: Normal heart sounds.   Pulmonary:      Effort: Pulmonary effort is normal.      Breath sounds: Normal breath sounds.   Abdominal:      General: Bowel sounds are normal.      Palpations: Abdomen is soft.   Musculoskeletal:         General: Normal range of motion.      Cervical back: Normal range of motion and neck supple.   Skin:     General: Skin is warm and dry.      Capillary Refill: Capillary refill takes less than 2 seconds.      Comments: Blisters along gluteal fold just under her right buttock and above umbilicus anterior abdomen   Neurological:      " General: No focal deficit present.      Mental Status: She is alert and oriented to person, place, and time.   Psychiatric:         Mood and Affect: Mood normal.         Behavior: Behavior normal.         Thought Content: Thought content normal.         Judgment: Judgment normal.         Assessment/Plan   Diagnoses and all orders for this visit:    Blisters of multiple sites (Primary)  Assessment & Plan:  Patient with concern about several different blisters.  She has noticed about 3 days ago.  She has 2 blisters just below the right buttock on her upper thigh.  Near the gluteal fold.  Appears to be related to exposure to elastic in her underwear.  Blisters have broken area is slightly inflamed.  No fluctuance, mild erythema no signs of infection.  Patient also has 1 smaller blister just above her umbilicus, where the waistband of her underwear would go.  Reassured patient that these are not shingles.  Advised her to not wear this particular type of underwear.  Will treat with a Medrol Dosepak for the allergic reaction and some triamcinolone cream.  Patient will observe for signs and symptoms of infection and follow-up if those occur.      Other orders  -     triamcinolone (KENALOG) 0.1 % ointment; Apply 1 application topically 2 (two) times a day.  -     methylPREDNISolone (MEDROL DOSEPACK) 4 mg tablet; Follow package directions.

## 2022-04-03 NOTE — ASSESSMENT & PLAN NOTE
Patient with concern about several different blisters.  She has noticed about 3 days ago.  She has 2 blisters just below the right buttock on her upper thigh.  Near the gluteal fold.  Appears to be related to exposure to elastic in her underwear.  Blisters have broken area is slightly inflamed.  No fluctuance, mild erythema no signs of infection.  Patient also has 1 smaller blister just above her umbilicus, where the waistband of her underwear would go.  Reassured patient that these are not shingles.  Advised her to not wear this particular type of underwear.  Will treat with a Medrol Dosepak for the allergic reaction and some triamcinolone cream.  Patient will observe for signs and symptoms of infection and follow-up if those occur.

## 2022-04-04 DIAGNOSIS — M51.369 DEGENERATION OF LUMBAR INTERVERTEBRAL DISC: ICD-10-CM

## 2022-04-04 NOTE — TELEPHONE ENCOUNTER
Medicine Refill Request    Last Office: 3/17/2022   Last Consult Visit: Visit date not found  Last Telemedicine Visit: 1/25/2021 Emily Sullivan MD    Next Appointment: 4/28/2022      Current Outpatient Medications:   •  albuterol HFA (VENTOLIN HFA) 90 mcg/actuation inhaler, INHALE 2 PUFFS EVERY 6 HOURS AS NEEDED FOR WHEEZING, Disp: 8.5 each, Rfl: 1  •  ALPRAZolam (XANAX) 0.5 mg tablet, Take 2 tablets (1 mg total) by mouth 3 (three) times a day as needed for anxiety., Disp: 90 tablet, Rfl: 1  •  blood-glucose meter (PRECISION XTRA MONITOR) misc, 1 strip 2 (two) times a day., Disp: 1 each, Rfl: 0  •  cefprosil (CEFZIL) 500 mg tablet, Take 1 tablet (500 mg total) by mouth 2 (two) times a day for 10 days., Disp: 20 tablet, Rfl: 0  •  escitalopram (LEXAPRO) 20 mg tablet, Take 1 tablet (20 mg total) by mouth once daily., Disp: 30 tablet, Rfl: 3  •  fentaNYL (DURAGESIC) 12 mcg/hr, Place 1 patch on the skin every 3 (three) days. Use with 25 Mcg patch for total of 37 mcg., Disp: 10 patch, Rfl: 0  •  fentaNYL (DURAGESIC) 25 mcg/hr, Place 1 patch on the skin See admin instr. Apply 1 patch topically every 72 hours. Use with 12mcg, for 37 mcg total., Disp: 10 patch, Rfl: 0  •  FREESTYLE THOM 2 SENSOR kit, PLACE 1 DEVICE ON THE SKIN EVERY 2 WEEKS., Disp: , Rfl:   •  gabapentin (NEURONTIN) 100 mg capsule, Take 1 capsule (100 mg total) by mouth nightly., Disp: 90 capsule, Rfl: 1  •  hydrochlorothiazide (HYDRODIURIL) 25 mg tablet, Take 1 tablet (25 mg total) by mouth once daily., Disp: 30 tablet, Rfl: 3  •  lamoTRIgine (LaMICtal) 25 mg tablet, TAKE 1 TABLET AT BEDTIME FOR 2 WEEKS, THEN INCREASE TO 2 TABLETS AT BEDTIME, Disp: , Rfl:   •  lancets (freestyle) 28 gauge misc, 1 strip 2 (two) times a day., Disp: 100 each, Rfl: 3  •  lansoprazole 30 mg capsule, Take 1 capsule (30 mg total) by mouth daily before breakfast., Disp: 90 capsule, Rfl: 1  •  meloxicam (MOBIC) 15 mg tablet, TAKE 1 TABLET DAILY IF NEEDED WITH A FULL STOMACH,  Disp: , Rfl:   •  metFORMIN (GLUCOPHAGE) 1,000 mg tablet, Take 1 tablet (1,000 mg total) by mouth 2 (two) times a day with meals. Increasing dose to 1000 mg. BID. Patient will use what she has, and call when she needs refills., Disp: 180 tablet, Rfl: 1  •  metoprolol succinate XL (TOPROL-XL) 100 mg 24 hr tablet, Take 1 tablet (100 mg total) by mouth once daily., Disp: 30 tablet, Rfl: 3  •  olmesartan (BENICAR) 40 mg tablet, Take 1 tablet (40 mg total) by mouth once daily., Disp: 30 tablet, Rfl: 3  •  oxyCODONE (ROXICODONE) 5 mg immediate release tablet, Take 1 tablet (5 mg total) by mouth every 4 (four) hours as needed for moderate pain., Disp: 180 tablet, Rfl: 0  •  oxyCODONE-acetaminophen (PERCOCET)  mg per tablet, Take 2 tablets by mouth., Disp: , Rfl:   •  SITagliptin (JANUVIA) 100 mg tablet, Take 1 tablet (100 mg total) by mouth daily., Disp: 90 tablet, Rfl: 1  •  sulfamethoxazole-trimethoprim (BACTRIM DS) 800-160 mg per tablet, Take 1 tablet by mouth 2 (two) times a day for 7 days., Disp: 14 tablet, Rfl: 0  •  triamcinolone (KENALOG) 0.1 % ointment, Apply 1 application topically 2 (two) times a day., Disp: 30 g, Rfl: 1      BP Readings from Last 3 Encounters:   03/17/22 140/76   03/02/22 130/82   01/27/22 140/82       Recent Lab results:  Lab Results   Component Value Date    CHOL 219 (H) 04/19/2021   ,   Lab Results   Component Value Date    HDL 44 04/19/2021   ,   Lab Results   Component Value Date    LDLCALC 126 (H) 04/19/2021   ,   Lab Results   Component Value Date    TRIG 278 (H) 04/19/2021        Lab Results   Component Value Date    GLUCOSE 397 (H) 04/19/2021   ,   Lab Results   Component Value Date    HGBA1C 8.2 (H) 04/19/2021         Lab Results   Component Value Date    CREATININE 0.68 04/19/2021       Lab Results   Component Value Date    TSH 4.890 (H) 12/30/2019

## 2022-04-04 NOTE — TELEPHONE ENCOUNTER
Patient contacted office to request prescription refill:    Name of medication:fentaNYL   Strength of medication:12mcg  SIG:Place 1 patch on the skin every 3 (three) days  Quantity: 10 patches  Requested number refills:0  Preferred Pharmacy: [id number]  [place]   Pharmacy number:[phone]

## 2022-04-06 RX ORDER — FENTANYL 12.5 UG/1
1 PATCH TRANSDERMAL
Qty: 10 PATCH | Refills: 0 | Status: SHIPPED | OUTPATIENT
Start: 2022-04-06 | End: 2022-05-11 | Stop reason: SDUPTHER

## 2022-04-07 ENCOUNTER — TELEPHONE (OUTPATIENT)
Dept: FAMILY MEDICINE | Facility: CLINIC | Age: 57
End: 2022-04-07

## 2022-04-07 NOTE — TELEPHONE ENCOUNTER
Patient contacted office with? Bumps & itchy rash  When did it start? approx 3/24  Location: R posterior md thigh  Pt is a diabetic & was seen 3/17 w/Dr. Sullivan for woundcare.    Approx a week later noted bumps and itchiness and rash after removing tape over wound.    Please call.  Appt on 4/28 w/Dr. Sullivan.

## 2022-04-07 NOTE — TELEPHONE ENCOUNTER
RN spoke to patient. States wound had been improving with abx but now reporting itching, bumps where she had placed tape on R leg, back. States she's had both allergic reaction and yeast infection in the past. Agreeable to office visit.

## 2022-04-08 ENCOUNTER — OFFICE VISIT (OUTPATIENT)
Dept: FAMILY MEDICINE | Facility: CLINIC | Age: 57
End: 2022-04-08
Payer: COMMERCIAL

## 2022-04-08 VITALS
TEMPERATURE: 97.9 F | HEIGHT: 66 IN | BODY MASS INDEX: 29.57 KG/M2 | DIASTOLIC BLOOD PRESSURE: 70 MMHG | OXYGEN SATURATION: 95 % | HEART RATE: 60 BPM | WEIGHT: 184 LBS | RESPIRATION RATE: 16 BRPM | SYSTOLIC BLOOD PRESSURE: 110 MMHG

## 2022-04-08 DIAGNOSIS — F41.9 ANXIETY: ICD-10-CM

## 2022-04-08 DIAGNOSIS — R21 RASH: Primary | ICD-10-CM

## 2022-04-08 PROCEDURE — 99214 OFFICE O/P EST MOD 30 MIN: CPT | Performed by: INTERNAL MEDICINE

## 2022-04-08 PROCEDURE — 3074F SYST BP LT 130 MM HG: CPT | Performed by: INTERNAL MEDICINE

## 2022-04-08 PROCEDURE — 3008F BODY MASS INDEX DOCD: CPT | Performed by: INTERNAL MEDICINE

## 2022-04-08 PROCEDURE — 3078F DIAST BP <80 MM HG: CPT | Performed by: INTERNAL MEDICINE

## 2022-04-08 ASSESSMENT — PATIENT HEALTH QUESTIONNAIRE - PHQ9: SUM OF ALL RESPONSES TO PHQ9 QUESTIONS 1 & 2: 0

## 2022-04-18 DIAGNOSIS — M51.369 DEGENERATION OF LUMBAR INTERVERTEBRAL DISC: ICD-10-CM

## 2022-04-18 NOTE — TELEPHONE ENCOUNTER
Patient contacted office to request prescription refill:    Name of medication:  fentanyl  Strength of medication: 12 mcg  SIG: place 1 patch on the skin every 3 days  Quantity: 10  Requested number refills: 0    And    Fentanyl  25 mcg  Place 1 patch on skin every 72 hrs  10 patches  0 refill    And    Oxycodone  5 mg  Take 1 tablet every 4 hrs prn moderate pain  180 tabs  0 refills      Preferred Pharmacy: [facility]  Pharmacy number: [phone]    On 4/28 appt for video chat w/Dr. Sullivan

## 2022-04-19 RX ORDER — OXYCODONE AND ACETAMINOPHEN 10; 325 MG/1; MG/1
2 TABLET ORAL
Status: CANCELLED | OUTPATIENT
Start: 2022-04-19

## 2022-04-19 RX ORDER — OXYCODONE HYDROCHLORIDE 5 MG/1
5 TABLET ORAL EVERY 4 HOURS PRN
Qty: 180 TABLET | Refills: 0 | Status: SHIPPED | OUTPATIENT
Start: 2022-04-19 | End: 2022-05-20 | Stop reason: SDUPTHER

## 2022-04-19 RX ORDER — FENTANYL 12.5 UG/1
1 PATCH TRANSDERMAL
Qty: 10 PATCH | Refills: 0 | Status: CANCELLED | OUTPATIENT
Start: 2022-04-19 | End: 2022-05-19

## 2022-04-19 RX ORDER — FENTANYL 25 UG/1
1 PATCH TRANSDERMAL SEE ADMIN INSTRUCTIONS
Qty: 10 PATCH | Refills: 0 | Status: CANCELLED | OUTPATIENT
Start: 2022-04-19 | End: 2022-05-19

## 2022-04-19 NOTE — TELEPHONE ENCOUNTER
Medicine Refill Request    Last Office: 4/8/2022   Last Consult Visit: Visit date not found  Last Telemedicine Visit: 1/25/2021 Emily Sullivan MD    Next Appointment: 4/28/2022      Current Outpatient Medications:   •  albuterol HFA (VENTOLIN HFA) 90 mcg/actuation inhaler, INHALE 2 PUFFS EVERY 6 HOURS AS NEEDED FOR WHEEZING, Disp: 8.5 each, Rfl: 1  •  ALPRAZolam (XANAX) 0.5 mg tablet, Take 2 tablets (1 mg total) by mouth 3 (three) times a day as needed for anxiety., Disp: 90 tablet, Rfl: 1  •  blood-glucose meter (PRECISION XTRA MONITOR) misc, 1 strip 2 (two) times a day., Disp: 1 each, Rfl: 0  •  escitalopram (LEXAPRO) 20 mg tablet, Take 1 tablet (20 mg total) by mouth once daily., Disp: 30 tablet, Rfl: 3  •  fentaNYL (DURAGESIC) 12 mcg/hr, Place 1 patch on the skin every 3 (three) days. Use with 25 Mcg patch for total of 37 mcg., Disp: 10 patch, Rfl: 0  •  fentaNYL (DURAGESIC) 25 mcg/hr, Place 1 patch on the skin See admin instr. Apply 1 patch topically every 72 hours. Use with 12mcg, for 37 mcg total., Disp: 10 patch, Rfl: 0  •  FREESTYLE THOM 2 SENSOR kit, PLACE 1 DEVICE ON THE SKIN EVERY 2 WEEKS., Disp: , Rfl:   •  gabapentin (NEURONTIN) 100 mg capsule, Take 1 capsule (100 mg total) by mouth nightly., Disp: 90 capsule, Rfl: 1  •  hydrochlorothiazide (HYDRODIURIL) 25 mg tablet, Take 1 tablet (25 mg total) by mouth once daily., Disp: 30 tablet, Rfl: 3  •  lamoTRIgine (LaMICtal) 25 mg tablet, TAKE 1 TABLET AT BEDTIME FOR 2 WEEKS, THEN INCREASE TO 2 TABLETS AT BEDTIME, Disp: , Rfl:   •  lancets (freestyle) 28 gauge misc, 1 strip 2 (two) times a day., Disp: 100 each, Rfl: 3  •  lansoprazole (PREVACID) 30 mg capsule, Take 1 capsule (30 mg total) by mouth daily before breakfast., Disp: 30 capsule, Rfl: 5  •  meloxicam (MOBIC) 15 mg tablet, TAKE 1 TABLET DAILY IF NEEDED WITH A FULL STOMACH, Disp: , Rfl:   •  metFORMIN (GLUCOPHAGE) 1,000 mg tablet, Take 1 tablet (1,000 mg total) by mouth 2 (two) times a day with  meals. Increasing dose to 1000 mg. BID. Patient will use what she has, and call when she needs refills., Disp: 180 tablet, Rfl: 1  •  metoprolol succinate XL (TOPROL-XL) 100 mg 24 hr tablet, Take 1 tablet (100 mg total) by mouth once daily., Disp: 30 tablet, Rfl: 3  •  olmesartan (BENICAR) 40 mg tablet, Take 1 tablet (40 mg total) by mouth once daily., Disp: 30 tablet, Rfl: 3  •  oxyCODONE (ROXICODONE) 5 mg immediate release tablet, Take 1 tablet (5 mg total) by mouth every 4 (four) hours as needed for moderate pain., Disp: 180 tablet, Rfl: 0  •  oxyCODONE-acetaminophen (PERCOCET)  mg per tablet, Take 2 tablets by mouth., Disp: , Rfl:   •  SITagliptin (JANUVIA) 100 mg tablet, Take 1 tablet (100 mg total) by mouth daily., Disp: 90 tablet, Rfl: 1  •  triamcinolone (KENALOG) 0.1 % ointment, Apply 1 application topically 2 (two) times a day., Disp: 30 g, Rfl: 1      BP Readings from Last 3 Encounters:   04/08/22 110/70   03/17/22 140/76   03/02/22 130/82       Recent Lab results:  Lab Results   Component Value Date    CHOL 219 (H) 04/19/2021   ,   Lab Results   Component Value Date    HDL 44 04/19/2021   ,   Lab Results   Component Value Date    LDLCALC 126 (H) 04/19/2021   ,   Lab Results   Component Value Date    TRIG 278 (H) 04/19/2021        Lab Results   Component Value Date    GLUCOSE 397 (H) 04/19/2021   ,   Lab Results   Component Value Date    HGBA1C 8.2 (H) 04/19/2021         Lab Results   Component Value Date    CREATININE 0.68 04/19/2021       Lab Results   Component Value Date    TSH 4.890 (H) 12/30/2019

## 2022-04-19 NOTE — TELEPHONE ENCOUNTER
Fentanyl 12 mcg/hr not due until 5/5.   Fentanyl 25 mcg/ hr not due until 4/22  Will fill oxycodone today.   Chart and PDMP reviewed. Patient is due, will refill.

## 2022-04-27 RX ORDER — FENTANYL 25 UG/1
1 PATCH TRANSDERMAL SEE ADMIN INSTRUCTIONS
Qty: 10 PATCH | Refills: 0 | Status: SHIPPED | OUTPATIENT
Start: 2022-04-27 | End: 2022-06-14 | Stop reason: SDUPTHER

## 2022-04-27 NOTE — TELEPHONE ENCOUNTER
Patient contacted office to request prescription refill:    Name of medication: fentanyl   Strength of medication:25mcg   SIG:place 1 patch on skin   Quantity: 10 patch   Requested number refills: 0  Preferred Pharmacy: [facility] / pharmacy #[id number]  [place]   Pharmacy number: [phone]

## 2022-05-11 DIAGNOSIS — M51.369 DEGENERATION OF LUMBAR INTERVERTEBRAL DISC: ICD-10-CM

## 2022-05-11 RX ORDER — FENTANYL 12.5 UG/1
1 PATCH TRANSDERMAL
Qty: 10 PATCH | Refills: 0 | Status: SHIPPED | OUTPATIENT
Start: 2022-05-11 | End: 2022-06-14 | Stop reason: SDUPTHER

## 2022-05-11 RX ORDER — FENTANYL 12.5 UG/1
1 PATCH TRANSDERMAL
Qty: 10 PATCH | Refills: 0 | Status: CANCELLED | OUTPATIENT
Start: 2022-05-11 | End: 2022-06-10

## 2022-05-11 NOTE — TELEPHONE ENCOUNTER
Chart and PDMP reviewed. Patient is due, will refill.   25 mcg ws filled 4/27.   12 mcg filled 4/6. Will fill 12 mcg patch

## 2022-05-11 NOTE — TELEPHONE ENCOUNTER
Patient contacted office to request prescription refill:    Name of medication: fentanyl   Strength of medication: 12 mcg   SIG: place 1 patch on the skin every 3 days   Quantity: 10  Requested number refills: 0   Preferred Pharmacy: [facility] / pharmacy @[address] [place] [address]  Pharmacy number:[phone]

## 2022-05-15 PROBLEM — R21 RASH: Status: ACTIVE | Noted: 2022-05-15

## 2022-05-15 ASSESSMENT — ENCOUNTER SYMPTOMS
RESPIRATORY NEGATIVE: 1
HEMATOLOGIC/LYMPHATIC NEGATIVE: 1
CONSTITUTIONAL NEGATIVE: 1
CARDIOVASCULAR NEGATIVE: 1
MUSCULOSKELETAL NEGATIVE: 1
ENDOCRINE NEGATIVE: 1
GASTROINTESTINAL NEGATIVE: 1
PSYCHIATRIC NEGATIVE: 1
EYES NEGATIVE: 1
NEUROLOGICAL NEGATIVE: 1
ALLERGIC/IMMUNOLOGIC NEGATIVE: 1

## 2022-05-15 NOTE — PROGRESS NOTES
"  Subjective     Patient ID: Hilda Cornelius is a 56 y.o. female.    HPI here for follow-up of rash on buttocks and abdomen.    Review of Systems   Constitutional: Negative.    HENT: Negative.    Eyes: Negative.    Respiratory: Negative.    Cardiovascular: Negative.    Gastrointestinal: Negative.    Endocrine: Negative.    Genitourinary: Negative.    Musculoskeletal: Negative.    Skin: Positive for rash.        3 separate areas to just below the buttocks on the right and 1 just above the umbilicus on the abdomen.   Allergic/Immunologic: Negative.    Neurological: Negative.    Hematological: Negative.    Psychiatric/Behavioral: Negative.        Objective     Vitals:    04/08/22 1538   BP: 110/70   BP Location: Left upper arm   Patient Position: Sitting   Pulse: 60   Resp: 16   Temp: 36.6 °C (97.9 °F)   TempSrc: Oral   SpO2: 95%   Weight: 83.5 kg (184 lb)   Height: 1.676 m (5' 6\")     Body mass index is 29.7 kg/m².    Physical Exam  Vitals and nursing note reviewed.   Constitutional:       Appearance: She is well-developed.   HENT:      Head: Normocephalic and atraumatic.      Right Ear: External ear normal.      Left Ear: External ear normal.      Nose: Nose normal.   Eyes:      Conjunctiva/sclera: Conjunctivae normal.      Pupils: Pupils are equal, round, and reactive to light.   Cardiovascular:      Rate and Rhythm: Normal rate and regular rhythm.      Pulses: Normal pulses.      Heart sounds: Normal heart sounds.   Pulmonary:      Effort: Pulmonary effort is normal.      Breath sounds: Normal breath sounds.   Abdominal:      General: Bowel sounds are normal.      Palpations: Abdomen is soft.   Musculoskeletal:         General: Normal range of motion.      Cervical back: Normal range of motion and neck supple.   Skin:     General: Skin is warm and dry.      Capillary Refill: Capillary refill takes less than 2 seconds.      Findings: Rash present.      Comments: 2 areas just below gluteal fold on right upper thigh.  " Approximately 0.25 cm wide by about 2 and half centimeters long.  No drainage, mild erythema, no warmth.   Neurological:      General: No focal deficit present.      Mental Status: She is alert and oriented to person, place, and time.      Cranial Nerves: No cranial nerve deficit.   Psychiatric:         Mood and Affect: Mood normal.         Behavior: Behavior normal.         Thought Content: Thought content normal.         Judgment: Judgment normal.         Assessment/Plan   Diagnoses and all orders for this visit:    Rash (Primary)  Assessment & Plan:  Patient returns for follow-up of her rash.  2 areas on her posterior thigh and one just above her umbilicus.  Erythema is improved since last evaluation.  No longer resemble blisters.  Not along any kind of nerve root or dermatome.  Still resembles contact irritation likely related to elastic in underwear.  Recommend using Neosporin to help it heal.  Covering with a nonstick Telfa gauze if needed.  Keep it from being irritated by her close.  Follow-up if it does not continue to improve.      Anxiety  Assessment & Plan:  Patient is on escitalopram 20 mg daily.  Alprazolam 0.5 mg 3 times a day.  Patient is also on a large amount of narcotics for chronic pain.  Patient is aware of increased risk with benzodiazepines and narcotics.  PDMP is reviewed.  Patient takes as prescribed.  Spent at least 20 minutes speaking with patient in regards to her anxiety and her chronic pain.  At least half was spent in education.

## 2022-05-15 NOTE — ASSESSMENT & PLAN NOTE
Patient is on escitalopram 20 mg daily.  Alprazolam 0.5 mg 3 times a day.  Patient is also on a large amount of narcotics for chronic pain.  Patient is aware of increased risk with benzodiazepines and narcotics.  PDMP is reviewed.  Patient takes as prescribed.  Spent at least 20 minutes speaking with patient in regards to her anxiety and her chronic pain.  At least half was spent in education.

## 2022-05-15 NOTE — ASSESSMENT & PLAN NOTE
Patient returns for follow-up of her rash.  2 areas on her posterior thigh and one just above her umbilicus.  Erythema is improved since last evaluation.  No longer resemble blisters.  Not along any kind of nerve root or dermatome.  Still resembles contact irritation likely related to elastic in underwear.  Recommend using Neosporin to help it heal.  Covering with a nonstick Telfa gauze if needed.  Keep it from being irritated by her close.  Follow-up if it does not continue to improve.

## 2022-05-16 NOTE — TELEPHONE ENCOUNTER
Patient contacted office to request prescription refill:    Name of medication: oxycodone  Strength of medication: 5 mg  SIG: take 1 tab by mouth every 4 hrs prn mod pain  Quantity: 180  Requested number refills: 0    Preferred Pharmacy: [facility]  Pharmacy number:  [phone]

## 2022-05-18 DIAGNOSIS — F41.9 ANXIETY: ICD-10-CM

## 2022-05-18 NOTE — TELEPHONE ENCOUNTER
Patient contacted office to request prescription refill:  FORGOT TO ASK FOR THIS ONE  Name of medication: Alprazolam  Strength of medication: 0.5 mg  SIG: take 1 tab 3x/day prn anxiety  Quantity: 90  Requested number refills: 1    Preferred Pharmacy: [facility]  Pharmacy number: [phone]

## 2022-05-18 NOTE — TELEPHONE ENCOUNTER
Medicine Refill Request    Last Office: 4/8/2022   Last Consult Visit: Visit date not found  Last Telemedicine Visit: 1/25/2021 Emily Sullivan MD    Next Appointment: 7/14/2022      Current Outpatient Medications:   •  albuterol HFA (VENTOLIN HFA) 90 mcg/actuation inhaler, TAKE 2 PUFFS BY MOUTH EVERY 6 HOURS AS NEEDED FOR WHEEZE, Disp: 8.5 each, Rfl: 3  •  ALPRAZolam (XANAX) 0.5 mg tablet, Take 2 tablets (1 mg total) by mouth 3 (three) times a day as needed for anxiety., Disp: 90 tablet, Rfl: 1  •  blood-glucose meter (PRECISION XTRA MONITOR) misc, 1 strip 2 (two) times a day., Disp: 1 each, Rfl: 0  •  escitalopram (LEXAPRO) 20 mg tablet, Take 1 tablet (20 mg total) by mouth once daily., Disp: 30 tablet, Rfl: 3  •  fentaNYL (DURAGESIC) 12 mcg/hr, Place 1 patch on the skin every 3 (three) days. Use with 25 Mcg patch for total of 37 mcg., Disp: 10 patch, Rfl: 0  •  fentaNYL (DURAGESIC) 25 mcg/hr, Place 1 patch on the skin See admin instr. Apply 1 patch topically every 72 hours. Use with 12mcg, for 37 mcg total., Disp: 10 patch, Rfl: 0  •  FREESTYLE THOM 2 SENSOR kit, PLACE 1 DEVICE ON THE SKIN EVERY 2 WEEKS., Disp: , Rfl:   •  gabapentin (NEURONTIN) 100 mg capsule, Take 1 capsule (100 mg total) by mouth nightly., Disp: 90 capsule, Rfl: 1  •  hydrochlorothiazide (HYDRODIURIL) 25 mg tablet, Take 1 tablet (25 mg total) by mouth once daily., Disp: 30 tablet, Rfl: 3  •  lamoTRIgine (LaMICtal) 25 mg tablet, TAKE 1 TABLET AT BEDTIME FOR 2 WEEKS, THEN INCREASE TO 2 TABLETS AT BEDTIME, Disp: , Rfl:   •  lancets (freestyle) 28 gauge misc, 1 strip 2 (two) times a day., Disp: 100 each, Rfl: 3  •  lansoprazole (PREVACID) 30 mg capsule, Take 1 capsule (30 mg total) by mouth daily before breakfast., Disp: 30 capsule, Rfl: 5  •  meloxicam (MOBIC) 15 mg tablet, TAKE 1 TABLET DAILY IF NEEDED WITH A FULL STOMACH, Disp: , Rfl:   •  metFORMIN (GLUCOPHAGE) 1,000 mg tablet, Take 1 tablet (1,000 mg total) by mouth 2 (two) times a day  with meals. Increasing dose to 1000 mg. BID. Patient will use what she has, and call when she needs refills., Disp: 180 tablet, Rfl: 1  •  metoprolol succinate XL (TOPROL-XL) 100 mg 24 hr tablet, Take 1 tablet (100 mg total) by mouth once daily., Disp: 30 tablet, Rfl: 3  •  olmesartan (BENICAR) 40 mg tablet, Take 1 tablet (40 mg total) by mouth once daily., Disp: 30 tablet, Rfl: 3  •  oxyCODONE (ROXICODONE) 5 mg immediate release tablet, Take 1 tablet (5 mg total) by mouth every 4 (four) hours as needed for moderate pain., Disp: 180 tablet, Rfl: 0  •  SITagliptin (JANUVIA) 100 mg tablet, Take 1 tablet (100 mg total) by mouth daily., Disp: 90 tablet, Rfl: 1  •  triamcinolone (KENALOG) 0.1 % ointment, Apply 1 application topically 2 (two) times a day., Disp: 30 g, Rfl: 1      BP Readings from Last 3 Encounters:   04/08/22 110/70   03/17/22 140/76   03/02/22 130/82       Recent Lab results:  Lab Results   Component Value Date    CHOL 219 (H) 04/19/2021   ,   Lab Results   Component Value Date    HDL 44 04/19/2021   ,   Lab Results   Component Value Date    LDLCALC 126 (H) 04/19/2021   ,   Lab Results   Component Value Date    TRIG 278 (H) 04/19/2021        Lab Results   Component Value Date    GLUCOSE 397 (H) 04/19/2021   ,   Lab Results   Component Value Date    HGBA1C 8.2 (H) 04/19/2021         Lab Results   Component Value Date    CREATININE 0.68 04/19/2021       Lab Results   Component Value Date    TSH 4.890 (H) 12/30/2019

## 2022-05-19 RX ORDER — ALPRAZOLAM 0.5 MG/1
1 TABLET ORAL 3 TIMES DAILY PRN
Qty: 90 TABLET | Refills: 1 | Status: SHIPPED | OUTPATIENT
Start: 2022-05-19 | End: 2022-07-19 | Stop reason: SDUPTHER

## 2022-05-20 RX ORDER — OXYCODONE HYDROCHLORIDE 5 MG/1
5 TABLET ORAL EVERY 4 HOURS PRN
Qty: 180 TABLET | Refills: 0 | Status: SHIPPED | OUTPATIENT
Start: 2022-05-20 | End: 2022-06-20 | Stop reason: SDUPTHER

## 2022-06-13 DIAGNOSIS — M51.369 DEGENERATION OF LUMBAR INTERVERTEBRAL DISC: ICD-10-CM

## 2022-06-13 NOTE — TELEPHONE ENCOUNTER
Patient contacted office to request prescription refill:    Name of medication: Fentanyl   Strength of medication: 12 mcg/hr  SIG: place 1 patch on skin every 3 days  Use with 25 mcg patch for total of 37 mcg  Quantity: 10 patch  Requested number refills:    And    Fentanyl  25 mcg/hour  Place 1 patch on skin   Apply 1 patch every 72 hours  Use with 12 mcg for total of 37 mcg  10 patch        Preferred Pharmacy:[facility]  Pharmacy number: [phone]

## 2022-06-14 RX ORDER — FENTANYL 25 UG/1
1 PATCH TRANSDERMAL SEE ADMIN INSTRUCTIONS
Qty: 10 PATCH | Refills: 0 | Status: SHIPPED | OUTPATIENT
Start: 2022-06-14 | End: 2022-07-19 | Stop reason: SDUPTHER

## 2022-06-14 RX ORDER — FENTANYL 12.5 UG/1
1 PATCH TRANSDERMAL
Qty: 10 PATCH | Refills: 0 | Status: SHIPPED | OUTPATIENT
Start: 2022-06-14 | End: 2022-07-19 | Stop reason: SDUPTHER

## 2022-06-20 RX ORDER — OXYCODONE HYDROCHLORIDE 5 MG/1
5 TABLET ORAL EVERY 4 HOURS PRN
Qty: 180 TABLET | Refills: 0 | Status: SHIPPED | OUTPATIENT
Start: 2022-06-20 | End: 2022-07-19 | Stop reason: SDUPTHER

## 2022-07-13 NOTE — TELEPHONE ENCOUNTER
Patient has complaints of difficulty passing her urine starting now early this morning.  She noticed that it had blood-tinged urine it was pink in color.  But also dark.  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  She does have an appointment with urology and a referral is placed for that.    There is ultrasound scan dated 7/18/2019.  That shows a structure at the base of the right kidney.  Will fax thtat ultrasound result with referral  
Pt is having a little discomfort when going to bathroom. Pt stated she is having pinkish discolor as well. I did refer pt to ER but pt insisted a call from you before going. Pt can be reached at   
Statement Selected

## 2022-07-19 DIAGNOSIS — M51.369 DEGENERATION OF LUMBAR INTERVERTEBRAL DISC: ICD-10-CM

## 2022-07-19 DIAGNOSIS — F41.9 ANXIETY: ICD-10-CM

## 2022-07-19 RX ORDER — METOPROLOL SUCCINATE 100 MG/1
TABLET, EXTENDED RELEASE ORAL
Qty: 30 TABLET | Refills: 3 | Status: SHIPPED | OUTPATIENT
Start: 2022-07-19 | End: 2023-07-20 | Stop reason: SDUPTHER

## 2022-07-19 RX ORDER — OXYCODONE HYDROCHLORIDE 5 MG/1
5 TABLET ORAL EVERY 4 HOURS PRN
Qty: 180 TABLET | Refills: 0 | Status: SHIPPED | OUTPATIENT
Start: 2022-07-22 | End: 2022-11-07

## 2022-07-19 RX ORDER — FENTANYL 12.5 UG/1
1 PATCH TRANSDERMAL
Qty: 10 PATCH | Refills: 0 | Status: SHIPPED | OUTPATIENT
Start: 2022-07-19 | End: 2022-11-07

## 2022-07-19 RX ORDER — FENTANYL 25 UG/1
1 PATCH TRANSDERMAL SEE ADMIN INSTRUCTIONS
Qty: 10 PATCH | Refills: 0 | Status: SHIPPED | OUTPATIENT
Start: 2022-07-19 | End: 2024-05-20

## 2022-07-19 RX ORDER — ALPRAZOLAM 0.5 MG/1
1 TABLET ORAL 3 TIMES DAILY PRN
Qty: 90 TABLET | Refills: 1 | Status: SHIPPED | OUTPATIENT
Start: 2022-07-19 | End: 2022-09-15 | Stop reason: SDUPTHER

## 2022-07-19 NOTE — TELEPHONE ENCOUNTER
Medicine Refill Request    Last Office: 4/8/2022   Last Consult Visit: Visit date not found  Last Telemedicine Visit: 1/25/2021 Emily Sullivan MD    Next Appointment: 7/21/2022      Current Outpatient Medications:   •  albuterol HFA (VENTOLIN HFA) 90 mcg/actuation inhaler, TAKE 2 PUFFS BY MOUTH EVERY 6 HOURS AS NEEDED FOR WHEEZE, Disp: 8.5 each, Rfl: 3  •  ALPRAZolam (XANAX) 0.5 mg tablet, Take 2 tablets (1 mg total) by mouth 3 (three) times a day as needed for anxiety., Disp: 90 tablet, Rfl: 1  •  blood-glucose meter (PRECISION XTRA MONITOR) misc, 1 strip 2 (two) times a day., Disp: 1 each, Rfl: 0  •  escitalopram (LEXAPRO) 20 mg tablet, TAKE 1 TABLET BY MOUTH EVERY DAY, Disp: 30 tablet, Rfl: 3  •  fentaNYL (DURAGESIC) 12 mcg/hr, Place 1 patch on the skin every 3 (three) days. Use with 25 Mcg patch for total of 37 mcg., Disp: 10 patch, Rfl: 0  •  fentaNYL (DURAGESIC) 25 mcg/hr, Place 1 patch on the skin See admin instr. Apply 1 patch topically every 72 hours. Use with 12mcg, for 37 mcg total., Disp: 10 patch, Rfl: 0  •  FREESTYLE THOM 2 SENSOR kit, PLACE 1 DEVICE ON THE SKIN EVERY 2 WEEKS., Disp: , Rfl:   •  gabapentin (NEURONTIN) 100 mg capsule, Take 1 capsule (100 mg total) by mouth nightly., Disp: 90 capsule, Rfl: 1  •  hydrochlorothiazide (HYDRODIURIL) 25 mg tablet, TAKE 1 TABLET BY MOUTH EVERY DAY, Disp: 30 tablet, Rfl: 3  •  lamoTRIgine (LaMICtal) 25 mg tablet, TAKE 1 TABLET AT BEDTIME FOR 2 WEEKS, THEN INCREASE TO 2 TABLETS AT BEDTIME, Disp: , Rfl:   •  lancets (freestyle) 28 gauge misc, 1 strip 2 (two) times a day., Disp: 100 each, Rfl: 3  •  lansoprazole (PREVACID) 30 mg capsule, Take 1 capsule (30 mg total) by mouth daily before breakfast., Disp: 30 capsule, Rfl: 5  •  meloxicam (MOBIC) 15 mg tablet, TAKE 1 TABLET DAILY IF NEEDED WITH A FULL STOMACH, Disp: , Rfl:   •  metFORMIN (GLUCOPHAGE) 1,000 mg tablet, Take 1 tablet (1,000 mg total) by mouth 2 (two) times a day with meals. Increasing dose to  1000 mg. BID. Patient will use what she has, and call when she needs refills., Disp: 180 tablet, Rfl: 1  •  metoprolol succinate XL (TOPROL-XL) 100 mg 24 hr tablet, Take 1 tablet (100 mg total) by mouth once daily., Disp: 30 tablet, Rfl: 3  •  olmesartan (BENICAR) 40 mg tablet, TAKE 1 TABLET BY MOUTH EVERY DAY, Disp: 30 tablet, Rfl: 3  •  oxyCODONE (ROXICODONE) 5 mg immediate release tablet, Take 1 tablet (5 mg total) by mouth every 4 (four) hours as needed for moderate pain., Disp: 180 tablet, Rfl: 0  •  SITagliptin (JANUVIA) 100 mg tablet, Take 1 tablet (100 mg total) by mouth daily., Disp: 90 tablet, Rfl: 1  •  triamcinolone (KENALOG) 0.1 % ointment, Apply 1 application topically 2 (two) times a day., Disp: 30 g, Rfl: 1      BP Readings from Last 3 Encounters:   04/08/22 110/70   03/17/22 140/76   03/02/22 130/82       Recent Lab results:  Lab Results   Component Value Date    CHOL 219 (H) 04/19/2021   ,   Lab Results   Component Value Date    HDL 44 04/19/2021   ,   Lab Results   Component Value Date    LDLCALC 126 (H) 04/19/2021   ,   Lab Results   Component Value Date    TRIG 278 (H) 04/19/2021        Lab Results   Component Value Date    GLUCOSE 397 (H) 04/19/2021   ,   Lab Results   Component Value Date    HGBA1C 8.2 (H) 04/19/2021         Lab Results   Component Value Date    CREATININE 0.68 04/19/2021       Lab Results   Component Value Date    TSH 4.890 (H) 12/30/2019

## 2022-07-20 ENCOUNTER — TELEPHONE (OUTPATIENT)
Dept: FAMILY MEDICINE | Facility: CLINIC | Age: 57
End: 2022-07-20
Payer: COMMERCIAL

## 2022-07-21 ENCOUNTER — OFFICE VISIT (OUTPATIENT)
Dept: FAMILY MEDICINE | Facility: CLINIC | Age: 57
End: 2022-07-21
Payer: COMMERCIAL

## 2022-07-21 VITALS
HEIGHT: 66 IN | SYSTOLIC BLOOD PRESSURE: 140 MMHG | BODY MASS INDEX: 29.73 KG/M2 | TEMPERATURE: 97.3 F | DIASTOLIC BLOOD PRESSURE: 90 MMHG | OXYGEN SATURATION: 97 % | WEIGHT: 185 LBS | HEART RATE: 87 BPM | RESPIRATION RATE: 16 BRPM

## 2022-07-21 DIAGNOSIS — E11.9 TYPE 2 DIABETES MELLITUS WITHOUT COMPLICATION, WITHOUT LONG-TERM CURRENT USE OF INSULIN (CMS/HCC): ICD-10-CM

## 2022-07-21 DIAGNOSIS — Z12.31 ENCOUNTER FOR SCREENING MAMMOGRAM FOR MALIGNANT NEOPLASM OF BREAST: ICD-10-CM

## 2022-07-21 DIAGNOSIS — F11.90 CHRONIC, CONTINUOUS USE OF OPIOIDS: Primary | ICD-10-CM

## 2022-07-21 PROCEDURE — 3077F SYST BP >= 140 MM HG: CPT | Performed by: INTERNAL MEDICINE

## 2022-07-21 PROCEDURE — 3008F BODY MASS INDEX DOCD: CPT | Performed by: INTERNAL MEDICINE

## 2022-07-21 PROCEDURE — 99213 OFFICE O/P EST LOW 20 MIN: CPT | Performed by: INTERNAL MEDICINE

## 2022-07-21 PROCEDURE — 3080F DIAST BP >= 90 MM HG: CPT | Performed by: INTERNAL MEDICINE

## 2022-07-26 ENCOUNTER — HOSPITAL ENCOUNTER (OUTPATIENT)
Dept: RADIOLOGY | Age: 57
Discharge: HOME | End: 2022-07-26
Attending: INTERNAL MEDICINE
Payer: COMMERCIAL

## 2022-07-26 ENCOUNTER — HOSPITAL ENCOUNTER (OUTPATIENT)
Dept: RADIOLOGY | Age: 57
Discharge: HOME | End: 2022-07-26
Attending: RADIOLOGY
Payer: COMMERCIAL

## 2022-07-26 DIAGNOSIS — Z12.31 ENCOUNTER FOR SCREENING MAMMOGRAM FOR MALIGNANT NEOPLASM OF BREAST: ICD-10-CM

## 2022-07-26 PROCEDURE — G0279 TOMOSYNTHESIS, MAMMO: HCPCS

## 2022-07-26 PROCEDURE — 76642 ULTRASOUND BREAST LIMITED: CPT | Mod: 50

## 2022-07-27 ENCOUNTER — TELEPHONE (OUTPATIENT)
Dept: FAMILY MEDICINE | Facility: CLINIC | Age: 57
End: 2022-07-27
Payer: COMMERCIAL

## 2022-07-27 LAB
AMPHETAMINES UR QL SCN: NEGATIVE NG/ML
BARBITURATES UR QL SCN: NEGATIVE NG/ML
BENZODIAZ UR QL: NEGATIVE
BZE UR QL: NEGATIVE NG/ML
CANNABINOIDS UR QL SCN: NEGATIVE NG/ML
OPIATES UR QL: NEGATIVE NG/ML
OXYCODONE UR CFM-MCNC: 522 NG/ML
OXYCODONE UR QL CFM: POSITIVE
OXYCODONE+OXYMORPHONE UR QL SCN: NORMAL NG/ML
OXYCODONE+OXYMORPHONE UR QL SCN: POSITIVE
OXYMORPHONE UR QL CFM: NEGATIVE
PCP UR QL: NEGATIVE NG/ML

## 2022-07-27 NOTE — TELEPHONE ENCOUNTER
Called yessica and told her all three medication were approved and to call the pharmacy fill her order.

## 2022-07-27 NOTE — TELEPHONE ENCOUNTER
Started prior authorization for fentanyl 25mcg and 12mcg patches and oxycodone waiting for the insurance company to reply

## 2022-08-02 PROBLEM — F11.90 CHRONIC, CONTINUOUS USE OF OPIOIDS: Status: ACTIVE | Noted: 2022-08-02

## 2022-08-02 ASSESSMENT — ENCOUNTER SYMPTOMS
RESPIRATORY NEGATIVE: 1
MYALGIAS: 1
GASTROINTESTINAL NEGATIVE: 1
ALLERGIC/IMMUNOLOGIC NEGATIVE: 1
CONSTITUTIONAL NEGATIVE: 1
CARDIOVASCULAR NEGATIVE: 1
BACK PAIN: 1
PSYCHIATRIC NEGATIVE: 1
ARTHRALGIAS: 1
NECK PAIN: 1
ENDOCRINE NEGATIVE: 1
NEUROLOGICAL NEGATIVE: 1
HEMATOLOGIC/LYMPHATIC NEGATIVE: 1
EYES NEGATIVE: 1
JOINT SWELLING: 1

## 2022-08-02 NOTE — ASSESSMENT & PLAN NOTE
Patient reports she really does not like mammograms.  But she is willing to get one done for breast cancer screening.

## 2022-08-02 NOTE — ASSESSMENT & PLAN NOTE
Patient has a history of diabetes.  Not completely controlled at this time.  She is following with endocrinology.  Patient reports endocrinologist suggested that she get ophthalmology exam, and a podiatry exam.  Patient reports that she does not have money to pay for these exams.  I did explain that these are recommended for complete follow-up of her diabetes.  Patient reports that she will try to arrange for these exams.  We will order medications for this patient at this time.

## 2022-08-02 NOTE — PROGRESS NOTES
"  Subjective     Patient ID: Hilda Cornelius is a 57 y.o. female.    HPI Here for follow up chronic pain and DM    Review of Systems   Constitutional: Negative.    HENT: Negative.    Eyes: Negative.    Respiratory: Negative.    Cardiovascular: Negative.    Gastrointestinal: Negative.    Endocrine: Negative.    Genitourinary: Negative.    Musculoskeletal: Positive for arthralgias, back pain, joint swelling, myalgias and neck pain.   Skin: Negative.    Allergic/Immunologic: Negative.    Neurological: Negative.    Hematological: Negative.    Psychiatric/Behavioral: Negative.        Objective     Vitals:    07/21/22 1823   BP: 140/90   BP Location: Left upper arm   Patient Position: Sitting   Pulse: 87   Resp: 16   Temp: 36.3 °C (97.3 °F)   TempSrc: Oral   SpO2: 97%   Weight: 83.9 kg (185 lb)   Height: 1.676 m (5' 6\")     Body mass index is 29.86 kg/m².    Physical Exam  Vitals and nursing note reviewed.   Constitutional:       Appearance: Normal appearance. She is well-developed. She is obese.   HENT:      Head: Normocephalic and atraumatic.      Right Ear: External ear normal.      Left Ear: External ear normal.      Nose: Nose normal.   Eyes:      Conjunctiva/sclera: Conjunctivae normal.      Pupils: Pupils are equal, round, and reactive to light.   Cardiovascular:      Rate and Rhythm: Normal rate and regular rhythm.      Pulses: Normal pulses.      Heart sounds: Normal heart sounds. No murmur heard.    No gallop.   Pulmonary:      Effort: Pulmonary effort is normal.      Breath sounds: Normal breath sounds.   Abdominal:      General: Bowel sounds are normal.      Palpations: Abdomen is soft.   Musculoskeletal:         General: Tenderness present. Normal range of motion.      Cervical back: Normal range of motion and neck supple.   Skin:     General: Skin is warm and dry.      Capillary Refill: Capillary refill takes less than 2 seconds.   Neurological:      General: No focal deficit present.      Mental Status: She " is alert and oriented to person, place, and time.      Cranial Nerves: No cranial nerve deficit.   Psychiatric:         Mood and Affect: Mood normal.         Behavior: Behavior normal.         Thought Content: Thought content normal.         Judgment: Judgment normal.         Assessment/Plan   Diagnoses and all orders for this visit:    Chronic, continuous use of opioids (Primary)  Assessment & Plan:  Continue to discuss with patient the extensiveness of her chronic opioid use.  Patient does not feel at this time that she can cut down due to her pain.  Patient has had injections by pain management doctor.  She will discuss with them to see if they are able to take over her chronic opioid prescriptions.  Patient is due for urine tox today.  And we will renew her pain management contract.  Patient reports she will talk with her pain management doctor which is [facility] and see if they are willing to take over prescriptions of her large amounts of opioids.    Orders:  -     Oxycodone, urine  -     Drug screen panel, urine    Encounter for screening mammogram for malignant neoplasm of breast  Assessment & Plan:  Patient reports she really does not like mammograms.  But she is willing to get one done for breast cancer screening.      Type 2 diabetes mellitus without complication, without long-term current use of insulin (CMS/[facility])  Assessment & Plan:  Patient has a history of diabetes.  Not completely controlled at this time.  She is following with endocrinology.  Patient reports endocrinologist suggested that she get ophthalmology exam, and a podiatry exam.  Patient reports that she does not have money to pay for these exams.  I did explain that these are recommended for complete follow-up of her diabetes.  Patient reports that she will try to arrange for these exams.  We will order medications for this patient at this time.    Orders:  -     CBC and Differential; Future  -     Comprehensive metabolic panel;  Future  -     Lipid panel; Future  -     Hemoglobin A1c; Future  -     Microalbumin/Creatinine Ur Random; Future    Other orders  -     OXYCODONE/OXYMORPHONE [facility] REFLEX

## 2022-08-02 NOTE — ASSESSMENT & PLAN NOTE
Continue to discuss with patient the extensiveness of her chronic opioid use.  Patient does not feel at this time that she can cut down due to her pain.  Patient has had injections by pain management doctor.  She will discuss with them to see if they are able to take over her chronic opioid prescriptions.  Patient is due for urine tox today.  And we will renew her pain management contract.  Patient reports she will talk with her pain management doctor which is [facility] and see if they are willing to take over prescriptions of her large amounts of opioids.

## 2022-08-04 LAB
ALBUMIN SERPL-MCNC: 4.6 G/DL (ref 3.8–4.9)
ALBUMIN/CREAT UR: 19 MG/G CREAT (ref 0–29)
ALBUMIN/GLOB SERPL: 1.9 {RATIO} (ref 1.2–2.2)
ALP SERPL-CCNC: 44 IU/L (ref 44–121)
ALT SERPL-CCNC: 21 IU/L (ref 0–32)
AST SERPL-CCNC: 15 IU/L (ref 0–40)
BASOPHILS # BLD AUTO: 0.1 X10E3/UL (ref 0–0.2)
BASOPHILS NFR BLD AUTO: 1 %
BILIRUB SERPL-MCNC: 0.2 MG/DL (ref 0–1.2)
BUN SERPL-MCNC: 16 MG/DL (ref 6–24)
BUN/CREAT SERPL: 24 (ref 9–23)
CALCIUM SERPL-MCNC: 9.9 MG/DL (ref 8.7–10.2)
CHLORIDE SERPL-SCNC: 101 MMOL/L (ref 96–106)
CHOLEST SERPL-MCNC: 217 MG/DL (ref 100–199)
CO2 SERPL-SCNC: 24 MMOL/L (ref 20–29)
CREAT SERPL-MCNC: 0.68 MG/DL (ref 0.57–1)
CREAT UR-MCNC: 74.2 MG/DL
EGFRCR SERPLBLD CKD-EPI 2021: 101 ML/MIN/1.73
EOSINOPHIL # BLD AUTO: 0.3 X10E3/UL (ref 0–0.4)
EOSINOPHIL NFR BLD AUTO: 3 %
ERYTHROCYTE [DISTWIDTH] IN BLOOD BY AUTOMATED COUNT: 13.2 % (ref 11.7–15.4)
GLOBULIN SER CALC-MCNC: 2.4 G/DL (ref 1.5–4.5)
GLUCOSE SERPL-MCNC: 159 MG/DL (ref 65–99)
HBA1C MFR BLD: 7.5 % (ref 4.8–5.6)
HCT VFR BLD AUTO: 44.4 % (ref 34–46.6)
HDLC SERPL-MCNC: 41 MG/DL
HGB BLD-MCNC: 14.9 G/DL (ref 11.1–15.9)
IMM GRANULOCYTES # BLD AUTO: 0.1 X10E3/UL (ref 0–0.1)
IMM GRANULOCYTES NFR BLD AUTO: 1 %
LDLC SERPL CALC-MCNC: 124 MG/DL (ref 0–99)
LYMPHOCYTES # BLD AUTO: 5.6 X10E3/UL (ref 0.7–3.1)
LYMPHOCYTES NFR BLD AUTO: 46 %
MCH RBC QN AUTO: 30.2 PG (ref 26.6–33)
MCHC RBC AUTO-ENTMCNC: 33.6 G/DL (ref 31.5–35.7)
MCV RBC AUTO: 90 FL (ref 79–97)
MICROALBUMIN UR-MCNC: 14.4 UG/ML
MONOCYTES # BLD AUTO: 1 X10E3/UL (ref 0.1–0.9)
MONOCYTES NFR BLD AUTO: 9 %
NEUTROPHILS # BLD AUTO: 4.8 X10E3/UL (ref 1.4–7)
NEUTROPHILS NFR BLD AUTO: 40 %
PLATELET # BLD AUTO: 329 X10E3/UL (ref 150–450)
POTASSIUM SERPL-SCNC: 4.4 MMOL/L (ref 3.5–5.2)
PROT SERPL-MCNC: 7 G/DL (ref 6–8.5)
RBC # BLD AUTO: 4.94 X10E6/UL (ref 3.77–5.28)
SODIUM SERPL-SCNC: 139 MMOL/L (ref 134–144)
TRIGL SERPL-MCNC: 292 MG/DL (ref 0–149)
VLDLC SERPL CALC-MCNC: 52 MG/DL (ref 5–40)
WBC # BLD AUTO: 11.9 X10E3/UL (ref 3.4–10.8)

## 2022-08-18 ENCOUNTER — TELEPHONE (OUTPATIENT)
Dept: FAMILY MEDICINE | Facility: CLINIC | Age: 57
End: 2022-08-18
Payer: COMMERCIAL

## 2022-08-18 NOTE — TELEPHONE ENCOUNTER
SBR    Situation: patient tested positive for covid on at home test     Background: patient is currently having symptoms of diarrhea , body aches , cough , congestion  , heachaes      Request: patient is requesting medication . Patient currently on vacation , please send to   [address] [address]

## 2022-08-18 NOTE — TELEPHONE ENCOUNTER
Patient sent message that she is positive for COVID.  Is requesting medication to be sent to the pharmacy in [place].  I attempted to reach our with the only phone number available.  Patient's phone did not go to [place] just stopped ringing.  MA will try to contact the patient.  Paxlovid is contraindicated with the combination of medications that she is on including alprazolam, fentanyl, oxycodones.  Unfortunately patient will have to treat her COVID symptomatically.  She is not a candidate for the antiviral.

## 2022-08-18 NOTE — TELEPHONE ENCOUNTER
Spoke to yessica and explained she cannot take paxlovid and to treat he symptoms with otc medication yessica understands.and will call us if she needs us or go to the er if worse.

## 2022-08-18 NOTE — TELEPHONE ENCOUNTER
Let her know I tried to reach her to explain. She cannot take med as I will interact with her opioids and can cause death. She will have to treat symptomatically.

## 2022-09-15 ENCOUNTER — TELEPHONE (OUTPATIENT)
Dept: FAMILY MEDICINE | Facility: CLINIC | Age: 57
End: 2022-09-15
Payer: COMMERCIAL

## 2022-09-15 DIAGNOSIS — F41.9 ANXIETY: ICD-10-CM

## 2022-09-15 RX ORDER — ALPRAZOLAM 0.5 MG/1
1 TABLET ORAL 3 TIMES DAILY PRN
Qty: 90 TABLET | Refills: 1 | Status: SHIPPED | OUTPATIENT
Start: 2022-09-15 | End: 2022-11-09 | Stop reason: SDUPTHER

## 2022-09-15 NOTE — TELEPHONE ENCOUNTER
Patient called regarding(name of test)? Lab test results  Patient name? Hilda Cornelius  Phone number? [phone]  Date of test?  08/04/2022  Where was test performed? [facility]  Ordering physician? Dr. Sullivan

## 2022-09-15 NOTE — TELEPHONE ENCOUNTER
Medicine Refill Request    Last Office: 7/21/2022   Last Consult Visit: Visit date not found  Last Telemedicine Visit: 1/25/2021 Emily Sullivan MD    Next Appointment: Visit date not found      Current Outpatient Medications:     albuterol HFA (VENTOLIN HFA) 90 mcg/actuation inhaler, TAKE 2 PUFFS BY MOUTH EVERY 6 HOURS AS NEEDED FOR WHEEZE, Disp: 8.5 each, Rfl: 3    ALPRAZolam (XANAX) 0.5 mg tablet, Take 2 tablets (1 mg total) by mouth 3 (three) times a day as needed for anxiety., Disp: 90 tablet, Rfl: 1    blood-glucose meter (PRECISION XTRA MONITOR) misc, 1 strip 2 (two) times a day., Disp: 1 each, Rfl: 0    escitalopram (LEXAPRO) 20 mg tablet, TAKE 1 TABLET BY MOUTH EVERY DAY, Disp: 30 tablet, Rfl: 3    fentaNYL (DURAGESIC) 12 mcg/hr, Place 1 patch on the skin every 3 (three) days. Use with 25 Mcg patch for total of 37 mcg., Disp: 10 patch, Rfl: 0    fentaNYL (DURAGESIC) 25 mcg/hr, Place 1 patch on the skin See admin instr. Apply 1 patch topically every 72 hours. Use with 12mcg, for 37 mcg total., Disp: 10 patch, Rfl: 0    FREESTYLE THOM 2 SENSOR kit, PLACE 1 DEVICE ON THE SKIN EVERY 2 WEEKS., Disp: , Rfl:     gabapentin (NEURONTIN) 100 mg capsule, Take 1 capsule (100 mg total) by mouth nightly., Disp: 90 capsule, Rfl: 1    hydrochlorothiazide (HYDRODIURIL) 25 mg tablet, TAKE 1 TABLET BY MOUTH EVERY DAY, Disp: 30 tablet, Rfl: 3    lamoTRIgine (LaMICtal) 25 mg tablet, TAKE 1 TABLET AT BEDTIME FOR 2 WEEKS, THEN INCREASE TO 2 TABLETS AT BEDTIME, Disp: , Rfl:     lancets (freestyle) 28 gauge misc, 1 strip 2 (two) times a day., Disp: 100 each, Rfl: 3    lansoprazole (PREVACID) 30 mg capsule, Take 1 capsule (30 mg total) by mouth daily before breakfast., Disp: 30 capsule, Rfl: 5    meloxicam (MOBIC) 15 mg tablet, TAKE 1 TABLET DAILY IF NEEDED WITH A FULL STOMACH, Disp: , Rfl:     metFORMIN (GLUCOPHAGE) 1,000 mg tablet, Take 1 tablet (1,000 mg total) by mouth 2 (two) times a day with meals.  Increasing dose to 1000 mg. BID. Patient will use what she has, and call when she needs refills., Disp: 180 tablet, Rfl: 1    metoprolol succinate XL (TOPROL-XL) 100 mg 24 hr tablet, TAKE 1 TABLET BY MOUTH EVERY DAY, Disp: 30 tablet, Rfl: 3    olmesartan (BENICAR) 40 mg tablet, TAKE 1 TABLET BY MOUTH EVERY DAY, Disp: 30 tablet, Rfl: 3    oxyCODONE (ROXICODONE) 5 mg immediate release tablet, Take 1 tablet (5 mg total) by mouth every 4 (four) hours as needed for moderate pain., Disp: 180 tablet, Rfl: 0    SITagliptin (JANUVIA) 100 mg tablet, Take 1 tablet (100 mg total) by mouth daily., Disp: 90 tablet, Rfl: 1    triamcinolone (KENALOG) 0.1 % ointment, Apply 1 application topically 2 (two) times a day., Disp: 30 g, Rfl: 1      BP Readings from Last 3 Encounters:   07/21/22 140/90   04/08/22 110/70   03/17/22 140/76       Recent Lab results:  Lab Results   Component Value Date    CHOL 217 (H) 08/03/2022   ,   Lab Results   Component Value Date    HDL 41 08/03/2022   ,   Lab Results   Component Value Date    LDLCALC 124 (H) 08/03/2022   ,   Lab Results   Component Value Date    TRIG 292 (H) 08/03/2022        Lab Results   Component Value Date    GLUCOSE 159 (H) 08/03/2022   ,   Lab Results   Component Value Date    HGBA1C 7.5 (H) 08/03/2022         Lab Results   Component Value Date    CREATININE 0.68 08/03/2022       Lab Results   Component Value Date    TSH 4.890 (H) 12/30/2019

## 2022-09-15 NOTE — TELEPHONE ENCOUNTER
Patient contacted office to request prescription refill:    Name of medication: alprazolam  Strength of medication: 0.5 mg  SIG: take 1 pill 3 times daily prn anxiety  Quantity: 90  Requested number refills: 1      Preferred Pharmacy: [facility]  Pharmacy number:  [phone]

## 2022-10-10 NOTE — TELEPHONE ENCOUNTER
Attempted to reach patient by phone we will.  Phone did not go to voicemail.  Just stopped ringing.  We will continue to try

## 2022-10-22 ENCOUNTER — TELEPHONE (OUTPATIENT)
Dept: FAMILY MEDICINE | Facility: CLINIC | Age: 57
End: 2022-10-22
Payer: COMMERCIAL

## 2022-10-28 ENCOUNTER — TELEPHONE (OUTPATIENT)
Dept: FAMILY MEDICINE | Facility: CLINIC | Age: 57
End: 2022-10-28

## 2022-10-28 NOTE — TELEPHONE ENCOUNTER
Insurance Referral Request   Patient PCP: Emily Sullivan MD  Insurance Carrier: N/A  Specialist Name:Samantha Iglesias  Provider Specialty: dermatology  Provider NPI: [id number]  Office Location: [address] [place] [address]  Reason for Visit or Diagnosis Code: skin infection  Appointment Date: 11-01-22 9AM    Phone number [phone]    Insurance Referral will be submitted to Insurance within 2 business days.

## 2022-10-29 DIAGNOSIS — F41.9 ANXIETY: ICD-10-CM

## 2022-10-31 ENCOUNTER — TELEPHONE (OUTPATIENT)
Dept: FAMILY MEDICINE | Facility: CLINIC | Age: 57
End: 2022-10-31
Payer: COMMERCIAL

## 2022-10-31 RX ORDER — LANSOPRAZOLE 30 MG/1
CAPSULE, DELAYED RELEASE ORAL
Qty: 30 CAPSULE | Refills: 2 | Status: SHIPPED | OUTPATIENT
Start: 2022-10-31 | End: 2023-04-07 | Stop reason: SDUPTHER

## 2022-10-31 NOTE — TELEPHONE ENCOUNTER
Please contact patient to schedule in office appointment to avoid delays when requesting future refills.

## 2022-11-01 ENCOUNTER — APPOINTMENT (RX ONLY)
Dept: URBAN - METROPOLITAN AREA CLINIC 28 | Facility: CLINIC | Age: 57
Setting detail: DERMATOLOGY
End: 2022-11-01

## 2022-11-01 DIAGNOSIS — L85.3 XEROSIS CUTIS: ICD-10-CM

## 2022-11-01 DIAGNOSIS — L738 OTHER SPECIFIED DISEASES OF HAIR AND HAIR FOLLICLES: ICD-10-CM

## 2022-11-01 DIAGNOSIS — L663 OTHER SPECIFIED DISEASES OF HAIR AND HAIR FOLLICLES: ICD-10-CM

## 2022-11-01 DIAGNOSIS — L73.9 FOLLICULAR DISORDER, UNSPECIFIED: ICD-10-CM

## 2022-11-01 PROBLEM — L02.821 FURUNCLE OF HEAD [ANY PART, EXCEPT FACE]: Status: ACTIVE | Noted: 2022-11-01

## 2022-11-01 PROCEDURE — ? COUNSELING

## 2022-11-01 PROCEDURE — 99203 OFFICE O/P NEW LOW 30 MIN: CPT

## 2022-11-01 PROCEDURE — ? PRESCRIPTION

## 2022-11-01 PROCEDURE — ? PRESCRIPTION MEDICATION MANAGEMENT

## 2022-11-01 PROCEDURE — ? IN-HOUSE DISPENSING PHARMACY

## 2022-11-01 RX ORDER — MINOCYCLINE HYDROCHLORIDE 100 MG/1
1 CAPSULE ORAL QDAY
Qty: 30 | Refills: 0 | Status: ERX | COMMUNITY
Start: 2022-11-01

## 2022-11-01 RX ORDER — AMMONIUM LACTATE 12 %
1 LOTION (GRAM) TOPICAL QDAY
Qty: 227 | Refills: 3 | Status: ERX | COMMUNITY
Start: 2022-11-01

## 2022-11-01 RX ORDER — CLOBETASOL PROPIONATE 0.05 G/100ML
1 LOTION TOPICAL QDAY
Qty: 59 | Refills: 3 | Status: ERX | COMMUNITY
Start: 2022-11-01

## 2022-11-01 RX ADMIN — CLOBETASOL PROPIONATE 1: 0.05 LOTION TOPICAL at 00:00

## 2022-11-01 RX ADMIN — MINOCYCLINE HYDROCHLORIDE 1: 100 CAPSULE ORAL at 00:00

## 2022-11-01 RX ADMIN — Medication 1: at 00:00

## 2022-11-01 ASSESSMENT — LOCATION ZONE DERM
LOCATION ZONE: FEET
LOCATION ZONE: SCALP

## 2022-11-01 ASSESSMENT — LOCATION DETAILED DESCRIPTION DERM
LOCATION DETAILED: MID-FRONTAL SCALP
LOCATION DETAILED: RIGHT PLANTAR FOREFOOT OVERLYING 3RD METATARSAL
LOCATION DETAILED: LEFT PLANTAR FOREFOOT OVERLYING 4TH METATARSAL

## 2022-11-01 ASSESSMENT — LOCATION SIMPLE DESCRIPTION DERM
LOCATION SIMPLE: LEFT PLANTAR SURFACE
LOCATION SIMPLE: RIGHT PLANTAR SURFACE
LOCATION SIMPLE: ANTERIOR SCALP

## 2022-11-01 NOTE — PROCEDURE: PRESCRIPTION MEDICATION MANAGEMENT
Render In Strict Bullet Format?: No
Detail Level: Zone
Initiate Treatment: ammonium lactate 12% lotion: Apply a thin layer to body after shower QDAY
Initiate Treatment: minocycline 100mg capsule: Take 1 pill QDAY with food\\nclobetasol 0.05% lotion: Scatter drops on scalp and massage into skin\\nClariFi shampoo: lather onto scalp, let sit 3-5 minutes before rinsing once weekly

## 2022-11-01 NOTE — HPI: BUMPS
How Severe Are Your Bumps?: mild
Have Your Bumps Been Treated?: not been treated
Is This A New Presentation, Or A Follow-Up?: Bumps
Additional History: Patient describes having to use [employer] for the summer and she is experiencing painful acne and a very oily scalp.

## 2022-11-01 NOTE — HPI: DRY SKIN
Returning patients call. Patient states she had EMB this past Friday and is still having some mild cramping, denies any bleeding. Per ABY Jha, have patient take Motrin and monitor symptoms. Patient instructed to call with worsening pain. Patient V/U   
Is This A New Presentation Or A Follow-Up?: Dry Skin
How Severe Is Your Dry Skin?: mild
Additional History: Patient is a diabetic and has had rough and dry feet for years.
N/A

## 2022-11-01 NOTE — PROCEDURE: IN-HOUSE DISPENSING PHARMACY
Product 37 Price/Unit (In Dollars): 0
Product 9 Unit Type: bottle(s)
Product 54 Unit Type: mg
Product 4 Amount/Unit (Numbers Only): 1
Name Of Product 12: 10/2 Therapeutic pads
Product 3 Refills: 3
Product 5 Unit Type: kit(s)
Product 19 Price/Unit (In Dollars): 32
Name Of Product 2: proscriptix pills
Product 13 Price/Unit (In Dollars): 56
Name Of Product 8: Biocorneum scar gel
Name Of Product 10: Proscriptix shampoo, conditioner and pills
Product 11 Price/Unit (In Dollars): 105
Product 16 Application Directions: apply scattered drops to scalp qam
Name Of Product 6: 5/2 Therapeutic pads
Name Of Product 4: Ultra Gentle Chemical Free SPF 30
Product 7 Price/Unit (In Dollars): 36.75
Product 18 Application Directions: wash face BID\\n(JANET)
Product 1 Price/Unit (In Dollars): 84
Product 14 Application Directions: apply thin layer to under eye area qday
Product 5 Price/Unit (In Dollars): 55
Product 4 Price/Unit (In Dollars): 45
Product 8 Refills: 2
Product 11 Application Directions: apply thin layer to periorbital region qhs
Product 19 Application Directions: Lather onto scalp, let sit 3-5 minutes before rinsing once weekly
Product 1 Application Directions: apply thin layer to face qhs
Product 7 Application Directions: apply thin layer to face qam
Name Of Product 16: X.Cyte stimulating hair serum
Product 9 Application Directions: spray thin layer on back qday
Product 5 Application Directions: wash face and neck qday with cleanser then apply thin layer of lotion
Name Of Product 18: Proscriptix balancing cleanser
Product 11 Unit Type: jar(s)
Product 3 Price/Unit (In Dollars): 70
Product 15 Price/Unit (In Dollars): 39
Product 1 Unit Type: grams
Name Of Product 14: Hyaluronic acid eye serum
Product 17 Price/Unit (In Dollars): 78
Product 20 Application Directions: apply to face QAM and repeat every 2 hours when in the sun
Product 7 Unit Type: tube(s)
Product 18 Price/Unit (In Dollars): 12
Name Of Product 13: DensiFi Shampoo and Conditioner
Render Refills If Set To 0: Yes
Product 14 Shin/Unit (In Dollars): 51
Name Of Product 11: retinol eye cream
Name Of Product 7: R Essentials sunscreen SPF50
Product 15 Application Directions: Wash aa of body qday in shower
Name Of Product 1: Eli tretinoin 0.025% topical cream
Product 2 Price/Unit (In Dollars): 46
Name Of Product 9: 10/2 Therapeutic Spray
Product 8 Price/Unit (In Dollars): 29.95
Product 17 Application Directions: wipe aa of face qday
Name Of Product 5: R Essentials KP Kit
Product 10 Price/Unit (In Dollars): 85
Product 6 Price/Unit (In Dollars): 30
Product 13 Application Directions: Use to wash hair qday
Name Of Product 20: Elta MD tinted
Detail Level: Zone
Product 12 Application Directions: wipe face qam
Product 2 Application Directions: take 1 tab po TID
Product 20 Price/Unit (In Dollars): 32.50
Product 8 Application Directions: apply thin layer to scar BID
Product 10 Application Directions: Wash hair with shampoo and conditioner each wash; take 1 pill po TID
Name Of Product 3: Eli tretinoin 0.05% topical cream
Name Of Product 15: AHA 10% cleanser
Product 6 Application Directions: wipe face qam after washing
Name Of Product 17: Brightening pads
Product 1 Amount/Unit (Numbers Only): 20
Product 20 Refills: 5
Product 4 Application Directions: apply thin layer to face qam and every 2 hours during the day
Name Of Product 19: ClariFi shampoo

## 2022-11-02 NOTE — TELEPHONE ENCOUNTER
Pt has been scheduled for Nov 7 w/Dr. Camp.  Documents for LTD have been placed in [facility] for completion.

## 2022-11-07 ENCOUNTER — OFFICE VISIT (OUTPATIENT)
Dept: FAMILY MEDICINE | Facility: CLINIC | Age: 57
End: 2022-11-07
Payer: COMMERCIAL

## 2022-11-07 VITALS
SYSTOLIC BLOOD PRESSURE: 130 MMHG | HEART RATE: 82 BPM | BODY MASS INDEX: 30.16 KG/M2 | HEIGHT: 65 IN | RESPIRATION RATE: 6 BRPM | DIASTOLIC BLOOD PRESSURE: 78 MMHG | TEMPERATURE: 97.8 F | WEIGHT: 181 LBS | OXYGEN SATURATION: 95 %

## 2022-11-07 DIAGNOSIS — D72.820 LYMPHOCYTOSIS: ICD-10-CM

## 2022-11-07 DIAGNOSIS — I10 HYPERTENSION, ESSENTIAL: Primary | ICD-10-CM

## 2022-11-07 DIAGNOSIS — G89.4 CHRONIC PAIN SYNDROME: ICD-10-CM

## 2022-11-07 DIAGNOSIS — F41.9 ANXIETY: ICD-10-CM

## 2022-11-07 DIAGNOSIS — E11.9 TYPE 2 DIABETES MELLITUS WITHOUT COMPLICATION, WITHOUT LONG-TERM CURRENT USE OF INSULIN (CMS/HCC): ICD-10-CM

## 2022-11-07 DIAGNOSIS — F33.1 MDD (MAJOR DEPRESSIVE DISORDER), RECURRENT EPISODE, MODERATE (CMS/HCC): ICD-10-CM

## 2022-11-07 DIAGNOSIS — Z00.00 ENCOUNTER FOR GENERAL ADULT MEDICAL EXAMINATION WITHOUT ABNORMAL FINDINGS: ICD-10-CM

## 2022-11-07 PROBLEM — N28.89 KIDNEY MASS: Status: RESOLVED | Noted: 2019-06-29 | Resolved: 2022-11-07

## 2022-11-07 PROBLEM — R21 RASH: Status: RESOLVED | Noted: 2022-05-15 | Resolved: 2022-11-07

## 2022-11-07 PROBLEM — N39.0 URINARY TRACT INFECTIOUS DISEASE: Status: RESOLVED | Noted: 2021-03-05 | Resolved: 2022-11-07

## 2022-11-07 PROBLEM — R73.01 IMPAIRED FASTING GLUCOSE: Status: RESOLVED | Noted: 2018-09-04 | Resolved: 2022-11-07

## 2022-11-07 PROBLEM — N28.9 KIDNEY LESION: Status: RESOLVED | Noted: 2018-11-24 | Resolved: 2022-11-07

## 2022-11-07 PROBLEM — J98.01 BRONCHOSPASM: Status: RESOLVED | Noted: 2019-01-29 | Resolved: 2022-11-07

## 2022-11-07 PROBLEM — N30.90 RECURRENT CYSTITIS: Status: RESOLVED | Noted: 2021-03-03 | Resolved: 2022-11-07

## 2022-11-07 PROBLEM — J06.9 VIRAL UPPER RESPIRATORY TRACT INFECTION: Status: RESOLVED | Noted: 2019-03-22 | Resolved: 2022-11-07

## 2022-11-07 PROBLEM — N30.00 ACUTE CYSTITIS WITHOUT HEMATURIA: Status: RESOLVED | Noted: 2021-02-06 | Resolved: 2022-11-07

## 2022-11-07 PROBLEM — R31.0 FRANK HEMATURIA: Status: RESOLVED | Noted: 2021-03-05 | Resolved: 2022-11-07

## 2022-11-07 PROCEDURE — 3075F SYST BP GE 130 - 139MM HG: CPT | Performed by: FAMILY MEDICINE

## 2022-11-07 PROCEDURE — 3078F DIAST BP <80 MM HG: CPT | Performed by: FAMILY MEDICINE

## 2022-11-07 PROCEDURE — 90471 IMMUNIZATION ADMIN: CPT | Performed by: FAMILY MEDICINE

## 2022-11-07 PROCEDURE — 90686 IIV4 VACC NO PRSV 0.5 ML IM: CPT | Performed by: FAMILY MEDICINE

## 2022-11-07 PROCEDURE — 3008F BODY MASS INDEX DOCD: CPT | Performed by: FAMILY MEDICINE

## 2022-11-07 PROCEDURE — 99214 OFFICE O/P EST MOD 30 MIN: CPT | Mod: 25 | Performed by: FAMILY MEDICINE

## 2022-11-07 RX ORDER — OXYCODONE HYDROCHLORIDE 5 MG/1
CAPSULE ORAL
COMMUNITY
Start: 2022-10-30

## 2022-11-07 RX ORDER — SEMAGLUTIDE 1.34 MG/ML
INJECTION, SOLUTION SUBCUTANEOUS
COMMUNITY
Start: 2022-08-17 | End: 2025-03-06 | Stop reason: SDUPTHER

## 2022-11-07 RX ORDER — AMMONIUM LACTATE 12 G/100G
LOTION TOPICAL
COMMUNITY
Start: 2022-11-01 | End: 2023-04-07 | Stop reason: SDUPTHER

## 2022-11-07 RX ORDER — CLOBETASOL PROPIONATE 0.5 MG/ML
LOTION TOPICAL
COMMUNITY
Start: 2022-11-01 | End: 2023-04-07 | Stop reason: SDUPTHER

## 2022-11-07 ASSESSMENT — PATIENT HEALTH QUESTIONNAIRE - PHQ9: SUM OF ALL RESPONSES TO PHQ9 QUESTIONS 1 & 2: 0

## 2022-11-07 NOTE — ASSESSMENT & PLAN NOTE
Anxiety is a major issue for this patient and she is on high-dose Lexapro as well as 3 mg of alprazolam a day which I have told her is not a good long-term plan.  She will continue Lexapro for now and will start weaning her alprazolam by 1/4 mm a day a week so that we can try to get her off of it.  She agrees with this plan and I will see her back in a month for recheck.  She is also aware that concurrent use of alprazolam with oxycodone is not recommended and that she is at risk for unintentional overdose.

## 2022-11-07 NOTE — ASSESSMENT & PLAN NOTE
Blood pressures controlled on her current regiment and her recent renal function testing is normal with a GFR of 66.

## 2022-11-07 NOTE — PROGRESS NOTES
"      Subjective      Patient ID: Hilda Cornelius is a 57 y.o. female.  1965      This 57-year-old  female presents to establish care and discuss her chronic medical illnesses without any acute complaints.    She has type 2 diabetes mellitus for which she was recently started on Ozempic which she is tolerating well and is followed through endocrine and [facility].  Her most recent hemoglobin A1c on August 3, 2022 was 7.5.  She also has chronic low back pain which she says started with scoliosis but has progressed to degenerative disease for which she is now seeing pain management.  She is currently taking 30 mg of oxycodone a day and recently had an MRI.  Interventions are planned.    She has a long history of anxiety and depression for which she is getting talk therapy which she finds somewhat helpful but continues to use 1 mg of alprazolam 3 times a day along with Lexapro 20 mg daily.  She says that she has had anxiety her whole life but it became significantly worse around menopause and now feels that she cannot get off her medication.    She stopped working in February 2022 when she quit her job because of disciplinary issues.  She quit tobacco in February 2022 after approximately 40 pack years.      The following have been reviewed and updated as appropriate in this visit:   Problems       Review of Systems    Objective     Vitals:    11/07/22 1534   BP: 130/78   BP Location: Left upper arm   Patient Position: Sitting   Pulse: 82   Resp: (!) 6   Temp: 36.6 °C (97.8 °F)   TempSrc: Oral   SpO2: 95%   Weight: 82.1 kg (181 lb)   Height: 1.651 m (5' 5\")     Body mass index is 30.12 kg/m².    Physical Exam  Constitutional:       General: She is not in acute distress.     Appearance: Normal appearance. She is not ill-appearing.   HENT:      Right Ear: Tympanic membrane, ear canal and external ear normal.      Left Ear: Tympanic membrane, ear canal and external ear normal.   Eyes:      " Conjunctiva/sclera: Conjunctivae normal.   Neck:      Vascular: No carotid bruit.   Cardiovascular:      Rate and Rhythm: Normal rate and regular rhythm.      Pulses: Normal pulses.      Heart sounds: Normal heart sounds.   Pulmonary:      Effort: Pulmonary effort is normal.      Breath sounds: Normal breath sounds.   Musculoskeletal:      Cervical back: Normal range of motion. No tenderness.   Lymphadenopathy:      Cervical: Cervical adenopathy present.   Neurological:      Mental Status: She is alert.         Assessment/Plan   Diagnoses and all orders for this visit:    Hypertension, essential (Primary)  Assessment & Plan:  Blood pressures controlled on her current regiment and her recent renal function testing is normal with a GFR of 66.      Lymphocytosis  Assessment & Plan:  Mildly elevated leukocytes on recent blood test which we will follow conservatively.      Type 2 diabetes mellitus without complication, without long-term current use of insulin (CMS/[facility])  Assessment & Plan:  She is followed by endocrinology, nurse practitioner Kerri, at [facility] with a recent A1c of 7.5 and recently started on Ozempic.  I will follow along conservatively.      Chronic pain syndrome  Assessment & Plan:  Has chronic pain primarily related to mid and lower back due to scoliosis and degenerative disease for which she is now seeing Dr. Murray, pain management who has decreased her fentanyl from 37.5 mcg down to 25 mcg and plans to eventually wean her off oxycodone.      Anxiety  Assessment & Plan:  Anxiety is a major issue for this patient and she is on high-dose Lexapro as well as 3 mg of alprazolam a day which I have told her is not a good long-term plan.  She will continue Lexapro for now and will start weaning her alprazolam by 1/4 mm a day a week so that we can try to get her off of it.  She agrees with this plan and I will see her back in a month for recheck.  She is also aware that concurrent use of  alprazolam with oxycodone is not recommended and that she is at risk for unintentional overdose.      MDD (major depressive disorder), recurrent episode, moderate (CMS/HCC)  Assessment & Plan:  Adequately controlled on her current regiment which she will continue and I do think she will feel better when she is taking lower doses of benzodiazepines and oxycodone      Encounter for general adult medical examination without abnormal findings  -     Influenza vaccine quadrivalent preservative free 6 mon and older IM (FluLaval)

## 2022-11-07 NOTE — ASSESSMENT & PLAN NOTE
Has chronic pain primarily related to mid and lower back due to scoliosis and degenerative disease for which she is now seeing Dr. Murray, pain management who has decreased her fentanyl from 37.5 mcg down to 25 mcg and plans to eventually wean her off oxycodone.

## 2022-11-07 NOTE — ASSESSMENT & PLAN NOTE
Adequately controlled on her current regiment which she will continue and I do think she will feel better when she is taking lower doses of benzodiazepines and oxycodone

## 2022-11-07 NOTE — ASSESSMENT & PLAN NOTE
She is followed by endocrinology, nurse practitioner Kerri, at [facility] with a recent A1c of 7.5 and recently started on Ozempic.  I will follow along conservatively.

## 2022-11-09 NOTE — TELEPHONE ENCOUNTER
Medicine Refill Request    Last Office: 7/21/2022   Last Consult Visit: Visit date not found  Last Telemedicine Visit: 1/25/2021 Emily Sullivan MD    Next Appointment: Visit date not found      Current Outpatient Medications:     lansoprazole (PREVACID) 30 mg capsule, TAKE 1 CAPSULE BY MOUTH DAILY BEFORE BREAKFAST., Disp: 30 capsule, Rfl: 2    ALPRAZolam (XANAX) 0.5 mg tablet, Take 2 tablets (1 mg total) by mouth 3 (three) times a day as needed for anxiety., Disp: 90 tablet, Rfl: 1    ammonium lactate (LAC-HYDRIN) 12 % lotion, APPLY A THIN LAYER TO BODY AFTER SHOWER DAILY, Disp: , Rfl:     blood-glucose meter (PRECISION XTRA MONITOR) misc, 1 strip 2 (two) times a day., Disp: 1 each, Rfl: 0    clobetasoL 0.05 % lotion, SCATTER DROPS ON SCALP AND MASSAGE INTO SKIN, Disp: , Rfl:     escitalopram (LEXAPRO) 20 mg tablet, TAKE 1 TABLET BY MOUTH EVERY DAY, Disp: 30 tablet, Rfl: 3    fentaNYL (DURAGESIC) 25 mcg/hr, Place 1 patch on the skin See admin instr. Apply 1 patch topically every 72 hours. Use with 12mcg, for 37 mcg total., Disp: 10 patch, Rfl: 0    FREESTYLE THOM 2 SENSOR kit, PLACE 1 DEVICE ON THE SKIN EVERY 2 WEEKS., Disp: , Rfl:     hydrochlorothiazide (HYDRODIURIL) 25 mg tablet, TAKE 1 TABLET BY MOUTH EVERY DAY, Disp: 30 tablet, Rfl: 3    lancets (freestyle) 28 gauge misc, 1 strip 2 (two) times a day., Disp: 100 each, Rfl: 3    meloxicam (MOBIC) 15 mg tablet, TAKE 1 TABLET DAILY IF NEEDED WITH A FULL STOMACH, Disp: , Rfl:     metoprolol succinate XL (TOPROL-XL) 100 mg 24 hr tablet, TAKE 1 TABLET BY MOUTH EVERY DAY, Disp: 30 tablet, Rfl: 3    olmesartan (BENICAR) 40 mg tablet, TAKE 1 TABLET BY MOUTH EVERY DAY, Disp: 30 tablet, Rfl: 3    oxyCODONE (OXY-IR) 5 mg capsule, TAKE 1-2 CAPSULES BY MOUTH EVERY 8 HOURS AS NEEDED PAIN, Disp: , Rfl:     semaglutide (OZEMPIC) 0.25 mg or 0.5 mg(2 mg/1.5 mL) pen injector, Inject 0.25 mg under skin once a week for 4 weeks, then increase to 0.5 mg once a  week., Disp: , Rfl:       BP Readings from Last 3 Encounters:   11/07/22 130/78   07/21/22 140/90   04/08/22 110/70       Recent Lab results:  Lab Results   Component Value Date    CHOL 217 (H) 08/03/2022   ,   Lab Results   Component Value Date    HDL 41 08/03/2022   ,   Lab Results   Component Value Date    LDLCALC 124 (H) 08/03/2022   ,   Lab Results   Component Value Date    TRIG 292 (H) 08/03/2022        Lab Results   Component Value Date    GLUCOSE 159 (H) 08/03/2022   ,   Lab Results   Component Value Date    HGBA1C 7.5 (H) 08/03/2022         Lab Results   Component Value Date    CREATININE 0.68 08/03/2022       Lab Results   Component Value Date    TSH 4.890 (H) 12/30/2019

## 2022-11-10 RX ORDER — ALPRAZOLAM 0.5 MG/1
1 TABLET ORAL 3 TIMES DAILY PRN
Qty: 90 TABLET | Refills: 1 | Status: SHIPPED | OUTPATIENT
Start: 2022-11-10 | End: 2023-01-11 | Stop reason: SDUPTHER

## 2022-11-21 ENCOUNTER — TELEPHONE (OUTPATIENT)
Dept: FAMILY MEDICINE | Facility: CLINIC | Age: 57
End: 2022-11-21
Payer: COMMERCIAL

## 2023-01-09 DIAGNOSIS — F41.9 ANXIETY: ICD-10-CM

## 2023-01-09 RX ORDER — ALPRAZOLAM 0.5 MG/1
1 TABLET ORAL 3 TIMES DAILY PRN
Qty: 90 TABLET | Refills: 1 | Status: CANCELLED | OUTPATIENT
Start: 2023-01-09 | End: 2023-02-08

## 2023-01-09 NOTE — TELEPHONE ENCOUNTER
Medicine Refill Request    Last Office: 11/7/2022   Last Consult Visit: Visit date not found  Last Telemedicine Visit: 1/25/2021 Emily Sullivan MD    Next Appointment: Visit date not found      Current Outpatient Medications:   •  ALPRAZolam (XANAX) 0.5 mg tablet, Take 2 tablets (1 mg total) by mouth 3 (three) times a day as needed for anxiety., Disp: 90 tablet, Rfl: 1  •  ammonium lactate (LAC-HYDRIN) 12 % lotion, APPLY A THIN LAYER TO BODY AFTER SHOWER DAILY, Disp: , Rfl:   •  blood-glucose meter (PRECISION XTRA MONITOR) misc, 1 strip 2 (two) times a day., Disp: 1 each, Rfl: 0  •  clobetasoL 0.05 % lotion, SCATTER DROPS ON SCALP AND MASSAGE INTO SKIN, Disp: , Rfl:   •  escitalopram (LEXAPRO) 20 mg tablet, TAKE 1 TABLET BY MOUTH EVERY DAY, Disp: 30 tablet, Rfl: 3  •  fentaNYL (DURAGESIC) 25 mcg/hr, Place 1 patch on the skin See admin instr. Apply 1 patch topically every 72 hours. Use with 12mcg, for 37 mcg total., Disp: 10 patch, Rfl: 0  •  FREESTYLE THOM 2 SENSOR kit, PLACE 1 DEVICE ON THE SKIN EVERY 2 WEEKS., Disp: , Rfl:   •  hydrochlorothiazide (HYDRODIURIL) 25 mg tablet, TAKE 1 TABLET BY MOUTH EVERY DAY, Disp: 30 tablet, Rfl: 3  •  lancets (freestyle) 28 gauge misc, 1 strip 2 (two) times a day., Disp: 100 each, Rfl: 3  •  lansoprazole (PREVACID) 30 mg capsule, TAKE 1 CAPSULE BY MOUTH DAILY BEFORE BREAKFAST., Disp: 30 capsule, Rfl: 2  •  meloxicam (MOBIC) 15 mg tablet, TAKE 1 TABLET DAILY IF NEEDED WITH A FULL STOMACH, Disp: , Rfl:   •  metoprolol succinate XL (TOPROL-XL) 100 mg 24 hr tablet, TAKE 1 TABLET BY MOUTH EVERY DAY, Disp: 30 tablet, Rfl: 3  •  olmesartan (BENICAR) 40 mg tablet, TAKE 1 TABLET BY MOUTH EVERY DAY, Disp: 30 tablet, Rfl: 3  •  oxyCODONE (OXY-IR) 5 mg capsule, TAKE 1-2 CAPSULES BY MOUTH EVERY 8 HOURS AS NEEDED PAIN, Disp: , Rfl:   •  semaglutide (OZEMPIC) 0.25 mg or 0.5 mg(2 mg/1.5 mL) pen injector, Inject 0.25 mg under skin once a week for 4 weeks, then increase to 0.5 mg once a  week., Disp: , Rfl:       BP Readings from Last 3 Encounters:   11/07/22 130/78   07/21/22 140/90   04/08/22 110/70       Recent Lab results:  Lab Results   Component Value Date    CHOL 217 (H) 08/03/2022   ,   Lab Results   Component Value Date    HDL 41 08/03/2022   ,   Lab Results   Component Value Date    LDLCALC 124 (H) 08/03/2022   ,   Lab Results   Component Value Date    TRIG 292 (H) 08/03/2022        Lab Results   Component Value Date    GLUCOSE 159 (H) 08/03/2022   ,   Lab Results   Component Value Date    HGBA1C 7.5 (H) 08/03/2022         Lab Results   Component Value Date    CREATININE 0.68 08/03/2022       Lab Results   Component Value Date    TSH 4.890 (H) 12/30/2019

## 2023-01-10 NOTE — TELEPHONE ENCOUNTER
Pt called again to check status of her xanax refill.  She has a couple of tablets left.      Please call her to discuss her weaning off and next steps.

## 2023-01-11 ENCOUNTER — TELEPHONE (OUTPATIENT)
Dept: FAMILY MEDICINE | Facility: CLINIC | Age: 58
End: 2023-01-11
Payer: COMMERCIAL

## 2023-01-11 DIAGNOSIS — F41.9 ANXIETY: ICD-10-CM

## 2023-01-11 RX ORDER — ALPRAZOLAM 0.5 MG/1
1 TABLET ORAL 3 TIMES DAILY PRN
Qty: 90 TABLET | Refills: 1 | Status: SHIPPED | OUTPATIENT
Start: 2023-01-11 | End: 2023-03-23 | Stop reason: DRUGHIGH

## 2023-01-11 NOTE — TELEPHONE ENCOUNTER
Patient called to follow-up about the refill request for Alprazolam 0.5 mg . Advised she is now out of the medication, and would like to know when it will be sent to the Pharmacy. Request for call back at [phone].

## 2023-01-17 ENCOUNTER — TELEPHONE (OUTPATIENT)
Dept: FAMILY MEDICINE | Facility: CLINIC | Age: 58
End: 2023-01-17
Payer: COMMERCIAL

## 2023-01-17 NOTE — TELEPHONE ENCOUNTER
Patient has currently been admitted for treatment at [facility] as of 1/15/23 . Patients expected discharge date is 2/14/23 . Patient is scheduled for follow up appt for 2/20/23

## 2023-01-20 ENCOUNTER — HOSPITAL ENCOUNTER (EMERGENCY)
Facility: HOSPITAL | Age: 58
Discharge: HOME | End: 2023-01-20
Attending: EMERGENCY MEDICINE
Payer: COMMERCIAL

## 2023-01-20 ENCOUNTER — APPOINTMENT (EMERGENCY)
Dept: RADIOLOGY | Facility: HOSPITAL | Age: 58
End: 2023-01-20
Attending: EMERGENCY MEDICINE
Payer: COMMERCIAL

## 2023-01-20 VITALS
RESPIRATION RATE: 18 BRPM | SYSTOLIC BLOOD PRESSURE: 126 MMHG | HEART RATE: 72 BPM | HEIGHT: 65 IN | TEMPERATURE: 97.5 F | BODY MASS INDEX: 29.49 KG/M2 | WEIGHT: 177 LBS | DIASTOLIC BLOOD PRESSURE: 72 MMHG | OXYGEN SATURATION: 97 %

## 2023-01-20 DIAGNOSIS — E86.0 MILD DEHYDRATION: ICD-10-CM

## 2023-01-20 DIAGNOSIS — S00.531A CONTUSION OF LIP, INITIAL ENCOUNTER: ICD-10-CM

## 2023-01-20 DIAGNOSIS — R55 SYNCOPE AND COLLAPSE: Primary | ICD-10-CM

## 2023-01-20 DIAGNOSIS — F13.20 BENZODIAZEPINE DEPENDENCE (CMS/HCC): ICD-10-CM

## 2023-01-20 DIAGNOSIS — D72.829 LEUKOCYTOSIS, UNSPECIFIED TYPE: ICD-10-CM

## 2023-01-20 LAB
ABO + RH BLD: NORMAL
ALBUMIN SERPL-MCNC: 4.4 G/DL (ref 3.4–5)
ALP SERPL-CCNC: 47 IU/L (ref 35–126)
ALT SERPL-CCNC: 17 IU/L (ref 11–54)
ANION GAP SERPL CALC-SCNC: 12 MEQ/L (ref 3–15)
AST SERPL-CCNC: 17 IU/L (ref 15–41)
BACTERIA URNS QL MICRO: ABNORMAL /HPF
BASOPHILS # BLD: 0.18 K/UL (ref 0.01–0.1)
BASOPHILS NFR BLD: 1 %
BILIRUB SERPL-MCNC: 0.5 MG/DL (ref 0.3–1.2)
BILIRUB UR QL STRIP.AUTO: NEGATIVE MG/DL
BLD GP AB SCN SERPL QL: NEGATIVE
BUN SERPL-MCNC: 21 MG/DL (ref 8–20)
BURR CELLS BLD QL SMEAR: ABNORMAL
CALCIUM SERPL-MCNC: 9.7 MG/DL (ref 8.9–10.3)
CHLORIDE SERPL-SCNC: 102 MEQ/L (ref 98–109)
CLARITY UR REFRACT.AUTO: CLEAR
CO2 SERPL-SCNC: 22 MEQ/L (ref 22–32)
COLOR UR AUTO: COLORLESS
CREAT SERPL-MCNC: 1 MG/DL (ref 0.6–1.1)
D AG BLD QL: POSITIVE
DIFFERENTIAL METHOD BLD: ABNORMAL
EOSINOPHIL # BLD: 0.73 K/UL (ref 0.04–0.36)
EOSINOPHIL NFR BLD: 4 %
ERYTHROCYTE [DISTWIDTH] IN BLOOD BY AUTOMATED COUNT: 12.2 % (ref 11.7–14.4)
ETHANOL SERPL-MCNC: <5 MG/DL
GFR SERPL CREATININE-BSD FRML MDRD: 56.9 ML/MIN/1.73M*2
GIANT PLATELETS BLD QL SMEAR: ABNORMAL
GLUCOSE SERPL-MCNC: 198 MG/DL (ref 70–99)
GLUCOSE UR STRIP.AUTO-MCNC: NEGATIVE MG/DL
HCT VFR BLDCO AUTO: 46.1 % (ref 35–45)
HGB BLD-MCNC: 15.5 G/DL (ref 11.8–15.7)
HGB UR QL STRIP.AUTO: NEGATIVE
HYALINE CASTS #/AREA URNS LPF: ABNORMAL /LPF
KETONES UR STRIP.AUTO-MCNC: NEGATIVE MG/DL
LABORATORY COMMENT REPORT: NORMAL
LACTATE SERPL-SCNC: 1.5 MMOL/L (ref 0.4–2)
LACTATE SERPL-SCNC: 2.5 MMOL/L (ref 0.4–2)
LEUKOCYTE ESTERASE UR QL STRIP.AUTO: 1
LYMPHOCYTES # BLD: 5.3 K/UL (ref 1.2–3.5)
LYMPHOCYTES NFR BLD: 29 %
MCH RBC QN AUTO: 30.2 PG (ref 28–33.2)
MCHC RBC AUTO-ENTMCNC: 33.6 G/DL (ref 32.2–35.5)
MCV RBC AUTO: 89.7 FL (ref 83–98)
MONOCYTES # BLD: 1.46 K/UL (ref 0.28–0.8)
MONOCYTES NFR BLD: 8 %
MUCOUS THREADS URNS QL MICRO: ABNORMAL /LPF
NEUTS BAND # BLD: 10.6 K/UL (ref 1.7–7)
NEUTS SEG NFR BLD: 58 %
NITRITE UR QL STRIP.AUTO: NEGATIVE
OVALOCYTES BLD QL SMEAR: ABNORMAL
PDW BLD AUTO: 9.9 FL (ref 9.4–12.3)
PH UR STRIP.AUTO: 6.5 [PH]
PLATELET # BLD AUTO: 488 K/UL (ref 150–369)
PLATELET # BLD EST: ABNORMAL 10*3/UL
PLATELET CLUMP BLD QL SMEAR: PRESENT
POTASSIUM SERPL-SCNC: 3.5 MEQ/L (ref 3.6–5.1)
PROT SERPL-MCNC: 7.8 G/DL (ref 6–8.2)
PROT UR QL STRIP.AUTO: NEGATIVE
RBC # BLD AUTO: 5.14 M/UL (ref 3.93–5.22)
RBC #/AREA URNS HPF: ABNORMAL /HPF
SODIUM SERPL-SCNC: 136 MEQ/L (ref 136–144)
SP GR UR REFRACT.AUTO: 1.01
SPECIMEN EXP DATE BLD: NORMAL
SQUAMOUS URNS QL MICRO: ABNORMAL /HPF
TROPONIN I SERPL HS-MCNC: <2 PG/ML
UROBILINOGEN UR STRIP-ACNC: 0.2 EU/DL
WBC # BLD AUTO: 18.28 K/UL (ref 3.8–10.5)
WBC #/AREA URNS HPF: ABNORMAL /HPF

## 2023-01-20 PROCEDURE — 80053 COMPREHEN METABOLIC PANEL: CPT | Performed by: EMERGENCY MEDICINE

## 2023-01-20 PROCEDURE — G0480 DRUG TEST DEF 1-7 CLASSES: HCPCS | Performed by: EMERGENCY MEDICINE

## 2023-01-20 PROCEDURE — 84484 ASSAY OF TROPONIN QUANT: CPT | Performed by: EMERGENCY MEDICINE

## 2023-01-20 PROCEDURE — 83605 ASSAY OF LACTIC ACID: CPT | Performed by: EMERGENCY MEDICINE

## 2023-01-20 PROCEDURE — 81001 URINALYSIS AUTO W/SCOPE: CPT | Mod: 59 | Performed by: EMERGENCY MEDICINE

## 2023-01-20 PROCEDURE — G1004 CDSM NDSC: HCPCS

## 2023-01-20 PROCEDURE — 96360 HYDRATION IV INFUSION INIT: CPT

## 2023-01-20 PROCEDURE — 86901 BLOOD TYPING SEROLOGIC RH(D): CPT

## 2023-01-20 PROCEDURE — 36415 COLL VENOUS BLD VENIPUNCTURE: CPT | Performed by: EMERGENCY MEDICINE

## 2023-01-20 PROCEDURE — 3E0337Z INTRODUCTION OF ELECTROLYTIC AND WATER BALANCE SUBSTANCE INTO PERIPHERAL VEIN, PERCUTANEOUS APPROACH: ICD-10-PCS | Performed by: EMERGENCY MEDICINE

## 2023-01-20 PROCEDURE — 25800000 HC PHARMACY IV SOLUTIONS: Performed by: EMERGENCY MEDICINE

## 2023-01-20 PROCEDURE — 99284 EMERGENCY DEPT VISIT MOD MDM: CPT | Mod: 25

## 2023-01-20 PROCEDURE — 87040 BLOOD CULTURE FOR BACTERIA: CPT | Performed by: EMERGENCY MEDICINE

## 2023-01-20 PROCEDURE — 93005 ELECTROCARDIOGRAM TRACING: CPT | Performed by: EMERGENCY MEDICINE

## 2023-01-20 PROCEDURE — 71045 X-RAY EXAM CHEST 1 VIEW: CPT

## 2023-01-20 PROCEDURE — 72125 CT NECK SPINE W/O DYE: CPT | Mod: ME

## 2023-01-20 PROCEDURE — 87086 URINE CULTURE/COLONY COUNT: CPT | Performed by: EMERGENCY MEDICINE

## 2023-01-20 PROCEDURE — 85025 COMPLETE CBC W/AUTO DIFF WBC: CPT | Performed by: EMERGENCY MEDICINE

## 2023-01-20 RX ADMIN — SODIUM CHLORIDE 1000 ML: 9 INJECTION, SOLUTION INTRAVENOUS at 03:00

## 2023-01-20 NOTE — DISCHARGE INSTRUCTIONS
You should drink plenty of fluids and return to the [facility] for further treatment of your opioid and benzodiazepine addiction.  If you develop any new or recurrent symptoms, especially any fevers or recurrent fainting spells you should return to the emergency department for further evaluation.

## 2023-01-20 NOTE — ED PROVIDER NOTES
"Emergency Medicine Note  HPI   HISTORY OF PRESENT ILLNESS     57-year-old female with history of diabetes, depression, chronic pain, history of opioid and benzo dependence presents to the emergency department after having a syncopal episode this evening.  The patient reports the episode began within a few minutes after taking her evening medicines which included trazodone and melatonin, and serax as well as clonidine and her other normal blood pressure medications.  She reports a few minutes later she felt hot and flushed and became lightheaded and reportedly passed out and struck her face on the ground.  She had brief loss of consciousness. Has been at [facility] for past 5 days - trying to wean off of benzo's. Reports chest felt \"funny\" but no pain, SOB, or palpitations.             Patient History   PAST HISTORY     Reviewed from Nursing Triage:       Past Medical History:   Diagnosis Date   • Abdominal pain    • Acute bacterial conjunctivitis of both eyes 1/29/2020   • Acute conjunctivitis of both eyes 6/19/2018   • Acute JACKIE (middle ear effusion), right 9/3/2019   • Acute non-recurrent maxillary sinusitis 1/29/2020   • Acute URI 3/22/2019   • Anxiety    • Back pain    • Hypertension    • MDD (major depressive disorder)    • Parotitis 9/10/2018   • Sinobronchitis 1/29/2019   • Type 2 diabetes mellitus (CMS/[facility])        Past Surgical History:   Procedure Laterality Date   • KNEE SURGERY     • TONSILLECTOMY         Family History   Problem Relation Age of Onset   • Heart disease Biological Mother    • No Known Problems Biological Father        Social History     Tobacco Use   • Smoking status: Every Day     Packs/day: 0.50     Years: 40.00     Pack years: 20.00     Types: Cigarettes   • Smokeless tobacco: Never   Vaping Use   • Vaping Use: Never used   Substance Use Topics   • Alcohol use: No   • Drug use: Yes     Types: Benzodiazepines, Fentanyl, Oxycodone         Review of Systems   REVIEW OF SYSTEMS     Review of " Systems   Cardiovascular: Negative for chest pain and palpitations.   Neurological: Positive for light-headedness and headaches.   All other systems reviewed and are negative.        VITALS     ED Vitals    Date/Time Temp Pulse Resp BP SpO2 [facility]   01/20/23 0145 -- 68 18 97/54 100 % VR   01/20/23 0115 36.5 °C (97.7 °F) 70 18 70/50 99 % CLK                       Physical Exam   PHYSICAL EXAM     Physical Exam  Vitals and nursing note reviewed.   Constitutional:       Appearance: Normal appearance. She is not toxic-appearing.   HENT:      Head: Normocephalic.      Comments: Lip contusion     Mouth/Throat:      Mouth: Mucous membranes are dry.   Eyes:      Extraocular Movements: Extraocular movements intact.      Pupils: Pupils are equal, round, and reactive to light.   Cardiovascular:      Rate and Rhythm: Normal rate.      Pulses: Normal pulses.      Heart sounds: Normal heart sounds.   Pulmonary:      Effort: Pulmonary effort is normal.      Breath sounds: Normal breath sounds.   Abdominal:      Palpations: Abdomen is soft.      Tenderness: There is no abdominal tenderness. There is no guarding or rebound.   Musculoskeletal:         General: Normal range of motion.      Cervical back: Normal range of motion and neck supple. No tenderness.   Skin:     General: Skin is warm and dry.      Capillary Refill: Capillary refill takes less than 2 seconds.      Findings: No rash.   Neurological:      General: No focal deficit present.      Mental Status: She is alert.   Psychiatric:         Mood and Affect: Mood normal.         Behavior: Behavior normal.           PROCEDURES     Procedures     DATA     Results     Procedure Component Value Units Date/Time    Type and Screen [facility] [[id number]] Collected: 01/20/23 0143    Specimen: Blood, Venous Updated: 01/20/23 0241     Specimen Expiration 01/23/2023     Antibody Screen Negative     ABO A     Rh Factor Positive     History Check No type on file    CBC and differential  [[id number]]  (Abnormal) Collected: 01/20/23 0143    Specimen: Blood, Venous Updated: 01/20/23 0226     WBC 18.28 K/uL      RBC 5.14 M/uL      Hemoglobin 15.5 g/dL      Hematocrit 46.1 %      MCV 89.7 fL      MCH 30.2 pg      MCHC 33.6 g/dL      RDW 12.2 %      Platelets 488 K/uL      MPV 9.9 fL      Differential Type Manu     Neutrophils 58 %      Lymphocytes 29 %      Monocytes 8 %      Eosinophils 4 %      Basophils 1 %      Neutrophils, Absolute 10.60 K/uL      Lymphocytes, Absolute 5.30 K/uL      Monocytes, Absolute 1.46 K/uL      Eosinophils, Absolute 0.73 K/uL      Basophils, Absolute 0.18 K/uL      Platelet Estimate Increased (>400,000)     Giant Platelets Occasional     Clumped Platelets Present     Ovalocytes Occasional     [place] Cells Occasional    Ethanol [[id number]]  (Normal) Collected: 01/20/23 0143    Specimen: Blood, Venous Updated: 01/20/23 0213     Ethanol <5 mg/dL     Comprehensive metabolic panel [[id number]]  (Abnormal) Collected: 01/20/23 0143    Specimen: Blood, Venous Updated: 01/20/23 0209     Sodium 136 mEQ/L      Potassium 3.5 mEQ/L      Comment: Results obtained on plasma. Plasma Potassium values may be up to 0.4 mEQ/L less than serum values. The differences may be greater for patients with high platelet or white cell counts.        Chloride 102 mEQ/L      CO2 22 mEQ/L      BUN 21 mg/dL      Creatinine 1.0 mg/dL      Glucose 198 mg/dL      Calcium 9.7 mg/dL      AST (SGOT) 17 IU/L      ALT (SGPT) 17 IU/L      Alkaline Phosphatase 47 IU/L      Total Protein 7.8 g/dL      Comment: Test performed on plasma which typically contains approximately 0.4 g/dL more protein than serum.        Albumin 4.4 g/dL      Bilirubin, Total 0.5 mg/dL      eGFR 56.9 mL/min/1.73m*2      Anion Gap 12 mEQ/L     RAINBOW LT BLUE [[id number]] Collected: 01/20/23 0143    Specimen: Blood, Venous Updated: 01/20/23 0150    Extra Tubes [[id number]] Collected: 01/20/23 0143    Specimen: Blood, Venous Updated: 01/20/23 0149     Narrative:      The following orders were created for panel order Extra Tubes.  Procedure                               Abnormality         Status                     ---------                               -----------         ------                     Extra Tube Lt Green[[id number]]                              In process                   Please view results for these tests on the individual orders.    Extra Tube Lt Green [[id number]] Collected: 01/20/23 0143    Specimen: Blood, Venous Updated: 01/20/23 0149    RAINBOW GOLD [[id number]] Collected: 01/20/23 0143    Specimen: Blood, Venous Updated: 01/20/23 0149    [place] Draw Panel [[id number]] Collected: 01/20/23 0143    Specimen: Blood, Venous Updated: 01/20/23 0149    Narrative:      The following orders were created for panel order [place] Draw Panel.  Procedure                               Abnormality         Status                     ---------                               -----------         ------                     RAINBOW RED[[id number]]                                      In process                 RAINBOW LT BLUE[[id number]]                                  In process                 RAINBOW GOLD[[id number]]                                     In process                   Please view results for these tests on the individual orders.    RAINBOW RED [[id number]] Collected: 01/20/23 0143    Specimen: Blood, Venous Updated: 01/20/23 0149          Imaging Results    None         ECG 12 lead    (Results Pending)       Scoring tools                                  ED Course & MDM   MDM / ED COURSE / CLINICAL IMPRESSION / DISPO     Medical Decision Making  58 yo F p/w recent syncopal episode this evening at [facility] after taking evening meds. Noted to have low BP on arrival. No seizure activity noted. Pt denies preceding CP or SOB or palpitations. Dry appearing on exam. Suspect pt could have had vasovagal episode or orthostasis, polypharmacy. Low susp for ACS, PE,  CVA, arrhythmia, sepsis/ PNA. Plan head/c-spine CT, EKG, labs, IV hydration and will reassess.    Amount and/or Complexity of Data Reviewed  Labs: ordered. Decision-making details documented in ED Course.  Radiology: ordered.  ECG/medicine tests: ordered.          ED Course as of 01/20/23 0732   Fri Jan 20, 2023   0657 UA wnl. Await repeat lactate Blood pressure is improved with IV fluids. [ROSIBEL]   0731 WBC(!): 18.28 [ROSIBEL]   0731 Lactate(!): 2.5 [ROSIBEL]   0731  The patient is feeling better now aside from headache.  The scans of her head neck and face were negative for any acute injury. BP improved. Awaiting callback from [facility], repeat lactate.  [ROSIBEL]      ED Course User Index  [ROSIBEL] Adrián Wallace MD     Clinical Impression      None               Adrián Wallace MD  01/20/23 0325       Adrián Wallace MD  01/20/23 0738       Adrián Wallace MD  01/31/23 1402

## 2023-01-21 LAB
ATRIAL RATE: 70
BACTERIA UR CULT: NORMAL
P AXIS: 39
PR INTERVAL: 184
QRS DURATION: 92
QT INTERVAL: 432
QTC CALCULATION(BAZETT): 466
R AXIS: 52
T WAVE AXIS: -2
VENTRICULAR RATE: 70

## 2023-01-24 LAB
BACTERIA BLD CULT: NORMAL
BACTERIA BLD CULT: NORMAL

## 2023-01-31 ASSESSMENT — ENCOUNTER SYMPTOMS
PALPITATIONS: 0
LIGHT-HEADEDNESS: 1
HEADACHES: 1

## 2023-03-23 ENCOUNTER — OFFICE VISIT (OUTPATIENT)
Dept: FAMILY MEDICINE | Facility: CLINIC | Age: 58
End: 2023-03-23
Payer: MEDICARE

## 2023-03-23 VITALS
WEIGHT: 180 LBS | HEIGHT: 65 IN | HEART RATE: 82 BPM | BODY MASS INDEX: 29.99 KG/M2 | DIASTOLIC BLOOD PRESSURE: 90 MMHG | SYSTOLIC BLOOD PRESSURE: 138 MMHG | RESPIRATION RATE: 16 BRPM | OXYGEN SATURATION: 98 %

## 2023-03-23 DIAGNOSIS — G89.4 CHRONIC PAIN SYNDROME: Primary | ICD-10-CM

## 2023-03-23 DIAGNOSIS — R92.8 ABNORMAL MAMMOGRAM: ICD-10-CM

## 2023-03-23 DIAGNOSIS — E11.9 TYPE 2 DIABETES MELLITUS WITHOUT COMPLICATION, WITHOUT LONG-TERM CURRENT USE OF INSULIN (CMS/HCC): ICD-10-CM

## 2023-03-23 DIAGNOSIS — F33.1 MDD (MAJOR DEPRESSIVE DISORDER), RECURRENT EPISODE, MODERATE (CMS/HCC): ICD-10-CM

## 2023-03-23 DIAGNOSIS — Z78.9 EX-SMOKER FOR LESS THAN 1 YEAR: ICD-10-CM

## 2023-03-23 DIAGNOSIS — I10 HYPERTENSION, ESSENTIAL: ICD-10-CM

## 2023-03-23 DIAGNOSIS — F41.9 ANXIETY: ICD-10-CM

## 2023-03-23 PROBLEM — Z20.822 SUSPECTED COVID-19 VIRUS INFECTION: Status: RESOLVED | Noted: 2020-07-23 | Resolved: 2023-03-23

## 2023-03-23 PROBLEM — R10.9 UNSPECIFIED ABDOMINAL PAIN: Status: RESOLVED | Noted: 2017-07-05 | Resolved: 2023-03-23

## 2023-03-23 PROBLEM — R23.8 BLISTERS OF MULTIPLE SITES: Status: RESOLVED | Noted: 2022-04-03 | Resolved: 2023-03-23

## 2023-03-23 PROBLEM — M54.41 ACUTE LOW BACK PAIN WITH BILATERAL SCIATICA: Status: RESOLVED | Noted: 2018-06-05 | Resolved: 2023-03-23

## 2023-03-23 PROBLEM — M54.42 ACUTE LOW BACK PAIN WITH BILATERAL SCIATICA: Status: RESOLVED | Noted: 2018-06-05 | Resolved: 2023-03-23

## 2023-03-23 PROBLEM — R53.83 FATIGUE: Status: RESOLVED | Noted: 2018-10-30 | Resolved: 2023-03-23

## 2023-03-23 PROCEDURE — G8752 SYS BP LESS 140: HCPCS | Performed by: FAMILY MEDICINE

## 2023-03-23 PROCEDURE — 99214 OFFICE O/P EST MOD 30 MIN: CPT | Performed by: FAMILY MEDICINE

## 2023-03-23 PROCEDURE — G8755 DIAS BP > OR = 90: HCPCS | Performed by: FAMILY MEDICINE

## 2023-03-23 RX ORDER — ALPRAZOLAM 0.25 MG/1
TABLET ORAL
Qty: 45 TABLET | Refills: 0
Start: 2023-03-23 | End: 2023-04-07 | Stop reason: SDUPTHER

## 2023-03-23 RX ORDER — METFORMIN HYDROCHLORIDE 500 MG/1
500 TABLET ORAL
COMMUNITY
End: 2023-04-07 | Stop reason: SDUPTHER

## 2023-03-23 NOTE — ASSESSMENT & PLAN NOTE
Blood work is ordered on Ozempic 0.5 mg weekly and with metformin as needed for elevated blood sugars which is infrequently.

## 2023-03-23 NOTE — ASSESSMENT & PLAN NOTE
She continues to be traumatized by the event in the recovery center in January but is managing well and she continues to slowly wean her alprazolam which is down to 0.25 mg in the morning and 0.5 mg at night.  With her next prescription we will change her dose to the 0.25 mg as she continues to wean and continue Lexapro 20 mg daily.  She is also getting counseling every other week.

## 2023-03-23 NOTE — PROGRESS NOTES
"      Subjective      Patient ID: Hilda Cornelius is a 57 y.o. female.  1965      This 57-year-old  female presents for follow-up almost 5 months since I last saw her.  She describes a harrowing experience in late January when she admitted herself to recovery [facility] and given to detox from benzodiazepines.  She found the care to be suboptimal and suffered a syncopal episode for which she was transferred to [facility] where she was found to be hypotensive.  She was hydrated and sent back to the detox center when she checked herself out of the next day.  Since that time she has weaned her alprazolam down to quarter milligram in the morning and half a milligram at night and continues to take oxycodone 10 mg up to 3 times a day and fentanyl 25 mg daily managed by her pain management provider.  She says her mood is fair although she continues to feel traumatized by her experience in the detox center.  She is back to getting counseling at [facility].  She is doing well on Ozempic and rarely uses metformin, only when her fasting blood sugars are above 140.  She is no longer taking meloxicam and uses ibuprofen over-the-counter 600 mg twice daily on average since starting physical therapy 5 weeks ago which has increased her pain.      The following have been reviewed and updated as appropriate in this visit:   Tobacco  Allergies  Meds  Problems  Med Hx  Surg Hx  Fam Hx       Review of Systems    Objective     Vitals:    03/23/23 1605   BP: 138/90   BP Location: Right upper arm   Patient Position: Sitting   Pulse: 82   Resp: 16   SpO2: 98%   Weight: 81.6 kg (180 lb)   Height: 1.651 m (5' 5\")     Body mass index is 29.95 kg/m².    Physical Exam  Vitals reviewed.   Constitutional:       General: She is not in acute distress.     Appearance: She is normal weight. She is not ill-appearing.      Comments: Her weight is stable and she was not further examined.   Neurological:      " Mental Status: She is alert.   Psychiatric:         Attention and Perception: Attention and perception normal.         Mood and Affect: Mood is anxious.         Speech: Speech normal.         Behavior: Behavior normal. Behavior is cooperative.         Thought Content: Thought content normal.         Cognition and Memory: Cognition and memory normal.         Judgment: Judgment normal.         Assessment/Plan   Diagnoses and all orders for this visit:    Chronic pain syndrome (Primary)  Assessment & Plan:  Managed by pain management on fentanyl 25 mcg/h patches every 72 hours and oxycodone up to 10 mg 3 times a day.  He is currently getting physical therapy which has increased her pain somewhat but she uses ibuprofen over-the-counter 600 mg up to twice a day for this.  Renal function testing is ordered.      MDD (major depressive disorder), recurrent episode, moderate (CMS/[facility])  Assessment & Plan:  Controlled on Lexapro 20 mg daily.      Type 2 diabetes mellitus without complication, without long-term current use of insulin (CMS/[facility])  Assessment & Plan:  Blood work is ordered on Ozempic 0.5 mg weekly and with metformin as needed for elevated blood sugars which is infrequently.      Hypertension, essential  Assessment & Plan:  Blood pressures controlled on her current regiment of metoprolol succinate 100 mg daily, olmesartan 40 mg daily and hydrochlorothiazide 25 mg daily.  She did have a syncopal episode in rehab on January 20 on this regiment but at that time she was also dehydrated and on clonidine as well as various other medications for detox.  She will continue her current regimen.      Anxiety  Assessment & Plan:  She continues to be traumatized by the event in the recovery center in January but is managing well and she continues to slowly wean her alprazolam which is down to 0.25 mg in the morning and 0.5 mg at night.  With her next prescription we will change her dose to the 0.25 mg as she continues to wean  and continue Lexapro 20 mg daily.  She is also getting counseling every other week.    Orders:  -     ALPRAZolam (XANAX) 0.25 mg tablet; 1 p.o. every morning and 2 p.o. every afternoon.  45 tabs is a 30 day supply    Ex-smoker for less than 1 year  Assessment & Plan:  She has a 40-pack-year tobacco use history and states that her last cigarette was in August 2022.  She is aware that she needs a CT of the chest for lung cancer screening but will be changing insurances on April 1 and asks that we hold off until after that to ordered a CAT scan which we will.      Abnormal mammogram  Assessment & Plan:  BI-RADS 3 mammogram July 2022 for which she is aware she needs repeat testing in which she declines at this time until her new insurance kicks in next months.  She will let us know when she is ready for me to order this.

## 2023-03-23 NOTE — ASSESSMENT & PLAN NOTE
Blood pressures controlled on her current regiment of metoprolol succinate 100 mg daily, olmesartan 40 mg daily and hydrochlorothiazide 25 mg daily.  She did have a syncopal episode in rehab on January 20 on this regiment but at that time she was also dehydrated and on clonidine as well as various other medications for detox.  She will continue her current regimen.

## 2023-03-23 NOTE — ASSESSMENT & PLAN NOTE
Managed by pain management on fentanyl 25 mcg/h patches every 72 hours and oxycodone up to 10 mg 3 times a day.  He is currently getting physical therapy which has increased her pain somewhat but she uses ibuprofen over-the-counter 600 mg up to twice a day for this.  Renal function testing is ordered.

## 2023-03-23 NOTE — ASSESSMENT & PLAN NOTE
She has a 40-pack-year tobacco use history and states that her last cigarette was in August 2022.  She is aware that she needs a CT of the chest for lung cancer screening but will be changing insurances on April 1 and asks that we hold off until after that to ordered a CAT scan which we will.

## 2023-03-23 NOTE — ASSESSMENT & PLAN NOTE
BI-RADS 3 mammogram July 2022 for which she is aware she needs repeat testing in which she declines at this time until her new insurance kicks in next months.  She will let us know when she is ready for me to order this.

## 2023-04-06 DIAGNOSIS — K29.00 OTHER ACUTE GASTRITIS WITHOUT HEMORRHAGE: ICD-10-CM

## 2023-04-06 DIAGNOSIS — E11.9 TYPE 2 DIABETES MELLITUS WITHOUT COMPLICATION, WITHOUT LONG-TERM CURRENT USE OF INSULIN (CMS/HCC): ICD-10-CM

## 2023-04-06 DIAGNOSIS — I10 HYPERTENSION, ESSENTIAL: Primary | ICD-10-CM

## 2023-04-06 DIAGNOSIS — R92.8 ABNORMAL MAMMOGRAM: ICD-10-CM

## 2023-04-06 DIAGNOSIS — F41.9 ANXIETY: ICD-10-CM

## 2023-04-06 DIAGNOSIS — D72.820 LYMPHOCYTOSIS: ICD-10-CM

## 2023-04-06 DIAGNOSIS — Z12.31 ENCOUNTER FOR SCREENING MAMMOGRAM FOR MALIGNANT NEOPLASM OF BREAST: ICD-10-CM

## 2023-04-06 DIAGNOSIS — F33.1 MDD (MAJOR DEPRESSIVE DISORDER), RECURRENT EPISODE, MODERATE (CMS/HCC): ICD-10-CM

## 2023-04-06 NOTE — TELEPHONE ENCOUNTER
Medicine Refill Request    Last Office: Visit date not found   Last Consult Visit: Visit date not found  Last Telemedicine Visit: Visit date not found    Next Appointment: Visit date not found      Current Outpatient Medications:   •  ALPRAZolam (XANAX) 0.25 mg tablet, 1 p.o. every morning and 2 p.o. every afternoon.  45 tabs is a 30 day supply, Disp: 45 tablet, Rfl: 0  •  ammonium lactate (LAC-HYDRIN) 12 % lotion, APPLY A THIN LAYER TO BODY AFTER SHOWER DAILY, Disp: , Rfl:   •  blood-glucose meter (PRECISION XTRA MONITOR) misc, 1 strip 2 (two) times a day., Disp: 1 each, Rfl: 0  •  clobetasoL 0.05 % lotion, SCATTER DROPS ON SCALP AND MASSAGE INTO SKIN, Disp: , Rfl:   •  escitalopram (LEXAPRO) 20 mg tablet, TAKE 1 TABLET BY MOUTH EVERY DAY, Disp: 30 tablet, Rfl: 3  •  fentaNYL (DURAGESIC) 25 mcg/hr, Place 1 patch on the skin See admin instr. Apply 1 patch topically every 72 hours. Use with 12mcg, for 37 mcg total., Disp: 10 patch, Rfl: 0  •  FREESTYLE THOM 2 SENSOR kit, PLACE 1 DEVICE ON THE SKIN EVERY 2 WEEKS., Disp: , Rfl:   •  hydrochlorothiazide (HYDRODIURIL) 25 mg tablet, TAKE 1 TABLET BY MOUTH EVERY DAY, Disp: 30 tablet, Rfl: 3  •  lancets (freestyle) 28 gauge misc, 1 strip 2 (two) times a day., Disp: 100 each, Rfl: 3  •  lansoprazole (PREVACID) 30 mg capsule, TAKE 1 CAPSULE BY MOUTH DAILY BEFORE BREAKFAST., Disp: 30 capsule, Rfl: 2  •  meloxicam (MOBIC) 15 mg tablet, TAKE 1 TABLET DAILY IF NEEDED WITH A FULL STOMACH, Disp: , Rfl:   •  metFORMIN (GLUCOPHAGE) 500 mg tablet, Take 500 mg by mouth. As needed, Disp: , Rfl:   •  metoprolol succinate XL (TOPROL-XL) 100 mg 24 hr tablet, TAKE 1 TABLET BY MOUTH EVERY DAY, Disp: 30 tablet, Rfl: 3  •  olmesartan (BENICAR) 40 mg tablet, TAKE 1 TABLET BY MOUTH EVERY DAY, Disp: 30 tablet, Rfl: 3  •  oxyCODONE (OXY-IR) 5 mg capsule, TAKE 1-2 CAPSULES BY MOUTH EVERY 8 HOURS AS NEEDED PAIN, Disp: , Rfl:   •  semaglutide (OZEMPIC) 0.25 mg or 0.5 mg(2 mg/1.5 mL) pen injector,  Inject 0.25 mg under skin once a week for 4 weeks, then increase to 0.5 mg once a week., Disp: , Rfl:       BP Readings from Last 3 Encounters:   03/23/23 138/90   01/20/23 126/72   11/07/22 130/78       Recent Lab results:  Lab Results   Component Value Date    CHOL 217 (H) 08/03/2022   ,   Lab Results   Component Value Date    HDL 41 08/03/2022   ,   Lab Results   Component Value Date    LDLCALC 124 (H) 08/03/2022   ,   Lab Results   Component Value Date    TRIG 292 (H) 08/03/2022        Lab Results   Component Value Date    GLUCOSE 198 (H) 01/20/2023   ,   Lab Results   Component Value Date    HGBA1C 7.5 (H) 08/03/2022         Lab Results   Component Value Date    CREATININE 1.0 01/20/2023       Lab Results   Component Value Date    TSH 4.890 (H) 12/30/2019

## 2023-04-06 NOTE — TELEPHONE ENCOUNTER
Patient is asking now that she has changed her insurance, if the doctor could order her lab work, mammogram and also send her medication to the pharmacy as she was told to call when she got her new insurance. She said she might come in to do a medical release for her paperwork for disability today as well. Please call patient back.

## 2023-04-07 RX ORDER — ALPRAZOLAM 0.25 MG/1
TABLET ORAL
Qty: 45 TABLET | Refills: 0 | Status: CANCELLED | OUTPATIENT
Start: 2023-04-07

## 2023-04-07 RX ORDER — CLOBETASOL PROPIONATE 0.5 MG/ML
LOTION TOPICAL 2 TIMES DAILY PRN
Qty: 60 ML | Refills: 1 | Status: SHIPPED | OUTPATIENT
Start: 2023-04-07 | End: 2023-08-14

## 2023-04-07 RX ORDER — ESCITALOPRAM OXALATE 20 MG/1
20 TABLET ORAL DAILY
Qty: 90 TABLET | Refills: 1 | Status: SHIPPED | OUTPATIENT
Start: 2023-04-07 | End: 2023-10-26

## 2023-04-07 RX ORDER — HYDROCHLOROTHIAZIDE 25 MG/1
25 TABLET ORAL DAILY
Qty: 30 TABLET | Refills: 3 | Status: CANCELLED | OUTPATIENT
Start: 2023-04-07

## 2023-04-07 RX ORDER — AMMONIUM LACTATE 12 G/100G
LOTION TOPICAL 2 TIMES DAILY PRN
Qty: 400 G | Refills: 1 | Status: SHIPPED | OUTPATIENT
Start: 2023-04-07 | End: 2023-08-14

## 2023-04-07 RX ORDER — ESCITALOPRAM OXALATE 20 MG/1
20 TABLET ORAL DAILY
Qty: 30 TABLET | Refills: 3 | Status: CANCELLED | OUTPATIENT
Start: 2023-04-07

## 2023-04-07 RX ORDER — AMMONIUM LACTATE 12 G/100G
LOTION TOPICAL AS NEEDED
Qty: 400 G | Refills: 0 | Status: CANCELLED | OUTPATIENT
Start: 2023-04-07

## 2023-04-07 RX ORDER — ALPRAZOLAM 0.25 MG/1
TABLET ORAL
Qty: 45 TABLET | Refills: 0 | Status: SHIPPED | OUTPATIENT
Start: 2023-04-07 | End: 2023-05-22

## 2023-04-07 RX ORDER — METOPROLOL SUCCINATE 100 MG/1
100 TABLET, EXTENDED RELEASE ORAL DAILY
Qty: 30 TABLET | Refills: 3 | Status: CANCELLED | OUTPATIENT
Start: 2023-04-07

## 2023-04-07 RX ORDER — METFORMIN HYDROCHLORIDE 500 MG/1
500 TABLET ORAL 2 TIMES DAILY WITH MEALS
Qty: 180 TABLET | Refills: 1 | Status: SHIPPED | OUTPATIENT
Start: 2023-04-07 | End: 2023-08-14

## 2023-04-07 RX ORDER — OXYCODONE HYDROCHLORIDE 5 MG/1
5 CAPSULE ORAL
Refills: 0 | Status: CANCELLED | OUTPATIENT
Start: 2023-04-07

## 2023-04-07 RX ORDER — CLOBETASOL PROPIONATE 0.5 MG/ML
LOTION TOPICAL 2 TIMES DAILY
Qty: 59 ML | Refills: 0 | Status: CANCELLED | OUTPATIENT
Start: 2023-04-07

## 2023-04-07 RX ORDER — HYDROCHLOROTHIAZIDE 25 MG/1
25 TABLET ORAL DAILY
Qty: 90 TABLET | Refills: 1 | Status: SHIPPED | OUTPATIENT
Start: 2023-04-07 | End: 2023-10-26

## 2023-04-07 RX ORDER — LANSOPRAZOLE 30 MG/1
30 CAPSULE, DELAYED RELEASE ORAL
Qty: 30 CAPSULE | Refills: 2 | Status: CANCELLED | OUTPATIENT
Start: 2023-04-07

## 2023-04-07 RX ORDER — LANSOPRAZOLE 30 MG/1
30 CAPSULE, DELAYED RELEASE ORAL
Qty: 90 CAPSULE | Refills: 1 | Status: SHIPPED | OUTPATIENT
Start: 2023-04-07 | End: 2023-10-02

## 2023-04-22 LAB
25(OH)D3+25(OH)D2 SERPL-MCNC: 10.2 NG/ML (ref 30–100)
ALBUMIN SERPL-MCNC: 4.3 G/DL (ref 3.8–4.9)
ALBUMIN/CREAT UR: 16 MG/G CREAT (ref 0–29)
ALBUMIN/GLOB SERPL: 1.8 {RATIO} (ref 1.2–2.2)
ALP SERPL-CCNC: 48 IU/L (ref 44–121)
ALT SERPL-CCNC: 19 IU/L (ref 0–32)
AST SERPL-CCNC: 20 IU/L (ref 0–40)
BASOPHILS # BLD AUTO: 0.1 X10E3/UL (ref 0–0.2)
BASOPHILS NFR BLD AUTO: 1 %
BILIRUB SERPL-MCNC: 0.3 MG/DL (ref 0–1.2)
BUN SERPL-MCNC: 15 MG/DL (ref 6–24)
BUN/CREAT SERPL: 26 (ref 9–23)
CALCIUM SERPL-MCNC: 9.5 MG/DL (ref 8.7–10.2)
CHLORIDE SERPL-SCNC: 104 MMOL/L (ref 96–106)
CHOLEST SERPL-MCNC: 190 MG/DL (ref 100–199)
CO2 SERPL-SCNC: 22 MMOL/L (ref 20–29)
CREAT SERPL-MCNC: 0.57 MG/DL (ref 0.57–1)
CREAT UR-MCNC: 83.2 MG/DL
EGFRCR SERPLBLD CKD-EPI 2021: 105 ML/MIN/1.73
EOSINOPHIL # BLD AUTO: 0.2 X10E3/UL (ref 0–0.4)
EOSINOPHIL NFR BLD AUTO: 3 %
ERYTHROCYTE [DISTWIDTH] IN BLOOD BY AUTOMATED COUNT: 13.6 % (ref 11.7–15.4)
FOLATE SERPL-MCNC: 6.6 NG/ML
GLOBULIN SER CALC-MCNC: 2.4 G/DL (ref 1.5–4.5)
GLUCOSE SERPL-MCNC: 143 MG/DL (ref 70–99)
HBA1C MFR BLD: 7 % (ref 4.8–5.6)
HCT VFR BLD AUTO: 39.3 % (ref 34–46.6)
HDLC SERPL-MCNC: 39 MG/DL
HGB BLD-MCNC: 13.1 G/DL (ref 11.1–15.9)
IMM GRANULOCYTES # BLD AUTO: 0 X10E3/UL (ref 0–0.1)
IMM GRANULOCYTES NFR BLD AUTO: 0 %
LDLC SERPL CALC-MCNC: 107 MG/DL (ref 0–99)
LYMPHOCYTES # BLD AUTO: 4.2 X10E3/UL (ref 0.7–3.1)
LYMPHOCYTES NFR BLD AUTO: 51 %
MCH RBC QN AUTO: 30.4 PG (ref 26.6–33)
MCHC RBC AUTO-ENTMCNC: 33.3 G/DL (ref 31.5–35.7)
MCV RBC AUTO: 91 FL (ref 79–97)
MICROALBUMIN UR-MCNC: 13.4 UG/ML
MONOCYTES # BLD AUTO: 0.8 X10E3/UL (ref 0.1–0.9)
MONOCYTES NFR BLD AUTO: 10 %
MORPHOLOGY BLD-IMP: ABNORMAL
NEUTROPHILS # BLD AUTO: 2.8 X10E3/UL (ref 1.4–7)
NEUTROPHILS NFR BLD AUTO: 35 %
PLATELET # BLD AUTO: 340 X10E3/UL (ref 150–450)
POTASSIUM SERPL-SCNC: 4.4 MMOL/L (ref 3.5–5.2)
PROT SERPL-MCNC: 6.7 G/DL (ref 6–8.5)
RBC # BLD AUTO: 4.31 X10E6/UL (ref 3.77–5.28)
SODIUM SERPL-SCNC: 142 MMOL/L (ref 134–144)
T4 FREE SERPL-MCNC: 1.18 NG/DL (ref 0.82–1.77)
TRIGL SERPL-MCNC: 256 MG/DL (ref 0–149)
TSH SERPL DL<=0.005 MIU/L-ACNC: 5.15 UIU/ML (ref 0.45–4.5)
VIT B12 SERPL-MCNC: 546 PG/ML (ref 232–1245)
VLDLC SERPL CALC-MCNC: 44 MG/DL (ref 5–40)
WBC # BLD AUTO: 8.2 X10E3/UL (ref 3.4–10.8)

## 2023-04-26 ENCOUNTER — DOCUMENTATION (OUTPATIENT)
Dept: FAMILY MEDICINE | Facility: CLINIC | Age: 58
End: 2023-04-26
Payer: COMMERCIAL

## 2023-04-26 LAB
6MAM UR QL SCN: NEGATIVE NG/ML
ACCEPTABLE CREAT UR QL: 86.8 MG/DL
AMPHET UR QL SCN: NEGATIVE NG/ML
BARBITURATES UR QL SCN: NEGATIVE NG/ML
BENZODIAZ UR QL SCN: NORMAL NG/ML
BUPRENORPHINE UR QL: NEGATIVE NG/ML
CANNABINOIDS UR QL SCN: NEGATIVE NG/ML
CARISOPRODOL UR QL: NEGATIVE NG/ML
COCAINE+BZE UR QL SCN: NEGATIVE NG/ML
ETHYL GLUCURONIDE UR QL SCN: NEGATIVE NG/ML
FENTANYL UR QL SCN: NORMAL NG/ML
GABAPENTIN SERPLBLD QL SCN: NEGATIVE UG/ML
LC OXYCODONE: POSITIVE
LC OXYCODONE: POSITIVE NG/ML
LC PLEASE NOTE:: NORMAL
Lab: 138.4 NG/ML
Lab: 1482 NG/ML
Lab: 150 NG/ML
Lab: 154 NG/ML
Lab: 7.3 NG/ML
Lab: NEGATIVE
Lab: NEGATIVE NG/ML
Lab: POSITIVE
Lab: POSITIVE NG/ML
Lab: POSITIVE NG/ML
METHADONE UR QL SCN: NEGATIVE NG/ML
NITRITE UR QL: NEGATIVE UG/ML
OPIATES UR QL SCN: NORMAL NG/ML
OXYCODONE+OXYMORPHONE UR QL SCN: NORMAL NG/ML
PCP UR QL SCN: NEGATIVE NG/ML
PROPOXYPH UR QL SCN: NEGATIVE NG/ML
SPECIMEN PH ACCEPTABLE UR: 5.4 (ref 4.5–8.9)
TAPENTADOL UR QL SCN: NEGATIVE NG/ML
TRAMADOL UR QL SCN: NEGATIVE NG/ML

## 2023-05-22 DIAGNOSIS — F41.9 ANXIETY: ICD-10-CM

## 2023-05-22 RX ORDER — ALPRAZOLAM 0.25 MG/1
0.25 TABLET ORAL 2 TIMES DAILY PRN
Qty: 60 TABLET | Refills: 0 | Status: SHIPPED | OUTPATIENT
Start: 2023-05-22 | End: 2023-06-26 | Stop reason: SDUPTHER

## 2023-05-22 NOTE — TELEPHONE ENCOUNTER
Medicine Refill Request    Last Office: 3/23/2023   Last Consult Visit: Visit date not found  Last Telemedicine Visit: 1/25/2021 Emily Sullivan MD    Next Appointment: Visit date not found      Current Outpatient Medications:   •  ALPRAZolam (XANAX) 0.25 mg tablet, 1 p.o. every morning and 2 p.o. every afternoon.  45 tabs is a 30 day supply, Disp: 45 tablet, Rfl: 0  •  ammonium lactate (LAC-HYDRIN) 12 % lotion, Apply topically 2 (two) times a day as needed for dry skin., Disp: 400 g, Rfl: 1  •  blood-glucose meter (PRECISION XTRA MONITOR) misc, 1 strip 2 (two) times a day., Disp: 1 each, Rfl: 0  •  clobetasoL 0.05 % lotion, Apply topically 2 (two) times a day as needed (itching or flaking)., Disp: 60 mL, Rfl: 1  •  escitalopram (LEXAPRO) 20 mg tablet, Take 1 tablet (20 mg total) by mouth daily., Disp: 90 tablet, Rfl: 1  •  fentaNYL (DURAGESIC) 25 mcg/hr, Place 1 patch on the skin See admin instr. Apply 1 patch topically every 72 hours. Use with 12mcg, for 37 mcg total., Disp: 10 patch, Rfl: 0  •  FREESTYLE THOM 2 SENSOR kit, PLACE 1 DEVICE ON THE SKIN EVERY 2 WEEKS., Disp: , Rfl:   •  hydrochlorothiazide (HYDRODIURIL) 25 mg tablet, Take 1 tablet (25 mg total) by mouth daily., Disp: 90 tablet, Rfl: 1  •  lancets (freestyle) 28 gauge misc, 1 strip 2 (two) times a day., Disp: 100 each, Rfl: 3  •  lansoprazole (PREVACID) 30 mg capsule, Take 1 capsule (30 mg total) by mouth daily before breakfast., Disp: 90 capsule, Rfl: 1  •  meloxicam (MOBIC) 15 mg tablet, TAKE 1 TABLET DAILY IF NEEDED WITH A FULL STOMACH, Disp: , Rfl:   •  metFORMIN (GLUCOPHAGE) 500 mg tablet, Take 1 tablet (500 mg total) by mouth 2 (two) times a day with meals. As needed, Disp: 180 tablet, Rfl: 1  •  metoprolol succinate XL (TOPROL-XL) 100 mg 24 hr tablet, TAKE 1 TABLET BY MOUTH EVERY DAY, Disp: 30 tablet, Rfl: 3  •  olmesartan (BENICAR) 40 mg tablet, TAKE 1 TABLET BY MOUTH EVERY DAY, Disp: 30 tablet, Rfl: 3  •  oxyCODONE (OXY-IR) 5 mg capsule,  TAKE 1-2 CAPSULES BY MOUTH EVERY 8 HOURS AS NEEDED PAIN, Disp: , Rfl:   •  semaglutide (OZEMPIC) 0.25 mg or 0.5 mg(2 mg/1.5 mL) pen injector, Inject 0.25 mg under skin once a week for 4 weeks, then increase to 0.5 mg once a week., Disp: , Rfl:       BP Readings from Last 3 Encounters:   03/23/23 138/90   01/20/23 126/72   11/07/22 130/78       Recent Lab results:  Lab Results   Component Value Date    CHOL 190 04/21/2023   ,   Lab Results   Component Value Date    HDL 39 (L) 04/21/2023   ,   Lab Results   Component Value Date    LDLCALC 107 (H) 04/21/2023   ,   Lab Results   Component Value Date    TRIG 256 (H) 04/21/2023        Lab Results   Component Value Date    GLUCOSE 143 (H) 04/21/2023   ,   Lab Results   Component Value Date    HGBA1C 7.0 (H) 04/21/2023         Lab Results   Component Value Date    CREATININE 0.57 04/21/2023       Lab Results   Component Value Date    TSH 5.150 (H) 04/21/2023

## 2023-06-26 DIAGNOSIS — F41.9 ANXIETY: ICD-10-CM

## 2023-06-26 RX ORDER — ALPRAZOLAM 0.25 MG/1
0.25 TABLET ORAL 2 TIMES DAILY PRN
Qty: 60 TABLET | Refills: 0 | Status: SHIPPED | OUTPATIENT
Start: 2023-06-26 | End: 2023-07-24 | Stop reason: SDUPTHER

## 2023-06-26 NOTE — TELEPHONE ENCOUNTER
Medicine Refill Request    Last Office: 3/23/2023   Last Consult Visit: Visit date not found  Last Telemedicine Visit: 1/25/2021 Emily Sullivan MD    Next Appointment: Visit date not found      Current Outpatient Medications:   •  ALPRAZolam (XANAX) 0.25 mg tablet, Take 1 tablet (0.25 mg total) by mouth 2 (two) times a day as needed for anxiety. Weaning:  New dose is 0.25 mg bid, Disp: 60 tablet, Rfl: 0  •  ammonium lactate (LAC-HYDRIN) 12 % lotion, Apply topically 2 (two) times a day as needed for dry skin., Disp: 400 g, Rfl: 1  •  blood-glucose meter (PRECISION XTRA MONITOR) misc, 1 strip 2 (two) times a day., Disp: 1 each, Rfl: 0  •  clobetasoL 0.05 % lotion, Apply topically 2 (two) times a day as needed (itching or flaking)., Disp: 60 mL, Rfl: 1  •  escitalopram (LEXAPRO) 20 mg tablet, Take 1 tablet (20 mg total) by mouth daily., Disp: 90 tablet, Rfl: 1  •  fentaNYL (DURAGESIC) 25 mcg/hr, Place 1 patch on the skin See admin instr. Apply 1 patch topically every 72 hours. Use with 12mcg, for 37 mcg total., Disp: 10 patch, Rfl: 0  •  FREESTYLE THOM 2 SENSOR kit, PLACE 1 DEVICE ON THE SKIN EVERY 2 WEEKS., Disp: , Rfl:   •  hydrochlorothiazide (HYDRODIURIL) 25 mg tablet, Take 1 tablet (25 mg total) by mouth daily., Disp: 90 tablet, Rfl: 1  •  lancets (freestyle) 28 gauge misc, 1 strip 2 (two) times a day., Disp: 100 each, Rfl: 3  •  lansoprazole (PREVACID) 30 mg capsule, Take 1 capsule (30 mg total) by mouth daily before breakfast., Disp: 90 capsule, Rfl: 1  •  meloxicam (MOBIC) 15 mg tablet, TAKE 1 TABLET DAILY IF NEEDED WITH A FULL STOMACH, Disp: , Rfl:   •  metFORMIN (GLUCOPHAGE) 500 mg tablet, Take 1 tablet (500 mg total) by mouth 2 (two) times a day with meals. As needed, Disp: 180 tablet, Rfl: 1  •  metoprolol succinate XL (TOPROL-XL) 100 mg 24 hr tablet, TAKE 1 TABLET BY MOUTH EVERY DAY, Disp: 30 tablet, Rfl: 3  •  olmesartan (BENICAR) 40 mg tablet, TAKE 1 TABLET BY MOUTH EVERY DAY, Disp: 30 tablet, Rfl:  3  •  oxyCODONE (OXY-IR) 5 mg capsule, TAKE 1-2 CAPSULES BY MOUTH EVERY 8 HOURS AS NEEDED PAIN, Disp: , Rfl:   •  semaglutide (OZEMPIC) 0.25 mg or 0.5 mg(2 mg/1.5 mL) pen injector, Inject 0.25 mg under skin once a week for 4 weeks, then increase to 0.5 mg once a week., Disp: , Rfl:       BP Readings from Last 3 Encounters:   03/23/23 138/90   01/20/23 126/72   11/07/22 130/78       Recent Lab results:  Lab Results   Component Value Date    CHOL 190 04/21/2023   ,   Lab Results   Component Value Date    HDL 39 (L) 04/21/2023   ,   Lab Results   Component Value Date    LDLCALC 107 (H) 04/21/2023   ,   Lab Results   Component Value Date    TRIG 256 (H) 04/21/2023        Lab Results   Component Value Date    GLUCOSE 143 (H) 04/21/2023   ,   Lab Results   Component Value Date    HGBA1C 7.0 (H) 04/21/2023         Lab Results   Component Value Date    CREATININE 0.57 04/21/2023       Lab Results   Component Value Date    TSH 5.150 (H) 04/21/2023

## 2023-07-20 DIAGNOSIS — I10 HYPERTENSION, ESSENTIAL: Primary | ICD-10-CM

## 2023-07-20 DIAGNOSIS — K29.00 OTHER ACUTE GASTRITIS WITHOUT HEMORRHAGE: ICD-10-CM

## 2023-07-20 RX ORDER — METOPROLOL SUCCINATE 100 MG/1
100 TABLET, EXTENDED RELEASE ORAL DAILY
Qty: 30 TABLET | Refills: 3 | Status: SHIPPED | OUTPATIENT
Start: 2023-07-20 | End: 2023-11-21

## 2023-07-20 RX ORDER — OLMESARTAN MEDOXOMIL 40 MG/1
40 TABLET ORAL DAILY
Qty: 30 TABLET | Refills: 3 | Status: SHIPPED | OUTPATIENT
Start: 2023-07-20 | End: 2023-11-21

## 2023-07-20 NOTE — TELEPHONE ENCOUNTER
Medicine Refill Request    Last Office Visit: 3/23/2023   Last Consult Visit: Visit date not found  Last Telemedicine Visit: 1/25/2021 Emily Sullivan MD    Next Appointment: Visit date not found      Current Outpatient Medications:   •  ALPRAZolam (XANAX) 0.25 mg tablet, Take 1 tablet (0.25 mg total) by mouth 2 (two) times a day as needed for anxiety. Weaning:  New dose is 0.25 mg bid, Disp: 60 tablet, Rfl: 0  •  ammonium lactate (LAC-HYDRIN) 12 % lotion, Apply topically 2 (two) times a day as needed for dry skin., Disp: 400 g, Rfl: 1  •  blood-glucose meter (PRECISION XTRA MONITOR) misc, 1 strip 2 (two) times a day., Disp: 1 each, Rfl: 0  •  clobetasoL 0.05 % lotion, Apply topically 2 (two) times a day as needed (itching or flaking)., Disp: 60 mL, Rfl: 1  •  escitalopram (LEXAPRO) 20 mg tablet, Take 1 tablet (20 mg total) by mouth daily., Disp: 90 tablet, Rfl: 1  •  fentaNYL (DURAGESIC) 25 mcg/hr, Place 1 patch on the skin See admin instr. Apply 1 patch topically every 72 hours. Use with 12mcg, for 37 mcg total., Disp: 10 patch, Rfl: 0  •  FREESTYLE THOM 2 SENSOR kit, PLACE 1 DEVICE ON THE SKIN EVERY 2 WEEKS., Disp: , Rfl:   •  hydrochlorothiazide (HYDRODIURIL) 25 mg tablet, Take 1 tablet (25 mg total) by mouth daily., Disp: 90 tablet, Rfl: 1  •  lancets (freestyle) 28 gauge misc, 1 strip 2 (two) times a day., Disp: 100 each, Rfl: 3  •  lansoprazole (PREVACID) 30 mg capsule, Take 1 capsule (30 mg total) by mouth daily before breakfast., Disp: 90 capsule, Rfl: 1  •  meloxicam (MOBIC) 15 mg tablet, TAKE 1 TABLET DAILY IF NEEDED WITH A FULL STOMACH, Disp: , Rfl:   •  metFORMIN (GLUCOPHAGE) 500 mg tablet, Take 1 tablet (500 mg total) by mouth 2 (two) times a day with meals. As needed, Disp: 180 tablet, Rfl: 1  •  metoprolol succinate XL (TOPROL-XL) 100 mg 24 hr tablet, TAKE 1 TABLET BY MOUTH EVERY DAY, Disp: 30 tablet, Rfl: 3  •  olmesartan (BENICAR) 40 mg tablet, TAKE 1 TABLET BY MOUTH EVERY DAY, Disp: 30  tablet, Rfl: 3  •  oxyCODONE (OXY-IR) 5 mg capsule, TAKE 1-2 CAPSULES BY MOUTH EVERY 8 HOURS AS NEEDED PAIN, Disp: , Rfl:   •  semaglutide (OZEMPIC) 0.25 mg or 0.5 mg(2 mg/1.5 mL) pen injector, Inject 0.25 mg under skin once a week for 4 weeks, then increase to 0.5 mg once a week., Disp: , Rfl:       BP Readings from Last 3 Encounters:   03/23/23 138/90   01/20/23 126/72   11/07/22 130/78       Recent Lab results:  Lab Results   Component Value Date    CHOL 190 04/21/2023   ,   Lab Results   Component Value Date    HDL 39 (L) 04/21/2023   ,   Lab Results   Component Value Date    LDLCALC 107 (H) 04/21/2023   ,   Lab Results   Component Value Date    TRIG 256 (H) 04/21/2023        Lab Results   Component Value Date    GLUCOSE 143 (H) 04/21/2023   ,   Lab Results   Component Value Date    HGBA1C 7.0 (H) 04/21/2023         Lab Results   Component Value Date    CREATININE 0.57 04/21/2023       Lab Results   Component Value Date    TSH 5.150 (H) 04/21/2023           Lab Results   Component Value Date    HGBA1C 7.0 (H) 04/21/2023

## 2023-07-24 DIAGNOSIS — F41.9 ANXIETY: ICD-10-CM

## 2023-07-24 RX ORDER — ALPRAZOLAM 0.25 MG/1
0.25 TABLET ORAL 2 TIMES DAILY PRN
Qty: 60 TABLET | Refills: 0 | Status: SHIPPED | OUTPATIENT
Start: 2023-07-24 | End: 2023-08-22 | Stop reason: SDUPTHER

## 2023-07-24 NOTE — TELEPHONE ENCOUNTER
Medicine Refill Request    Last Office Visit: 3/23/2023   Last Consult Visit: Visit date not found  Last Telemedicine Visit: 1/25/2021 Emily Sullivan MD    Next Appointment: 8/1/2023      Current Outpatient Medications:   •  ALPRAZolam (XANAX) 0.25 mg tablet, Take 1 tablet (0.25 mg total) by mouth 2 (two) times a day as needed for anxiety. Weaning:  New dose is 0.25 mg bid, Disp: 60 tablet, Rfl: 0  •  ammonium lactate (LAC-HYDRIN) 12 % lotion, Apply topically 2 (two) times a day as needed for dry skin., Disp: 400 g, Rfl: 1  •  blood-glucose meter (PRECISION XTRA MONITOR) misc, 1 strip 2 (two) times a day., Disp: 1 each, Rfl: 0  •  clobetasoL 0.05 % lotion, Apply topically 2 (two) times a day as needed (itching or flaking)., Disp: 60 mL, Rfl: 1  •  escitalopram (LEXAPRO) 20 mg tablet, Take 1 tablet (20 mg total) by mouth daily., Disp: 90 tablet, Rfl: 1  •  fentaNYL (DURAGESIC) 25 mcg/hr, Place 1 patch on the skin See admin instr. Apply 1 patch topically every 72 hours. Use with 12mcg, for 37 mcg total., Disp: 10 patch, Rfl: 0  •  FREESTYLE THOM 2 SENSOR kit, PLACE 1 DEVICE ON THE SKIN EVERY 2 WEEKS., Disp: , Rfl:   •  hydrochlorothiazide (HYDRODIURIL) 25 mg tablet, Take 1 tablet (25 mg total) by mouth daily., Disp: 90 tablet, Rfl: 1  •  lancets (freestyle) 28 gauge misc, 1 strip 2 (two) times a day., Disp: 100 each, Rfl: 3  •  lansoprazole (PREVACID) 30 mg capsule, Take 1 capsule (30 mg total) by mouth daily before breakfast., Disp: 90 capsule, Rfl: 1  •  meloxicam (MOBIC) 15 mg tablet, TAKE 1 TABLET DAILY IF NEEDED WITH A FULL STOMACH, Disp: , Rfl:   •  metFORMIN (GLUCOPHAGE) 500 mg tablet, Take 1 tablet (500 mg total) by mouth 2 (two) times a day with meals. As needed, Disp: 180 tablet, Rfl: 1  •  metoprolol succinate XL (TOPROL-XL) 100 mg 24 hr tablet, Take 1 tablet (100 mg total) by mouth daily., Disp: 30 tablet, Rfl: 3  •  olmesartan (BENICAR) 40 mg tablet, Take 1 tablet (40 mg total) by mouth daily.,  Disp: 30 tablet, Rfl: 3  •  oxyCODONE (OXY-IR) 5 mg capsule, TAKE 1-2 CAPSULES BY MOUTH EVERY 8 HOURS AS NEEDED PAIN, Disp: , Rfl:   •  semaglutide (OZEMPIC) 0.25 mg or 0.5 mg(2 mg/1.5 mL) pen injector, Inject 0.25 mg under skin once a week for 4 weeks, then increase to 0.5 mg once a week., Disp: , Rfl:       BP Readings from Last 3 Encounters:   03/23/23 138/90   01/20/23 126/72   11/07/22 130/78       Recent Lab results:  Lab Results   Component Value Date    CHOL 190 04/21/2023   ,   Lab Results   Component Value Date    HDL 39 (L) 04/21/2023   ,   Lab Results   Component Value Date    LDLCALC 107 (H) 04/21/2023   ,   Lab Results   Component Value Date    TRIG 256 (H) 04/21/2023        Lab Results   Component Value Date    GLUCOSE 143 (H) 04/21/2023   ,   Lab Results   Component Value Date    HGBA1C 7.0 (H) 04/21/2023         Lab Results   Component Value Date    CREATININE 0.57 04/21/2023       Lab Results   Component Value Date    TSH 5.150 (H) 04/21/2023           Lab Results   Component Value Date    HGBA1C 7.0 (H) 04/21/2023

## 2023-07-24 NOTE — TELEPHONE ENCOUNTER
Medication Request   Patient PCP: Tiny Camp MD  Next Office Visit: 8/1/2023  Has this provider prescribed this medication before?: Yes   Medication Name:  Alprazolam   Medication Dose: 0.25mg   Medication Frequency: 30 mg   Preferred Pharmacy:   [facility]/pharmacy #[id number] [place] [id number]  Phone: [phone] Fax: [phone]      Please allow 2 business days for your provider to send your medication request or to reach out to discuss.

## 2023-08-14 ENCOUNTER — OFFICE VISIT (OUTPATIENT)
Dept: FAMILY MEDICINE | Facility: CLINIC | Age: 58
End: 2023-08-14
Payer: COMMERCIAL

## 2023-08-14 VITALS
RESPIRATION RATE: 16 BRPM | BODY MASS INDEX: 29.09 KG/M2 | WEIGHT: 181 LBS | DIASTOLIC BLOOD PRESSURE: 70 MMHG | TEMPERATURE: 98.5 F | OXYGEN SATURATION: 95 % | HEART RATE: 76 BPM | SYSTOLIC BLOOD PRESSURE: 140 MMHG | HEIGHT: 66 IN

## 2023-08-14 DIAGNOSIS — Z83.49 FAMILY HISTORY OF HEMOCHROMATOSIS: ICD-10-CM

## 2023-08-14 DIAGNOSIS — Z12.31 ENCOUNTER FOR SCREENING MAMMOGRAM FOR MALIGNANT NEOPLASM OF BREAST: ICD-10-CM

## 2023-08-14 DIAGNOSIS — J30.9 ALLERGIC RHINITIS, UNSPECIFIED SEASONALITY, UNSPECIFIED TRIGGER: ICD-10-CM

## 2023-08-14 DIAGNOSIS — F41.9 ANXIETY: ICD-10-CM

## 2023-08-14 DIAGNOSIS — I10 HYPERTENSION, ESSENTIAL: ICD-10-CM

## 2023-08-14 DIAGNOSIS — E55.9 VITAMIN D DEFICIENCY: ICD-10-CM

## 2023-08-14 DIAGNOSIS — Z78.9 EX-SMOKER FOR LESS THAN 1 YEAR: ICD-10-CM

## 2023-08-14 DIAGNOSIS — E11.9 TYPE 2 DIABETES MELLITUS WITHOUT COMPLICATION, WITHOUT LONG-TERM CURRENT USE OF INSULIN (CMS/HCC): Primary | ICD-10-CM

## 2023-08-14 DIAGNOSIS — Z11.59 SCREENING FOR VIRAL DISEASE: ICD-10-CM

## 2023-08-14 DIAGNOSIS — E11.69 TYPE 2 DIABETES MELLITUS WITH HYPERLIPIDEMIA (CMS/HCC)  (CMS/HCC): ICD-10-CM

## 2023-08-14 DIAGNOSIS — R94.6 ABNORMAL THYROID FUNCTION TEST: ICD-10-CM

## 2023-08-14 DIAGNOSIS — E78.5 TYPE 2 DIABETES MELLITUS WITH HYPERLIPIDEMIA (CMS/HCC)  (CMS/HCC): ICD-10-CM

## 2023-08-14 PROBLEM — J30.89 NON-SEASONAL ALLERGIC RHINITIS: Status: ACTIVE | Noted: 2023-08-14

## 2023-08-14 PROCEDURE — 3078F DIAST BP <80 MM HG: CPT | Performed by: FAMILY MEDICINE

## 2023-08-14 PROCEDURE — 99214 OFFICE O/P EST MOD 30 MIN: CPT | Performed by: FAMILY MEDICINE

## 2023-08-14 PROCEDURE — 3077F SYST BP >= 140 MM HG: CPT | Performed by: FAMILY MEDICINE

## 2023-08-14 PROCEDURE — 3008F BODY MASS INDEX DOCD: CPT | Performed by: FAMILY MEDICINE

## 2023-08-14 ASSESSMENT — PATIENT HEALTH QUESTIONNAIRE - PHQ9: SUM OF ALL RESPONSES TO PHQ9 QUESTIONS 1 & 2: 0

## 2023-08-14 ASSESSMENT — ENCOUNTER SYMPTOMS
EYES NEGATIVE: 1
ARTHRALGIAS: 1
NERVOUS/ANXIOUS: 1
GASTROINTESTINAL NEGATIVE: 1
RESPIRATORY NEGATIVE: 1
BACK PAIN: 1
CARDIOVASCULAR NEGATIVE: 1
NEUROLOGICAL NEGATIVE: 1
CONSTITUTIONAL NEGATIVE: 1
NECK PAIN: 1

## 2023-08-14 NOTE — PROGRESS NOTES
"      Subjective      Patient ID: Hilda Cornelius is a 58 y.o. female.  1965      This 58-year-old  female presents 9-month since I last saw her for follow-up without any acute complaints.  She is weaned herself down to alprazolam 0.25 mg twice daily and since doing that has been sleeping poorly and also having increased tenderness.  Acutely she is complaining about worsening low back pain and right-sided sciatica since missing the seat she was going to sit on a week ago.  She continues to follow with endocrinology at [facility].  She continues to be followed by pain management.    Her medications were reviewed and reconciled.  This week she is 1 year free of nicotine which I congratulated her on.      The following have been reviewed and updated as appropriate in this visit:   Tobacco  Allergies  Meds  Problems  Med Hx  Surg Hx  Fam Hx       Review of Systems   Constitutional: Negative.    HENT:        She has chronically decreased hearing from infections as a child and had multiple sets of tubes in childhood.  She does have pressure in her ears occasionally.  As mentioned in HPI she has tinnitus and decreased hearing.   Eyes: Negative.    Respiratory: Negative.    Cardiovascular: Negative.    Gastrointestinal: Negative.    Musculoskeletal: Positive for arthralgias, back pain and neck pain.   Skin: Negative.    Allergic/Immunologic: Positive for environmental allergies.   Neurological: Negative.    Psychiatric/Behavioral: The patient is nervous/anxious.        Objective     Vitals:    08/14/23 1805   BP: 140/70   BP Location: Left upper arm   Patient Position: Sitting   Pulse: 76   Resp: 16   Temp: 36.9 °C (98.5 °F)   TempSrc: Oral   SpO2: 95%   Weight: 82.1 kg (181 lb)   Height: 1.676 m (5' 6\")     Body mass index is 29.21 kg/m².    Physical Exam  Vitals reviewed.   Constitutional:       General: She is not in acute distress.     Appearance: She is normal weight. She is not ill-appearing.    "   Comments: Her weight is stable   HENT:      Head: Normocephalic and atraumatic.      Right Ear: Tympanic membrane, ear canal and external ear normal.      Left Ear: Tympanic membrane, ear canal and external ear normal.      Ears:      Comments: Scarring visible on both TMs without any evidence of acute process.     Nose: No congestion or rhinorrhea.   Cardiovascular:      Rate and Rhythm: Normal rate and regular rhythm.      Pulses: Normal pulses.           Dorsalis pedis pulses are 2+ on the right side and 2+ on the left side.        Posterior tibial pulses are 2+ on the right side and 2+ on the left side.      Heart sounds: Normal heart sounds.   Pulmonary:      Effort: Pulmonary effort is normal.      Breath sounds: Normal breath sounds.   Musculoskeletal:      Right lower leg: No edema.      Left lower leg: No edema.   Feet:      Right foot:      Protective Sensation: 4 sites tested. 4 sites sensed.      Skin integrity: Skin integrity normal.      Toenail Condition: Right toenails are normal.      Left foot:      Protective Sensation: 4 sites tested. 4 sites sensed.      Skin integrity: Skin integrity normal.      Toenail Condition: Left toenails are normal.   Lymphadenopathy:      Cervical: No cervical adenopathy.   Skin:     General: Skin is warm and dry.   Neurological:      General: No focal deficit present.      Mental Status: She is alert.   Psychiatric:         Mood and Affect: Mood normal.       Lab Results   Component Value Date    WBC 8.2 04/21/2023    HGB 13.1 04/21/2023    HCT 39.3 04/21/2023     04/21/2023    CHOL 190 04/21/2023    TRIG 256 (H) 04/21/2023    HDL 39 (L) 04/21/2023    ALT 19 04/21/2023    AST 20 04/21/2023     04/21/2023    K 4.4 04/21/2023     04/21/2023    CREATININE 0.57 04/21/2023    BUN 15 04/21/2023    CO2 22 04/21/2023    TSH 5.150 (H) 04/21/2023    HGBA1C 7.0 (H) 04/21/2023    MICROALBUR 13.4 04/21/2023     Assessment/Plan   Diagnoses and all orders for  this visit:    Type 2 diabetes mellitus without complication, without long-term current use of insulin (CMS/HCC) (Primary)  Assessment & Plan:  Most recent hemoglobin A1c 7.0 April 21, 2023 on Ozempic 0.5 mg daily which she is having difficult time affording.  She is no longer taking metformin because it made her feel poorly.  Diabetic eye exam referral was placed, she has not had 1 in several years.  Diabetic foot exam was normal.  Foot care was discussed.    Orders:  -     Ambulatory referral to Optometry; Future  -     CBC and differential; Future  -     Comprehensive metabolic panel; Future  -     Hemoglobin A1c; Future  -     Lipid panel; Future  -     Microalbumin / creatinine urine ratio; Future    Family history of hemochromatosis  -     Iron and TIBC; Future  -     Ferritin; Future  -     Hereditary Hemochromatosis DNA Mutation Analysis; Future    Encounter for screening mammogram for malignant neoplasm of breast  -     BI SCREENING MAMMOGRAM BILATERAL(TOMOSYNTHESIS); Future    Hypertension, essential  Assessment & Plan:  Blood pressures controlled on metoprolol succinate 100 mg daily and olmesartan 40 mg daily with hydrochlorothiazide 25 mg daily.  Renal function was normal in April currently and repeat blood work is ordered.      Abnormal thyroid function test  Assessment & Plan:  Clinically euthyroid with 2 abnormal TSHs over the last 4 years.  Repeat TFTs ordered.    Orders:  -     TSH w reflex FT4; Future    Vitamin D deficiency  Assessment & Plan:  She admits that she took vitamin D supplements only briefly and I have asked her to restart them.  Repeat blood work will be done in October.    Orders:  -     Vitamin D 25 hydroxy; Future    Screening for viral disease  -     HIV 1,2 AB P24 AG; Future  -     Hepatitis C antibody; Future  -     Hepatitis B Surface Antibody Immunity, Quantitative; Future    Type 2 diabetes mellitus with hyperlipidemia (CMS/HCC)  Assessment & Plan:  Most recent hemoglobin A1c  7.0 April 21, 2023 on Ozempic 0.5 mg daily which she is having difficult time affording.  She is no longer taking metformin because it made her feel poorly.  Diabetic eye exam referral was placed, she has not had 1 in several years.  Diabetic foot exam was normal.  Foot care was discussed.      Anxiety  Assessment & Plan:  She has done beautifully slowly weaning her alprazolam to 0.25 mg twice daily and for now we will pause at this dose along with Lexapro 20 mg daily and continue weaning as tolerated.  She has had worsening insomnia since weaning alprazolam and I suggested she try magnesium or melatonin.      Ex-smoker for less than 1 year  Assessment & Plan:  She quit smoking a year ago this week which I congratulated her on.  She is not having any cravings.  We will follow her conservatively.      Allergic rhinitis, unspecified seasonality, unspecified trigger  Assessment & Plan:  She has chronic allergic rhinitis and eustachian tube dysfunction but does not like to take antihistamines or steroid nasal sprays so she uses Benadryl as needed which she will continue.

## 2023-08-14 NOTE — ASSESSMENT & PLAN NOTE
She admits that she took vitamin D supplements only briefly and I have asked her to restart them.  Repeat blood work will be done in October.

## 2023-08-14 NOTE — ASSESSMENT & PLAN NOTE
Blood pressures controlled on metoprolol succinate 100 mg daily and olmesartan 40 mg daily with hydrochlorothiazide 25 mg daily.  Renal function was normal in April currently and repeat blood work is ordered.

## 2023-08-14 NOTE — ASSESSMENT & PLAN NOTE
She has chronic allergic rhinitis and eustachian tube dysfunction but does not like to take antihistamines or steroid nasal sprays so she uses Benadryl as needed which she will continue.

## 2023-08-14 NOTE — ASSESSMENT & PLAN NOTE
She has done beautifully slowly weaning her alprazolam to 0.25 mg twice daily and for now we will pause at this dose along with Lexapro 20 mg daily and continue weaning as tolerated.  She has had worsening insomnia since weaning alprazolam and I suggested she try magnesium or melatonin.

## 2023-08-14 NOTE — ASSESSMENT & PLAN NOTE
Most recent hemoglobin A1c 7.0 April 21, 2023 on Ozempic 0.5 mg daily which she is having difficult time affording.  She is no longer taking metformin because it made her feel poorly.  Diabetic eye exam referral was placed, she has not had 1 in several years.  Diabetic foot exam was normal.  Foot care was discussed.

## 2023-08-14 NOTE — ASSESSMENT & PLAN NOTE
She quit smoking a year ago this week which I congratulated her on.  She is not having any cravings.  We will follow her conservatively.

## 2023-08-22 DIAGNOSIS — F41.9 ANXIETY: ICD-10-CM

## 2023-08-22 RX ORDER — ALPRAZOLAM 0.25 MG/1
0.25 TABLET ORAL 2 TIMES DAILY PRN
Qty: 60 TABLET | Refills: 0 | Status: SHIPPED | OUTPATIENT
Start: 2023-08-22 | End: 2023-09-20 | Stop reason: SDUPTHER

## 2023-08-22 NOTE — TELEPHONE ENCOUNTER
Medicine Refill Request    Last Office Visit: 8/14/2023   Last Consult Visit: Visit date not found  Last Telemedicine Visit: 1/25/2021 Emily Sullivan MD    Next Appointment: 11/6/2023      Current Outpatient Medications:   •  ALPRAZolam (XANAX) 0.25 mg tablet, Take 1 tablet (0.25 mg total) by mouth 2 (two) times a day as needed for anxiety. Weaning:  New dose is 0.25 mg bid, Disp: 60 tablet, Rfl: 0  •  blood-glucose meter (PRECISION XTRA MONITOR) misc, 1 strip 2 (two) times a day., Disp: 1 each, Rfl: 0  •  escitalopram (LEXAPRO) 20 mg tablet, Take 1 tablet (20 mg total) by mouth daily., Disp: 90 tablet, Rfl: 1  •  fentaNYL (DURAGESIC) 25 mcg/hr, Place 1 patch on the skin See admin instr. Apply 1 patch topically every 72 hours. Use with 12mcg, for 37 mcg total., Disp: 10 patch, Rfl: 0  •  FREESTYLE THOM 2 SENSOR kit, PLACE 1 DEVICE ON THE SKIN EVERY 2 WEEKS., Disp: , Rfl:   •  hydrochlorothiazide (HYDRODIURIL) 25 mg tablet, Take 1 tablet (25 mg total) by mouth daily., Disp: 90 tablet, Rfl: 1  •  lancets (freestyle) 28 gauge misc, 1 strip 2 (two) times a day., Disp: 100 each, Rfl: 3  •  lansoprazole (PREVACID) 30 mg capsule, Take 1 capsule (30 mg total) by mouth daily before breakfast., Disp: 90 capsule, Rfl: 1  •  metoprolol succinate XL (TOPROL-XL) 100 mg 24 hr tablet, Take 1 tablet (100 mg total) by mouth daily., Disp: 30 tablet, Rfl: 3  •  olmesartan (BENICAR) 40 mg tablet, Take 1 tablet (40 mg total) by mouth daily., Disp: 30 tablet, Rfl: 3  •  oxyCODONE (OXY-IR) 5 mg capsule, TAKE 1-2 CAPSULES BY MOUTH EVERY 8 HOURS AS NEEDED PAIN, Disp: , Rfl:   •  semaglutide (OZEMPIC) 0.25 mg or 0.5 mg(2 mg/1.5 mL) pen injector, Inject 0.25 mg under skin once a week for 4 weeks, then increase to 0.5 mg once a week., Disp: , Rfl:       BP Readings from Last 3 Encounters:   08/14/23 140/70   03/23/23 138/90   01/20/23 126/72       Recent Lab results:  Lab Results   Component Value Date    CHOL 190 04/21/2023   ,   Lab  Results   Component Value Date    HDL 39 (L) 04/21/2023   ,   Lab Results   Component Value Date    LDLCALC 107 (H) 04/21/2023   ,   Lab Results   Component Value Date    TRIG 256 (H) 04/21/2023        Lab Results   Component Value Date    GLUCOSE 143 (H) 04/21/2023   ,   Lab Results   Component Value Date    HGBA1C 7.0 (H) 04/21/2023         Lab Results   Component Value Date    CREATININE 0.57 04/21/2023       Lab Results   Component Value Date    TSH 5.150 (H) 04/21/2023           Lab Results   Component Value Date    HGBA1C 7.0 (H) 04/21/2023

## 2023-08-24 ENCOUNTER — PATIENT OUTREACH (OUTPATIENT)
Dept: CASE MANAGEMENT | Facility: CLINIC | Age: 58
End: 2023-08-24
Payer: COMMERCIAL

## 2023-08-24 NOTE — PROGRESS NOTES
NAME: Hilda Cornelius    YOB: 1965    PCP: Tiny Camp MD       Med Adherence Questions:            Barriers:  [facility] records report patient starting statin. [facility] records have no active statin listed in medication profile.  Care Manager provided an outreach call to patient and left a message on [employer] requesting call back.   Call to [facility] pharmacy and pharmacy representative reports no recent record of statin fills.  Message sent to PCP regarding statin measure.    Masha Ramirez RN BSN  Care Manager   [phone]          Interventions:                Masha Ramirez RN

## 2023-09-20 DIAGNOSIS — F41.9 ANXIETY: ICD-10-CM

## 2023-09-20 RX ORDER — ALPRAZOLAM 0.25 MG/1
0.25 TABLET ORAL 2 TIMES DAILY PRN
Qty: 60 TABLET | Refills: 0 | Status: SHIPPED | OUTPATIENT
Start: 2023-09-20 | End: 2023-10-23 | Stop reason: SDUPTHER

## 2023-09-20 NOTE — TELEPHONE ENCOUNTER
Medicine Refill Request    Last Office Visit: 8/14/2023   Last Consult Visit: Visit date not found  Last Telemedicine Visit: 1/25/2021 Emily Sullivan MD    Next Appointment: 9/26/2023      Current Outpatient Medications:     ALPRAZolam (XANAX) 0.25 mg tablet, Take 1 tablet (0.25 mg total) by mouth 2 (two) times a day as needed for anxiety. Weaning:  New dose is 0.25 mg bid, Disp: 60 tablet, Rfl: 0    blood-glucose meter (PRECISION XTRA MONITOR) misc, 1 strip 2 (two) times a day., Disp: 1 each, Rfl: 0    escitalopram (LEXAPRO) 20 mg tablet, Take 1 tablet (20 mg total) by mouth daily., Disp: 90 tablet, Rfl: 1    fentaNYL (DURAGESIC) 25 mcg/hr, Place 1 patch on the skin See admin instr. Apply 1 patch topically every 72 hours. Use with 12mcg, for 37 mcg total., Disp: 10 patch, Rfl: 0    FREESTYLE THOM 2 SENSOR kit, PLACE 1 DEVICE ON THE SKIN EVERY 2 WEEKS., Disp: , Rfl:     hydrochlorothiazide (HYDRODIURIL) 25 mg tablet, Take 1 tablet (25 mg total) by mouth daily., Disp: 90 tablet, Rfl: 1    lancets (freestyle) 28 gauge misc, 1 strip 2 (two) times a day., Disp: 100 each, Rfl: 3    lansoprazole (PREVACID) 30 mg capsule, Take 1 capsule (30 mg total) by mouth daily before breakfast., Disp: 90 capsule, Rfl: 1    metoprolol succinate XL (TOPROL-XL) 100 mg 24 hr tablet, Take 1 tablet (100 mg total) by mouth daily., Disp: 30 tablet, Rfl: 3    olmesartan (BENICAR) 40 mg tablet, Take 1 tablet (40 mg total) by mouth daily., Disp: 30 tablet, Rfl: 3    oxyCODONE (OXY-IR) 5 mg capsule, TAKE 1-2 CAPSULES BY MOUTH EVERY 8 HOURS AS NEEDED PAIN, Disp: , Rfl:     semaglutide (OZEMPIC) 0.25 mg or 0.5 mg(2 mg/1.5 mL) pen injector, Inject 0.25 mg under skin once a week for 4 weeks, then increase to 0.5 mg once a week., Disp: , Rfl:       BP Readings from Last 3 Encounters:   08/14/23 140/70   03/23/23 138/90   01/20/23 126/72       Recent Lab results:  Lab Results   Component Value Date    CHOL 190 04/21/2023   ,   Lab  Results   Component Value Date    HDL 39 (L) 04/21/2023   ,   Lab Results   Component Value Date    LDLCALC 107 (H) 04/21/2023   ,   Lab Results   Component Value Date    TRIG 256 (H) 04/21/2023        Lab Results   Component Value Date    GLUCOSE 143 (H) 04/21/2023   ,   Lab Results   Component Value Date    HGBA1C 7.0 (H) 04/21/2023         Lab Results   Component Value Date    CREATININE 0.57 04/21/2023       Lab Results   Component Value Date    TSH 5.150 (H) 04/21/2023           Lab Results   Component Value Date    HGBA1C 7.0 (H) 04/21/2023

## 2023-09-20 NOTE — TELEPHONE ENCOUNTER
Medication Request   Patient PCP: Tiny Camp MD  Next Office Visit: 11/6/2023  Has this provider prescribed this medication before?: Yes    ALPRAZolam (XANAX) 0.25 mg tablet          Preferred Pharmacy:   [facility]/pharmacy #[id number] [place] [id number]  Phone: [phone] Fax: [phone]      Pt will be due for medication by 9/23/2023

## 2023-09-30 DIAGNOSIS — K29.00 OTHER ACUTE GASTRITIS WITHOUT HEMORRHAGE: ICD-10-CM

## 2023-10-02 RX ORDER — LANSOPRAZOLE 30 MG/1
30 CAPSULE, DELAYED RELEASE ORAL
Qty: 90 CAPSULE | Refills: 1 | Status: SHIPPED | OUTPATIENT
Start: 2023-10-02 | End: 2024-05-16 | Stop reason: SDUPTHER

## 2023-10-02 NOTE — TELEPHONE ENCOUNTER
Medicine Refill Request    Last Office Visit: 8/14/2023   Last Consult Visit: Visit date not found  Last Telemedicine Visit: 1/25/2021 Emily Sullivan MD    Next Appointment: 11/6/2023      Current Outpatient Medications:     ALPRAZolam (XANAX) 0.25 mg tablet, Take 1 tablet (0.25 mg total) by mouth 2 (two) times a day as needed for anxiety., Disp: 60 tablet, Rfl: 0    blood-glucose meter (PRECISION XTRA MONITOR) misc, 1 strip 2 (two) times a day., Disp: 1 each, Rfl: 0    escitalopram (LEXAPRO) 20 mg tablet, Take 1 tablet (20 mg total) by mouth daily., Disp: 90 tablet, Rfl: 1    fentaNYL (DURAGESIC) 25 mcg/hr, Place 1 patch on the skin See admin instr. Apply 1 patch topically every 72 hours. Use with 12mcg, for 37 mcg total., Disp: 10 patch, Rfl: 0    FREESTYLE THOM 2 SENSOR kit, PLACE 1 DEVICE ON THE SKIN EVERY 2 WEEKS., Disp: , Rfl:     hydrochlorothiazide (HYDRODIURIL) 25 mg tablet, Take 1 tablet (25 mg total) by mouth daily., Disp: 90 tablet, Rfl: 1    lancets (freestyle) 28 gauge misc, 1 strip 2 (two) times a day., Disp: 100 each, Rfl: 3    lansoprazole (PREVACID) 30 mg capsule, Take 1 capsule (30 mg total) by mouth daily before breakfast., Disp: 90 capsule, Rfl: 1    metoprolol succinate XL (TOPROL-XL) 100 mg 24 hr tablet, Take 1 tablet (100 mg total) by mouth daily., Disp: 30 tablet, Rfl: 3    olmesartan (BENICAR) 40 mg tablet, Take 1 tablet (40 mg total) by mouth daily., Disp: 30 tablet, Rfl: 3    oxyCODONE (OXY-IR) 5 mg capsule, TAKE 1-2 CAPSULES BY MOUTH EVERY 8 HOURS AS NEEDED PAIN, Disp: , Rfl:     semaglutide (OZEMPIC) 0.25 mg or 0.5 mg(2 mg/1.5 mL) pen injector, Inject 0.25 mg under skin once a week for 4 weeks, then increase to 0.5 mg once a week., Disp: , Rfl:       BP Readings from Last 3 Encounters:   08/14/23 140/70   03/23/23 138/90   01/20/23 126/72       Recent Lab results:  Lab Results   Component Value Date    CHOL 190 04/21/2023   ,   Lab Results   Component Value Date    HDL  39 (L) 04/21/2023   ,   Lab Results   Component Value Date    LDLCALC 107 (H) 04/21/2023   ,   Lab Results   Component Value Date    TRIG 256 (H) 04/21/2023        Lab Results   Component Value Date    GLUCOSE 143 (H) 04/21/2023   ,   Lab Results   Component Value Date    HGBA1C 7.0 (H) 04/21/2023         Lab Results   Component Value Date    CREATININE 0.57 04/21/2023       Lab Results   Component Value Date    TSH 5.150 (H) 04/21/2023           Lab Results   Component Value Date    HGBA1C 7.0 (H) 04/21/2023

## 2023-10-23 DIAGNOSIS — F41.9 ANXIETY: ICD-10-CM

## 2023-10-23 RX ORDER — ALPRAZOLAM 0.25 MG/1
0.25 TABLET ORAL 2 TIMES DAILY PRN
Qty: 60 TABLET | Refills: 0 | Status: SHIPPED | OUTPATIENT
Start: 2023-10-23 | End: 2023-11-21 | Stop reason: SDUPTHER

## 2023-10-26 DIAGNOSIS — I10 HYPERTENSION, ESSENTIAL: ICD-10-CM

## 2023-10-26 DIAGNOSIS — F41.9 ANXIETY: ICD-10-CM

## 2023-10-26 RX ORDER — ESCITALOPRAM OXALATE 20 MG/1
20 TABLET ORAL DAILY
Qty: 90 TABLET | Refills: 3 | Status: SHIPPED | OUTPATIENT
Start: 2023-10-26 | End: 2024-10-21

## 2023-10-26 RX ORDER — HYDROCHLOROTHIAZIDE 25 MG/1
25 TABLET ORAL DAILY
Qty: 90 TABLET | Refills: 3 | Status: SHIPPED | OUTPATIENT
Start: 2023-10-26 | End: 2024-10-21

## 2023-11-06 ENCOUNTER — OFFICE VISIT (OUTPATIENT)
Dept: FAMILY MEDICINE | Facility: CLINIC | Age: 58
End: 2023-11-06
Payer: COMMERCIAL

## 2023-11-06 VITALS
OXYGEN SATURATION: 99 % | WEIGHT: 184 LBS | HEIGHT: 66 IN | DIASTOLIC BLOOD PRESSURE: 82 MMHG | BODY MASS INDEX: 29.57 KG/M2 | TEMPERATURE: 98.1 F | HEART RATE: 75 BPM | RESPIRATION RATE: 16 BRPM | SYSTOLIC BLOOD PRESSURE: 140 MMHG

## 2023-11-06 DIAGNOSIS — Z23 NEED FOR COVID-19 VACCINE: ICD-10-CM

## 2023-11-06 DIAGNOSIS — Z23 NEED FOR IMMUNIZATION AGAINST INFLUENZA: ICD-10-CM

## 2023-11-06 DIAGNOSIS — E11.9 TYPE 2 DIABETES MELLITUS WITHOUT COMPLICATION, WITH LONG-TERM CURRENT USE OF INSULIN (CMS/HCC): ICD-10-CM

## 2023-11-06 DIAGNOSIS — G89.4 CHRONIC PAIN SYNDROME: ICD-10-CM

## 2023-11-06 DIAGNOSIS — Z00.00 WELCOME TO MEDICARE PREVENTIVE VISIT: Primary | ICD-10-CM

## 2023-11-06 DIAGNOSIS — F41.9 ANXIETY: ICD-10-CM

## 2023-11-06 DIAGNOSIS — Z79.4 TYPE 2 DIABETES MELLITUS WITHOUT COMPLICATION, WITH LONG-TERM CURRENT USE OF INSULIN (CMS/HCC): ICD-10-CM

## 2023-11-06 PROBLEM — Z87.891 EX-SMOKER FOR MORE THAN 1 YEAR: Status: ACTIVE | Noted: 2023-03-23

## 2023-11-06 PROCEDURE — 3008F BODY MASS INDEX DOCD: CPT | Performed by: FAMILY MEDICINE

## 2023-11-06 PROCEDURE — 90686 IIV4 VACC NO PRSV 0.5 ML IM: CPT | Performed by: FAMILY MEDICINE

## 2023-11-06 PROCEDURE — 91322 SARSCOV2 VAC 50 MCG/0.5ML IM: CPT | Performed by: FAMILY MEDICINE

## 2023-11-06 PROCEDURE — G0008 ADMIN INFLUENZA VIRUS VAC: HCPCS | Performed by: FAMILY MEDICINE

## 2023-11-06 PROCEDURE — 90480 ADMN SARSCOV2 VAC 1/ONLY CMP: CPT | Performed by: FAMILY MEDICINE

## 2023-11-06 PROCEDURE — 99214 OFFICE O/P EST MOD 30 MIN: CPT | Mod: 25 | Performed by: FAMILY MEDICINE

## 2023-11-06 PROCEDURE — 3077F SYST BP >= 140 MM HG: CPT | Performed by: FAMILY MEDICINE

## 2023-11-06 PROCEDURE — G0402 INITIAL PREVENTIVE EXAM: HCPCS | Performed by: FAMILY MEDICINE

## 2023-11-06 PROCEDURE — 3079F DIAST BP 80-89 MM HG: CPT | Performed by: FAMILY MEDICINE

## 2023-11-06 RX ORDER — GABAPENTIN 300 MG/1
300 CAPSULE ORAL AS NEEDED
COMMUNITY
Start: 2023-10-27 | End: 2025-03-06

## 2023-11-06 ASSESSMENT — MINI COG
TOTAL SCORE: 5
COMPLETED: YES

## 2023-11-06 ASSESSMENT — ENCOUNTER SYMPTOMS
CONSTITUTIONAL NEGATIVE: 1
RESPIRATORY NEGATIVE: 1
CARDIOVASCULAR NEGATIVE: 1
NEUROLOGICAL NEGATIVE: 1
PSYCHIATRIC NEGATIVE: 1

## 2023-11-06 ASSESSMENT — PATIENT HEALTH QUESTIONNAIRE - PHQ9: SUM OF ALL RESPONSES TO PHQ9 QUESTIONS 1 & 2: 0

## 2023-11-06 NOTE — ASSESSMENT & PLAN NOTE
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  I will follow along conservatively.  She is on permanent disability due to this.

## 2023-11-06 NOTE — ASSESSMENT & PLAN NOTE
I have encouraged her to get her blood work done.  She is past due for diabetic eye exam and declines a referral stating that she will schedule it with her ophthalmologist whose name she cannot tell me.

## 2023-11-06 NOTE — ASSESSMENT & PLAN NOTE
Flu and COVID vaccines were administered today.  She lives a healthy lifestyle.  We will discuss Shingrix at the next visit.  Encouraged to get her eye exam done.

## 2023-11-06 NOTE — PROGRESS NOTES
"      Subjective      Patient ID: Hilda Cornelius is a 58 y.o. female.  1965      This 58-year-old  female presents for welcome to Medicare visit without any acute complaints.  She continues to live with a lot of pain on a daily basis related to extensive degenerative disease of all 3 segments of her spine and is managed by Dr. Viet Murray, pain management on chronic opioids.  She offers no other acute complaints.    Her past medical, surgical, family and social histories were reviewed.      The following have been reviewed and updated as appropriate in this visit:   Tobacco  Allergies  Meds  Problems  Med Hx  Surg Hx  Fam Hx       Review of Systems   Constitutional: Negative.    Respiratory: Negative.    Cardiovascular: Negative.    Neurological: Negative.    Psychiatric/Behavioral: Negative.        Objective     Vitals:    11/06/23 1733   BP: 140/82   BP Location: Left upper arm   Patient Position: Sitting   Pulse: 75   Resp: 16   Temp: 36.7 °C (98.1 °F)   TempSrc: Oral   SpO2: 99%   Weight: 83.5 kg (184 lb)   Height: 1.676 m (5' 6\")     Body mass index is 29.7 kg/m².    Physical Exam  Vitals reviewed.   Constitutional:       General: She is not in acute distress.     Appearance: She is normal weight. She is not ill-appearing.      Comments: She weighs 3 pounds more than she did 3 months ago.  She was not further examined.   Neurological:      Mental Status: She is alert.   Psychiatric:         Mood and Affect: Mood normal.         Assessment/Plan   Diagnoses and all orders for this visit:    Welcome to Medicare preventive visit (Primary)  Assessment & Plan:  Flu and COVID vaccines were administered today.  She lives a healthy lifestyle.  We will discuss Shingrix at the next visit.  Encouraged to get her eye exam done.      Need for immunization against influenza  -     Influenza vaccine quadrivalent preservative free 6 mon and older IM (FluLaval)    Need for COVID-19 vaccine  -     " Covid-19 Moderna Monovalent Fall 2023    Anxiety  Assessment & Plan:  Controlled on low-dose alprazolam 0.25 mg twice daily and Lexapro.      Type 2 diabetes mellitus without complication, with long-term current use of insulin (CMS/[facility])  Assessment & Plan:  I have encouraged her to get her blood work done.  She is past due for diabetic eye exam and declines a referral stating that she will schedule it with her ophthalmologist whose name she cannot tell me.      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  I will follow along conservatively.  She is on permanent disability due to this.

## 2023-11-19 DIAGNOSIS — I10 HYPERTENSION, ESSENTIAL: ICD-10-CM

## 2023-11-19 DIAGNOSIS — K29.00 OTHER ACUTE GASTRITIS WITHOUT HEMORRHAGE: ICD-10-CM

## 2023-11-21 DIAGNOSIS — F41.9 ANXIETY: ICD-10-CM

## 2023-11-21 RX ORDER — OLMESARTAN MEDOXOMIL 40 MG/1
40 TABLET ORAL DAILY
Qty: 30 TABLET | Refills: 90 | Status: SHIPPED | OUTPATIENT
Start: 2023-11-21 | End: 2024-03-14 | Stop reason: SDUPTHER

## 2023-11-21 RX ORDER — METOPROLOL SUCCINATE 100 MG/1
100 TABLET, EXTENDED RELEASE ORAL DAILY
Qty: 30 TABLET | Refills: 90 | Status: SHIPPED | OUTPATIENT
Start: 2023-11-21 | End: 2024-07-18 | Stop reason: SDUPTHER

## 2023-11-21 RX ORDER — ALPRAZOLAM 0.25 MG/1
0.25 TABLET ORAL 2 TIMES DAILY PRN
Qty: 60 TABLET | Refills: 2 | Status: SHIPPED | OUTPATIENT
Start: 2023-11-21 | End: 2024-02-21 | Stop reason: SDUPTHER

## 2023-11-21 NOTE — TELEPHONE ENCOUNTER
Patient left message requesting rx refill.    Medicine Refill Request    Last Office Visit: 11/6/2023   Last Consult Visit: Visit date not found  Last Telemedicine Visit: 1/25/2021 Emily Sullivan MD    Next Appointment: 2/5/2024      Current Outpatient Medications:     ALPRAZolam (XANAX) 0.25 mg tablet, Take 1 tablet (0.25 mg total) by mouth 2 (two) times a day as needed for anxiety., Disp: 60 tablet, Rfl: 0    blood-glucose meter (PRECISION XTRA MONITOR) misc, 1 strip 2 (two) times a day., Disp: 1 each, Rfl: 0    escitalopram (LEXAPRO) 20 mg tablet, Take 1 tablet (20 mg total) by mouth daily., Disp: 90 tablet, Rfl: 3    fentaNYL (DURAGESIC) 25 mcg/hr, Place 1 patch on the skin See admin instr. Apply 1 patch topically every 72 hours. Use with 12mcg, for 37 mcg total., Disp: 10 patch, Rfl: 0    FREESTYLE THOM 2 SENSOR kit, PLACE 1 DEVICE ON THE SKIN EVERY 2 WEEKS., Disp: , Rfl:     gabapentin (NEURONTIN) 300 mg capsule, Take 300 mg by mouth 3 (three) times a day., Disp: , Rfl:     hydrochlorothiazide (HYDRODIURIL) 25 mg tablet, Take 1 tablet (25 mg total) by mouth daily., Disp: 90 tablet, Rfl: 3    lancets (freestyle) 28 gauge misc, 1 strip 2 (two) times a day., Disp: 100 each, Rfl: 3    lansoprazole (PREVACID) 30 mg capsule, Take 1 capsule (30 mg total) by mouth daily before breakfast., Disp: 90 capsule, Rfl: 1    metoprolol succinate XL (TOPROL-XL) 100 mg 24 hr tablet, Take 1 tablet (100 mg total) by mouth daily., Disp: 30 tablet, Rfl: 90    olmesartan (BENICAR) 40 mg tablet, Take 1 tablet (40 mg total) by mouth daily., Disp: 30 tablet, Rfl: 90    oxyCODONE (OXY-IR) 5 mg capsule, TAKE 1-2 CAPSULES BY MOUTH EVERY 8 HOURS AS NEEDED PAIN, Disp: , Rfl:     semaglutide (OZEMPIC) 0.25 mg or 0.5 mg(2 mg/1.5 mL) pen injector, Inject 0.25 mg under skin once a week for 4 weeks, then increase to 0.5 mg once a week., Disp: , Rfl:       BP Readings from Last 3 Encounters:   11/06/23 140/82   08/14/23 140/70    03/23/23 138/90       Recent Lab results:  Lab Results   Component Value Date    CHOL 190 04/21/2023   ,   Lab Results   Component Value Date    HDL 39 (L) 04/21/2023   ,   Lab Results   Component Value Date    LDLCALC 107 (H) 04/21/2023   ,   Lab Results   Component Value Date    TRIG 256 (H) 04/21/2023        Lab Results   Component Value Date    GLUCOSE 143 (H) 04/21/2023   ,   Lab Results   Component Value Date    HGBA1C 7.0 (H) 04/21/2023         Lab Results   Component Value Date    CREATININE 0.57 04/21/2023       Lab Results   Component Value Date    TSH 5.150 (H) 04/21/2023           Lab Results   Component Value Date    HGBA1C 7.0 (H) 04/21/2023

## 2023-11-21 NOTE — TELEPHONE ENCOUNTER
Medicine Refill Request    Last Office Visit: 11/6/2023   Last Consult Visit: Visit date not found  Last Telemedicine Visit: 1/25/2021 Emily Sullivan MD    Next Appointment: 2/5/2024      Current Outpatient Medications:     ALPRAZolam (XANAX) 0.25 mg tablet, Take 1 tablet (0.25 mg total) by mouth 2 (two) times a day as needed for anxiety., Disp: 60 tablet, Rfl: 0    blood-glucose meter (PRECISION XTRA MONITOR) misc, 1 strip 2 (two) times a day., Disp: 1 each, Rfl: 0    escitalopram (LEXAPRO) 20 mg tablet, Take 1 tablet (20 mg total) by mouth daily., Disp: 90 tablet, Rfl: 3    fentaNYL (DURAGESIC) 25 mcg/hr, Place 1 patch on the skin See admin instr. Apply 1 patch topically every 72 hours. Use with 12mcg, for 37 mcg total., Disp: 10 patch, Rfl: 0    FREESTYLE THOM 2 SENSOR kit, PLACE 1 DEVICE ON THE SKIN EVERY 2 WEEKS., Disp: , Rfl:     gabapentin (NEURONTIN) 300 mg capsule, Take 300 mg by mouth 3 (three) times a day., Disp: , Rfl:     hydrochlorothiazide (HYDRODIURIL) 25 mg tablet, Take 1 tablet (25 mg total) by mouth daily., Disp: 90 tablet, Rfl: 3    lancets (freestyle) 28 gauge misc, 1 strip 2 (two) times a day., Disp: 100 each, Rfl: 3    lansoprazole (PREVACID) 30 mg capsule, Take 1 capsule (30 mg total) by mouth daily before breakfast., Disp: 90 capsule, Rfl: 1    metoprolol succinate XL (TOPROL-XL) 100 mg 24 hr tablet, Take 1 tablet (100 mg total) by mouth daily., Disp: 30 tablet, Rfl: 3    olmesartan (BENICAR) 40 mg tablet, Take 1 tablet (40 mg total) by mouth daily., Disp: 30 tablet, Rfl: 3    oxyCODONE (OXY-IR) 5 mg capsule, TAKE 1-2 CAPSULES BY MOUTH EVERY 8 HOURS AS NEEDED PAIN, Disp: , Rfl:     semaglutide (OZEMPIC) 0.25 mg or 0.5 mg(2 mg/1.5 mL) pen injector, Inject 0.25 mg under skin once a week for 4 weeks, then increase to 0.5 mg once a week., Disp: , Rfl:       BP Readings from Last 3 Encounters:   11/06/23 140/82   08/14/23 140/70   03/23/23 138/90       Recent Lab results:  Lab  Results   Component Value Date    CHOL 190 04/21/2023   ,   Lab Results   Component Value Date    HDL 39 (L) 04/21/2023   ,   Lab Results   Component Value Date    LDLCALC 107 (H) 04/21/2023   ,   Lab Results   Component Value Date    TRIG 256 (H) 04/21/2023        Lab Results   Component Value Date    GLUCOSE 143 (H) 04/21/2023   ,   Lab Results   Component Value Date    HGBA1C 7.0 (H) 04/21/2023         Lab Results   Component Value Date    CREATININE 0.57 04/21/2023       Lab Results   Component Value Date    TSH 5.150 (H) 04/21/2023           Lab Results   Component Value Date    HGBA1C 7.0 (H) 04/21/2023

## 2024-01-08 ENCOUNTER — PATIENT OUTREACH (OUTPATIENT)
Dept: CASE MANAGEMENT | Facility: CLINIC | Age: 59
End: 2024-01-08
Payer: COMMERCIAL

## 2024-01-08 NOTE — PROGRESS NOTES
NAME: Hilda Cornelius    YOB: 1965    PCP: Tiny Camp MD    Med Adherence Questions:    Barriers:    Interventions:  Message sent to PCP regarding Statin Measure.    Masha Ramirez RN BSN  Care Manager   [phone]

## 2024-02-05 ENCOUNTER — OFFICE VISIT (OUTPATIENT)
Dept: FAMILY MEDICINE | Facility: CLINIC | Age: 59
End: 2024-02-05
Payer: COMMERCIAL

## 2024-02-05 VITALS
HEART RATE: 71 BPM | SYSTOLIC BLOOD PRESSURE: 130 MMHG | RESPIRATION RATE: 16 BRPM | HEIGHT: 65 IN | DIASTOLIC BLOOD PRESSURE: 72 MMHG | TEMPERATURE: 98.6 F | OXYGEN SATURATION: 97 % | BODY MASS INDEX: 29.99 KG/M2 | WEIGHT: 180 LBS

## 2024-02-05 DIAGNOSIS — F33.1 RECURRENT MAJOR DEPRESSIVE EPISODES, MODERATE (CMS/HCC): ICD-10-CM

## 2024-02-05 DIAGNOSIS — G89.4 CHRONIC PAIN SYNDROME: ICD-10-CM

## 2024-02-05 DIAGNOSIS — E78.5 TYPE 2 DIABETES MELLITUS WITH HYPERLIPIDEMIA (CMS/HCC)  (CMS/HCC): ICD-10-CM

## 2024-02-05 DIAGNOSIS — I10 ESSENTIAL HYPERTENSION: ICD-10-CM

## 2024-02-05 DIAGNOSIS — E11.69 TYPE 2 DIABETES MELLITUS WITH HYPERLIPIDEMIA (CMS/HCC)  (CMS/HCC): ICD-10-CM

## 2024-02-05 DIAGNOSIS — F13.20 BENZODIAZEPINE DEPENDENCE (CMS/HCC): Primary | ICD-10-CM

## 2024-02-05 DIAGNOSIS — F41.9 ANXIETY: ICD-10-CM

## 2024-02-05 PROCEDURE — 3075F SYST BP GE 130 - 139MM HG: CPT | Performed by: FAMILY MEDICINE

## 2024-02-05 PROCEDURE — 3008F BODY MASS INDEX DOCD: CPT | Performed by: FAMILY MEDICINE

## 2024-02-05 PROCEDURE — 99213 OFFICE O/P EST LOW 20 MIN: CPT | Performed by: FAMILY MEDICINE

## 2024-02-05 PROCEDURE — 3078F DIAST BP <80 MM HG: CPT | Performed by: FAMILY MEDICINE

## 2024-02-05 ASSESSMENT — PATIENT HEALTH QUESTIONNAIRE - PHQ9: SUM OF ALL RESPONSES TO PHQ9 QUESTIONS 1 & 2: 0

## 2024-02-05 NOTE — PROGRESS NOTES
"      Subjective      Patient ID: Hilda Cornelius is a 58 y.o. female.  1965      This 58-year-old  female presents for follow-up.  She offers no acute complaints and had a viral illness a month ago which has resolved, she was seen in urgent care and prescribed an antibiotic and albuterol.  She has not needed the albuterol.  Unfortunately she has not gotten her blood work done.  She would like to transition her diabetic care to me.  Her pain medication remains unchanged and her mood is well-controlled on her current regiment of alprazolam 0.25 mg twice daily with Lexapro 20 mg daily.  She uses gabapentin infrequently.  She has not had her eye exam yet.      The following have been reviewed and updated as appropriate in this visit:   Tobacco  Allergies  Meds  Problems  Med Hx  Surg Hx  Fam Hx       Review of Systems    Objective     Vitals:    02/05/24 1829   BP: 130/72   BP Location: Left upper arm   Patient Position: Sitting   Pulse: 71   Resp: 16   Temp: 37 °C (98.6 °F)   TempSrc: Oral   SpO2: 97%   Weight: 81.6 kg (180 lb)   Height: 1.651 m (5' 5\")     Body mass index is 29.95 kg/m².    Physical Exam  Constitutional:       General: She is not in acute distress.     Appearance: She is not ill-appearing.      Comments: She weighs 4 pounds less than she did 3 months ago.  She was no further examined.   Neurological:      Mental Status: She is alert.   Psychiatric:         Mood and Affect: Mood normal.         Assessment/Plan   Diagnoses and all orders for this visit:    Benzodiazepine dependence (CMS/HCC) (Primary)  Assessment & Plan:  Will work on weaning her in future visits.      Recurrent major depressive episodes, moderate (CMS/HCC)  Assessment & Plan:  Controlled on Lexapro 20 mg daily.      Type 2 diabetes mellitus with hyperlipidemia (CMS/HCC)   Assessment & Plan:  I told her that I would be happy to take over her diabetic care but she needs to get her blood work done and after that is " available we will also need to discuss statin use.  Has not tolerated metformin in the past due to GI upset and did not tolerate Januvia.  Is not willing to consider Jardiance and has tolerated Ozempic 0.5 mg well.      Chronic pain syndrome  Assessment & Plan:  Managed by pain management and was recently told by her insurance company that they will not be covering her fentanyl anymore so she will need to get her medications adjusted.  She is currently on fentanyl 25 mcg and oxycodone 10 mg 3 times daily.      Essential hypertension  Assessment & Plan:  Blood pressure is controlled on her current regimen and she is encouraged to get her blood work done.      Anxiety  Assessment & Plan:  Controlled on Lexapro 20 mg daily and alprazolam 0.25 mg twice daily which she will call for refills on.

## 2024-02-06 NOTE — ASSESSMENT & PLAN NOTE
I told her that I would be happy to take over her diabetic care but she needs to get her blood work done and after that is available we will also need to discuss statin use.  Has not tolerated metformin in the past due to GI upset and did not tolerate Januvia.  Is not willing to consider Jardiance and has tolerated Ozempic 0.5 mg well.

## 2024-02-06 NOTE — ASSESSMENT & PLAN NOTE
Controlled on Lexapro 20 mg daily and alprazolam 0.25 mg twice daily which she will call for refills on.

## 2024-02-06 NOTE — ASSESSMENT & PLAN NOTE
Managed by pain management and was recently told by her insurance company that they will not be covering her fentanyl anymore so she will need to get her medications adjusted.  She is currently on fentanyl 25 mcg and oxycodone 10 mg 3 times daily.

## 2024-02-06 NOTE — ASSESSMENT & PLAN NOTE
Blood pressure is controlled on her current regimen and she is encouraged to get her blood work done.

## 2024-02-15 NOTE — TELEPHONE ENCOUNTER
Pt would like to know if your able to call in an rx for amoxicillian to [facility]  due to the z jes[facility] didn really work for pt from  2 weeks ago. If any questions pt can be reached at  till 5pm. After 5pm pt can be reached at . Pt has no allergies   
sent  
normal...

## 2024-02-21 DIAGNOSIS — F41.9 ANXIETY: ICD-10-CM

## 2024-02-22 RX ORDER — ALPRAZOLAM 0.25 MG/1
0.25 TABLET ORAL 2 TIMES DAILY PRN
Qty: 60 TABLET | Refills: 2 | Status: SHIPPED | OUTPATIENT
Start: 2024-02-22 | End: 2024-05-28

## 2024-03-14 DIAGNOSIS — I10 HYPERTENSION, ESSENTIAL: ICD-10-CM

## 2024-03-14 RX ORDER — OLMESARTAN MEDOXOMIL 40 MG/1
40 TABLET ORAL DAILY
Qty: 30 TABLET | Refills: 11 | Status: SHIPPED | OUTPATIENT
Start: 2024-03-14 | End: 2024-03-14

## 2024-03-14 RX ORDER — OLMESARTAN MEDOXOMIL 40 MG/1
40 TABLET ORAL DAILY
Qty: 90 TABLET | Refills: 3 | Status: SHIPPED | OUTPATIENT
Start: 2024-03-14 | End: 2025-01-22

## 2024-03-14 NOTE — TELEPHONE ENCOUNTER
Medicine Refill Request    Last Office Visit: 2/5/2024   Last Consult Visit: Visit date not found  Last Telemedicine Visit: 1/25/2021 Emily Sullivan MD    Next Appointment: 5/10/2024      Current Outpatient Medications:   •  ALPRAZolam (XANAX) 0.25 mg tablet, Take 1 tablet (0.25 mg total) by mouth 2 (two) times a day as needed for anxiety., Disp: 60 tablet, Rfl: 2  •  blood-glucose meter (PRECISION XTRA MONITOR) misc, 1 strip 2 (two) times a day., Disp: 1 each, Rfl: 0  •  escitalopram (LEXAPRO) 20 mg tablet, Take 1 tablet (20 mg total) by mouth daily., Disp: 90 tablet, Rfl: 3  •  fentaNYL (DURAGESIC) 25 mcg/hr, Place 1 patch on the skin See admin instr. Apply 1 patch topically every 72 hours. Use with 12mcg, for 37 mcg total., Disp: 10 patch, Rfl: 0  •  FREESTYLE THOM 2 SENSOR kit, PLACE 1 DEVICE ON THE SKIN EVERY 2 WEEKS., Disp: , Rfl:   •  gabapentin (NEURONTIN) 300 mg capsule, Take 300 mg by mouth as needed., Disp: , Rfl:   •  hydrochlorothiazide (HYDRODIURIL) 25 mg tablet, Take 1 tablet (25 mg total) by mouth daily., Disp: 90 tablet, Rfl: 3  •  lancets (freestyle) 28 gauge misc, 1 strip 2 (two) times a day., Disp: 100 each, Rfl: 3  •  lansoprazole (PREVACID) 30 mg capsule, Take 1 capsule (30 mg total) by mouth daily before breakfast., Disp: 90 capsule, Rfl: 1  •  metoprolol succinate XL (TOPROL-XL) 100 mg 24 hr tablet, Take 1 tablet (100 mg total) by mouth daily., Disp: 30 tablet, Rfl: 90  •  olmesartan (BENICAR) 40 mg tablet, Take 1 tablet (40 mg total) by mouth daily., Disp: 30 tablet, Rfl: 90  •  oxyCODONE (OXY-IR) 5 mg capsule, TAKE 1-2 CAPSULES BY MOUTH EVERY 8 HOURS AS NEEDED PAIN, Disp: , Rfl:   •  semaglutide (OZEMPIC) 0.25 mg or 0.5 mg(2 mg/1.5 mL) pen injector, Inject 0.25 mg under skin once a week for 4 weeks, then increase to 0.5 mg once a week., Disp: , Rfl:       BP Readings from Last 3 Encounters:   02/05/24 130/72   11/06/23 140/82   08/14/23 140/70       Recent Lab results:  Lab Results    Component Value Date    CHOL 190 04/21/2023   ,   Lab Results   Component Value Date    HDL 39 (L) 04/21/2023   ,   Lab Results   Component Value Date    LDLCALC 107 (H) 04/21/2023   ,   Lab Results   Component Value Date    TRIG 256 (H) 04/21/2023        Lab Results   Component Value Date    GLUCOSE 143 (H) 04/21/2023   ,   Lab Results   Component Value Date    HGBA1C 7.0 (H) 04/21/2023         Lab Results   Component Value Date    CREATININE 0.57 04/21/2023       Lab Results   Component Value Date    TSH 5.150 (H) 04/21/2023           Lab Results   Component Value Date    HGBA1C 7.0 (H) 04/21/2023

## 2024-03-14 NOTE — TELEPHONE ENCOUNTER
Pt. Pharmacy fax 90 day prescription request for the following med     Please advise if med appropriate

## 2024-03-25 NOTE — TELEPHONE ENCOUNTER
Quality 130: Documentation Of Current Medications In The Medical Record: Current Medications Documented Refill alprazolam 0.5 MG # 90  Refill Oxycodone 5-325 # 180  Refill fentanyl 12 mcg  Refill fentanyl 25 mcg    [facility] [address]       Quality 431: Preventive Care And Screening: Unhealthy Alcohol Use - Screening: Patient not identified as an unhealthy alcohol user when screened for unhealthy alcohol use using a systematic screening method Detail Level: Detailed Quality 226: Preventive Care And Screening: Tobacco Use: Screening And Cessation Intervention: Patient screened for tobacco use and is an ex/non-smoker

## 2024-05-16 ENCOUNTER — TELEPHONE (OUTPATIENT)
Dept: FAMILY MEDICINE | Facility: CLINIC | Age: 59
End: 2024-05-16
Payer: COMMERCIAL

## 2024-05-16 DIAGNOSIS — K29.00 OTHER ACUTE GASTRITIS WITHOUT HEMORRHAGE: ICD-10-CM

## 2024-05-16 RX ORDER — LANSOPRAZOLE 30 MG/1
30 CAPSULE, DELAYED RELEASE ORAL
Qty: 90 CAPSULE | Refills: 1 | Status: SHIPPED | OUTPATIENT
Start: 2024-05-16 | End: 2024-08-15

## 2024-05-16 NOTE — TELEPHONE ENCOUNTER
Medication Request   Patient PCP: Tiny Camp MD  Next Office Visit: 5/20/2024  Has this provider prescribed this medication before?: Yes  Medication Name: lansoprazole (PREVACID)   Medication Dose: 30 mg capsule   Medication Frequency:   Preferred Pharmacy:   [facility]/pharmacy #[id number] [address] [place] [address]  Phone: [phone] Fax: [phone]      Please allow 2 business days for your provider to send your medication request or to reach out to discuss.

## 2024-05-16 NOTE — TELEPHONE ENCOUNTER
Medicine Refill Request    Last Office Visit: 2/5/2024   Last Consult Visit: Visit date not found  Last Telemedicine Visit: 1/25/2021 Emily Sullivan MD    Next Appointment: 5/20/2024      Current Outpatient Medications:     ALPRAZolam (XANAX) 0.25 mg tablet, Take 1 tablet (0.25 mg total) by mouth 2 (two) times a day as needed for anxiety., Disp: 60 tablet, Rfl: 2    blood-glucose meter (PRECISION XTRA MONITOR) misc, 1 strip 2 (two) times a day., Disp: 1 each, Rfl: 0    escitalopram (LEXAPRO) 20 mg tablet, Take 1 tablet (20 mg total) by mouth daily., Disp: 90 tablet, Rfl: 3    fentaNYL (DURAGESIC) 25 mcg/hr, Place 1 patch on the skin See admin instr. Apply 1 patch topically every 72 hours. Use with 12mcg, for 37 mcg total., Disp: 10 patch, Rfl: 0    FREESTYLE THOM 2 SENSOR kit, PLACE 1 DEVICE ON THE SKIN EVERY 2 WEEKS., Disp: , Rfl:     gabapentin (NEURONTIN) 300 mg capsule, Take 300 mg by mouth as needed., Disp: , Rfl:     hydrochlorothiazide (HYDRODIURIL) 25 mg tablet, Take 1 tablet (25 mg total) by mouth daily., Disp: 90 tablet, Rfl: 3    lancets (freestyle) 28 gauge misc, 1 strip 2 (two) times a day., Disp: 100 each, Rfl: 3    lansoprazole (PREVACID) 30 mg capsule, Take 1 capsule (30 mg total) by mouth daily before breakfast., Disp: 90 capsule, Rfl: 1    metoprolol succinate XL (TOPROL-XL) 100 mg 24 hr tablet, Take 1 tablet (100 mg total) by mouth daily., Disp: 30 tablet, Rfl: 90    olmesartan (BENICAR) 40 mg tablet, Take 1 tablet (40 mg total) by mouth daily., Disp: 90 tablet, Rfl: 3    oxyCODONE (OXY-IR) 5 mg capsule, TAKE 1-2 CAPSULES BY MOUTH EVERY 8 HOURS AS NEEDED PAIN, Disp: , Rfl:     semaglutide (OZEMPIC) 0.25 mg or 0.5 mg(2 mg/1.5 mL) pen injector, Inject 0.25 mg under skin once a week for 4 weeks, then increase to 0.5 mg once a week., Disp: , Rfl:       BP Readings from Last 3 Encounters:   02/05/24 130/72   11/06/23 140/82   08/14/23 140/70       Recent Lab results:  Lab Results   Component  Value Date    CHOL 190 04/21/2023   ,   Lab Results   Component Value Date    HDL 39 (L) 04/21/2023   ,   Lab Results   Component Value Date    LDLCALC 107 (H) 04/21/2023   ,   Lab Results   Component Value Date    TRIG 256 (H) 04/21/2023        Lab Results   Component Value Date    GLUCOSE 143 (H) 04/21/2023   ,   Lab Results   Component Value Date    HGBA1C 7.0 (H) 04/21/2023         Lab Results   Component Value Date    CREATININE 0.57 04/21/2023       Lab Results   Component Value Date    TSH 5.150 (H) 04/21/2023           Lab Results   Component Value Date    HGBA1C 7.0 (H) 04/21/2023

## 2024-05-19 LAB
25(OH)D3+25(OH)D2 SERPL-MCNC: 18.7 NG/ML (ref 30–100)
ALBUMIN SERPL-MCNC: 4.3 G/DL (ref 3.8–4.9)
ALBUMIN/CREAT UR: 15 MG/G CREAT (ref 0–29)
ALBUMIN/GLOB SERPL: 1.6 {RATIO} (ref 1.2–2.2)
ALP SERPL-CCNC: 57 IU/L (ref 44–121)
ALT SERPL-CCNC: 22 IU/L (ref 0–32)
AST SERPL-CCNC: 23 IU/L (ref 0–40)
BASOPHILS # BLD AUTO: 0.1 X10E3/UL (ref 0–0.2)
BASOPHILS NFR BLD AUTO: 1 %
BILIRUB SERPL-MCNC: 0.4 MG/DL (ref 0–1.2)
BUN SERPL-MCNC: 9 MG/DL (ref 6–24)
BUN/CREAT SERPL: 15 (ref 9–23)
CALCIUM SERPL-MCNC: 9.9 MG/DL (ref 8.7–10.2)
CHLORIDE SERPL-SCNC: 99 MMOL/L (ref 96–106)
CHOLEST SERPL-MCNC: 220 MG/DL (ref 100–199)
CO2 SERPL-SCNC: 23 MMOL/L (ref 20–29)
CREAT SERPL-MCNC: 0.62 MG/DL (ref 0.57–1)
CREAT UR-MCNC: 36.6 MG/DL
EGFRCR SERPLBLD CKD-EPI 2021: 103 ML/MIN/1.73
EOSINOPHIL # BLD AUTO: 0.2 X10E3/UL (ref 0–0.4)
EOSINOPHIL NFR BLD AUTO: 2 %
ERYTHROCYTE [DISTWIDTH] IN BLOOD BY AUTOMATED COUNT: 12.8 % (ref 11.7–15.4)
FERRITIN SERPL-MCNC: 486 NG/ML (ref 15–150)
GLOBULIN SER CALC-MCNC: 2.7 G/DL (ref 1.5–4.5)
GLUCOSE SERPL-MCNC: 119 MG/DL (ref 70–99)
HBA1C MFR BLD: 7.7 % (ref 4.8–5.6)
HBV SURFACE AB SER-ACNC: <3.1 MIU/ML
HCT VFR BLD AUTO: 38.6 % (ref 34–46.6)
HCV IGG SERPL QL IA: NON REACTIVE
HDLC SERPL-MCNC: 38 MG/DL
HGB BLD-MCNC: 13 G/DL (ref 11.1–15.9)
HIV 1+2 AB+HIV1 P24 AG SERPL QL IA: NON REACTIVE
IMM GRANULOCYTES # BLD AUTO: 0.1 X10E3/UL (ref 0–0.1)
IMM GRANULOCYTES NFR BLD AUTO: 1 %
IRON SATN MFR SERPL: 33 % (ref 15–55)
IRON SERPL-MCNC: 94 UG/DL (ref 27–159)
LDLC SERPL CALC-MCNC: 133 MG/DL (ref 0–99)
LYMPHOCYTES # BLD AUTO: 4 X10E3/UL (ref 0.7–3.1)
LYMPHOCYTES NFR BLD AUTO: 37 %
MCH RBC QN AUTO: 29.8 PG (ref 26.6–33)
MCHC RBC AUTO-ENTMCNC: 33.7 G/DL (ref 31.5–35.7)
MCV RBC AUTO: 89 FL (ref 79–97)
MICROALBUMIN UR-MCNC: 5.4 UG/ML
MONOCYTES # BLD AUTO: 1.1 X10E3/UL (ref 0.1–0.9)
MONOCYTES NFR BLD AUTO: 10 %
NEUTROPHILS # BLD AUTO: 5.5 X10E3/UL (ref 1.4–7)
NEUTROPHILS NFR BLD AUTO: 49 %
PLATELET # BLD AUTO: 306 X10E3/UL (ref 150–450)
POTASSIUM SERPL-SCNC: 4.1 MMOL/L (ref 3.5–5.2)
PROT SERPL-MCNC: 7 G/DL (ref 6–8.5)
RBC # BLD AUTO: 4.36 X10E6/UL (ref 3.77–5.28)
SODIUM SERPL-SCNC: 137 MMOL/L (ref 134–144)
T4 FREE SERPL-MCNC: 1.05 NG/DL (ref 0.82–1.77)
TIBC SERPL-MCNC: 282 UG/DL (ref 250–450)
TRIGL SERPL-MCNC: 276 MG/DL (ref 0–149)
TSH SERPL DL<=0.005 MIU/L-ACNC: 7.56 UIU/ML (ref 0.45–4.5)
UIBC SERPL-MCNC: 188 UG/DL (ref 131–425)
VLDLC SERPL CALC-MCNC: 49 MG/DL (ref 5–40)
WBC # BLD AUTO: 10.9 X10E3/UL (ref 3.4–10.8)

## 2024-05-20 ENCOUNTER — OFFICE VISIT (OUTPATIENT)
Dept: FAMILY MEDICINE | Facility: CLINIC | Age: 59
End: 2024-05-20
Payer: COMMERCIAL

## 2024-05-20 VITALS
DIASTOLIC BLOOD PRESSURE: 70 MMHG | HEART RATE: 77 BPM | HEIGHT: 66 IN | RESPIRATION RATE: 16 BRPM | WEIGHT: 181 LBS | BODY MASS INDEX: 29.09 KG/M2 | OXYGEN SATURATION: 96 % | SYSTOLIC BLOOD PRESSURE: 124 MMHG | TEMPERATURE: 98.1 F

## 2024-05-20 DIAGNOSIS — E11.69 TYPE 2 DIABETES MELLITUS WITH HYPERLIPIDEMIA (CMS/HCC)  (CMS/HCC): ICD-10-CM

## 2024-05-20 DIAGNOSIS — E78.5 TYPE 2 DIABETES MELLITUS WITH HYPERLIPIDEMIA (CMS/HCC)  (CMS/HCC): ICD-10-CM

## 2024-05-20 DIAGNOSIS — E03.9 HYPOTHYROIDISM, UNSPECIFIED TYPE: Primary | ICD-10-CM

## 2024-05-20 DIAGNOSIS — E55.9 VITAMIN D DEFICIENCY: ICD-10-CM

## 2024-05-20 PROBLEM — R31.9 BLOOD IN URINE: Status: RESOLVED | Noted: 2021-03-03 | Resolved: 2024-05-20

## 2024-05-20 PROBLEM — M25.551 RIGHT HIP PAIN: Status: RESOLVED | Noted: 2021-04-19 | Resolved: 2024-05-20

## 2024-05-20 PROBLEM — M25.559 PAIN IN JOINT INVOLVING PELVIC REGION AND THIGH: Status: RESOLVED | Noted: 2021-04-19 | Resolved: 2024-05-20

## 2024-05-20 PROBLEM — M51.369 DEGENERATION OF LUMBAR INTERVERTEBRAL DISC: Status: RESOLVED | Noted: 2018-06-19 | Resolved: 2024-05-20

## 2024-05-20 PROBLEM — M51.369 DEGENERATION OF LUMBAR INTERVERTEBRAL DISC: Status: RESOLVED | Noted: 2019-06-12 | Resolved: 2024-05-20

## 2024-05-20 PROBLEM — R94.6 ABNORMAL THYROID FUNCTION TEST: Status: RESOLVED | Noted: 2023-08-14 | Resolved: 2024-05-20

## 2024-05-20 PROCEDURE — 3078F DIAST BP <80 MM HG: CPT | Performed by: FAMILY MEDICINE

## 2024-05-20 PROCEDURE — 99214 OFFICE O/P EST MOD 30 MIN: CPT | Performed by: FAMILY MEDICINE

## 2024-05-20 PROCEDURE — 3074F SYST BP LT 130 MM HG: CPT | Performed by: FAMILY MEDICINE

## 2024-05-20 PROCEDURE — 3008F BODY MASS INDEX DOCD: CPT | Performed by: FAMILY MEDICINE

## 2024-05-20 RX ORDER — LEVOTHYROXINE SODIUM 50 UG/1
50 TABLET ORAL
Qty: 90 TABLET | Refills: 3 | Status: SHIPPED | OUTPATIENT
Start: 2024-05-20 | End: 2024-12-31 | Stop reason: SINTOL

## 2024-05-20 RX ORDER — ROSUVASTATIN CALCIUM 5 MG/1
5 TABLET, COATED ORAL DAILY
Qty: 90 TABLET | Refills: 1 | Status: SHIPPED | OUTPATIENT
Start: 2024-05-20 | End: 2024-12-31

## 2024-05-20 ASSESSMENT — PATIENT HEALTH QUESTIONNAIRE - PHQ9: SUM OF ALL RESPONSES TO PHQ9 QUESTIONS 1 & 2: 0

## 2024-05-20 NOTE — PROGRESS NOTES
Consent obtained from patient and all parties present in the room? yes    I have obtained the consent of everyone present in the room to make an audio recording of this visit to assist me in documenting the encounter in the EMR.     Subjective     Patient ID: Hilda Cornelius, : 1965 is a 58 y.o. female who presents for Blood Pressure Check, Diabetes, and Med Management    History of Present Illness  The patient presents for evaluation of multiple medical concerns.    This 58-year-old  female presents for her 3-month follow-up.  Acutely she is complaining about a fall on Mother's Day May 12 when she tripped while walking her 130 pound dog and fell onto her left buttock.  Luckily her sciatica has not acted out but she has been having some increased pain.  She is also very frustrated that despite following her diet very strictly she is not losing weight and her blood sugars are not well-controlled.    The patient's current medication regimen remains unchanged. She ceased smoking in 2021. Her current medication regimen includes alprazolam as needed, Lexapro once daily, a fentanyl patch at a low dose of 25 mcg, gabapentin as needed for sciatica, hydrochlorothiazide for blood pressure, lansoprazole for heartburn, metoprolol for heart and blood pressure, and Olmesartan for blood pressure. She has been prescribed Ozempic 0.5 mg, but decreased her dose to 0.25 mg weekly due to adverse effects including exhaustion, constipation, and cost. Her A1c has increased to 7.7. She is currently taking a One A Day supplement with biotin and collagen. She reports persistent heat and thinning hair, and questions if this could be related to her thyroid condition. She reports feeling unwell on metformin. She finds it challenging to adhere to a vegetarian diet due to her aversion to meat products. She attempted to replace meat with nuts a few years ago, but believes she gained weight due to fat consumption She  "reports difficulty keeping track of her medication regimen due to multiple pill organizers and often feels disorganized.    The following have been reviewed and updated as appropriate in this visit:   Tobacco  Allergies  Meds  Problems  Med Hx  Surg Hx  Fam Hx         Objective   Vitals:    05/20/24 1406   BP: 124/70   BP Location: Left upper arm   Patient Position: Sitting   Pulse: 77   Resp: 16   Temp: 36.7 °C (98.1 °F)   TempSrc: Oral   SpO2: 96%   Weight: 82.1 kg (181 lb)   Height: 1.676 m (5' 6\")     Body mass index is 29.21 kg/m².    Physical Exam  Vital Signs noted.  Patient's weight is 181.  Her weight is stable and she was not further examined.  She is in no acute distress.    Results  Laboratory Studies  Vitamin D levels decreased from 10 to 18. Thyroid levels are underactive. A1c is 7.7. Cholesterol levels are high. No protein detected in urine.    Lab Results   Component Value Date    WBC 10.9 (H) 05/18/2024    HGB 13.0 05/18/2024    HCT 38.6 05/18/2024     05/18/2024    CHOL 220 (H) 05/18/2024    TRIG 276 (H) 05/18/2024    HDL 38 (L) 05/18/2024    ALT 22 05/18/2024    AST 23 05/18/2024     05/18/2024    K 4.1 05/18/2024    CL 99 05/18/2024    CREATININE 0.62 05/18/2024    BUN 9 05/18/2024    CO2 23 05/18/2024    TSH 7.560 (H) 05/18/2024    HGBA1C 7.7 (H) 05/18/2024    MICROALBUR 5.4 05/18/2024         Assessment & Plan  1. Vitamin D deficiency.  The patient is advised to persist with her current regimen of vitamin D3 at a dosage of 2000 units, supplemented with an additional 2000 units.    2. Hypothyroidism.  The patient's thyroid levels are observed to be underactive. The patient will be initiated on a low dose of levothyroxine 50 mcg, with a repeat blood work to be conducted in 3 months. Should the levothyroxine prove ineffective, the dosage will be increased.    3.  Type 2 diabetes mellitus with hyperlipidemia.  A1c is increased from 7% to 7.7% and she is very frustrated.  She " cannot afford Ozempic 0.5 mg weekly so she is down to 0.25 mg weekly and for now we will adjust her thyroid and if her A1c remains elevated discussed other options like adding a small dose of metformin.  In the past metformin has caused her to be fatigued but she is willing to try it again.    4.  Hyperlipidemia for which she is now willing to consider a statin and after she has been on her levothyroxine for a couple of weeks and tolerating it well she will start rosuvastatin 5 mg daily.    Follow-up  The patient is scheduled for a follow-up visit in 3 months.

## 2024-05-24 ENCOUNTER — TELEPHONE (OUTPATIENT)
Dept: FAMILY MEDICINE | Facility: CLINIC | Age: 59
End: 2024-05-24
Payer: COMMERCIAL

## 2024-05-24 NOTE — TELEPHONE ENCOUNTER
Request for Medical Advice (NON-URGENT)   Patient PCP: Tiny Camp MD  New or Existing Issue: New   Question or Concern: started new medication, having a problem with it, giving her horrible gas and experiencing diarrhea  Preferred Pharmacy:   [facility]/pharmacy #[id number]  [place] [address]  Phone: [phone] Fax: [phone]    Medication Request   Patient PCP: Tiny Camp MD  Next Office Visit: 8/22/2024  Has this provider prescribed this medication before?: Yes  Medication Name: ALPRAZolam (XANAX)   Medication Dose: 0.25 mg tablet   Medication Frequency:   Preferred Pharmacy:   [facility]/pharmacy #[id number]  [place] [address] [place] [address]  Phone: [phone] Fax: [phone]      Please allow 2 business days for your provider to send your medication request or to reach out to discuss.

## 2024-05-24 NOTE — TELEPHONE ENCOUNTER
Per Dr ESCOBEDO pt told to hold Levothyroxine x 2 days then 1/2 pill x 2 weeks. At that point can re-start full dose.

## 2024-05-26 DIAGNOSIS — F41.9 ANXIETY: ICD-10-CM

## 2024-05-28 RX ORDER — ALPRAZOLAM 0.25 MG/1
0.25 TABLET ORAL 2 TIMES DAILY PRN
Qty: 60 TABLET | Refills: 2 | Status: SHIPPED | OUTPATIENT
Start: 2024-05-28 | End: 2024-08-26

## 2024-05-28 NOTE — TELEPHONE ENCOUNTER
Medicine Refill Request    Last Office Visit: 5/20/2024   Last Consult Visit: Visit date not found  Last Telemedicine Visit: 1/25/2021 Emily Sullivan MD    Next Appointment: 8/22/2024      Current Outpatient Medications:     ALPRAZolam (XANAX) 0.25 mg tablet, Take 1 tablet (0.25 mg total) by mouth 2 (two) times a day as needed for anxiety., Disp: 60 tablet, Rfl: 2    blood-glucose meter (PRECISION XTRA MONITOR) misc, 1 strip 2 (two) times a day., Disp: 1 each, Rfl: 0    escitalopram (LEXAPRO) 20 mg tablet, Take 1 tablet (20 mg total) by mouth daily., Disp: 90 tablet, Rfl: 3    fentaNYL (DURAGESIC) 25 mcg/hr, Place 1 patch on the skin See admin instr. Apply 1 patch topically every 72 hours. Use with 12mcg, for 37 mcg total., Disp: 10 patch, Rfl: 0    FREESTYLE THOM 2 SENSOR kit, PLACE 1 DEVICE ON THE SKIN EVERY 2 WEEKS., Disp: , Rfl:     gabapentin (NEURONTIN) 300 mg capsule, Take 300 mg by mouth as needed., Disp: , Rfl:     hydrochlorothiazide (HYDRODIURIL) 25 mg tablet, Take 1 tablet (25 mg total) by mouth daily., Disp: 90 tablet, Rfl: 3    lancets (freestyle) 28 gauge misc, 1 strip 2 (two) times a day., Disp: 100 each, Rfl: 3    lansoprazole (PREVACID) 30 mg capsule, Take 1 capsule (30 mg total) by mouth daily before breakfast., Disp: 90 capsule, Rfl: 1    levothyroxine (SYNTHROID) 50 mcg tablet, Take 1 tablet (50 mcg total) by mouth daily., Disp: 90 tablet, Rfl: 3    metoprolol succinate XL (TOPROL-XL) 100 mg 24 hr tablet, Take 1 tablet (100 mg total) by mouth daily., Disp: 30 tablet, Rfl: 90    olmesartan (BENICAR) 40 mg tablet, Take 1 tablet (40 mg total) by mouth daily., Disp: 90 tablet, Rfl: 3    oxyCODONE (OXY-IR) 5 mg capsule, TAKE 1-2 CAPSULES BY MOUTH EVERY 8 HOURS AS NEEDED PAIN, Disp: , Rfl:     rosuvastatin (CRESTOR) 5 mg tablet, Take 1 tablet (5 mg total) by mouth daily., Disp: 90 tablet, Rfl: 1    semaglutide (OZEMPIC) 0.25 mg or 0.5 mg(2 mg/1.5 mL) pen injector, Inject 0.25 mg under skin once  a week for 4 weeks, then increase to 0.5 mg once a week., Disp: , Rfl:       BP Readings from Last 3 Encounters:   05/20/24 124/70   02/05/24 130/72   11/06/23 140/82       Recent Lab results:  Lab Results   Component Value Date    CHOL 220 (H) 05/18/2024   ,   Lab Results   Component Value Date    HDL 38 (L) 05/18/2024   ,   Lab Results   Component Value Date    LDLCALC 133 (H) 05/18/2024   ,   Lab Results   Component Value Date    TRIG 276 (H) 05/18/2024        Lab Results   Component Value Date    GLUCOSE 119 (H) 05/18/2024   ,   Lab Results   Component Value Date    HGBA1C 7.7 (H) 05/18/2024         Lab Results   Component Value Date    CREATININE 0.62 05/18/2024       Lab Results   Component Value Date    TSH 7.560 (H) 05/18/2024           Lab Results   Component Value Date    HGBA1C 7.7 (H) 05/18/2024

## 2024-06-12 LAB
HFE GENE MUT ANL BLD/T: NORMAL
IMP & REVIEW OF LAB RESULTS: NORMAL

## 2024-07-18 DIAGNOSIS — I10 HYPERTENSION, ESSENTIAL: ICD-10-CM

## 2024-07-18 RX ORDER — METOPROLOL SUCCINATE 100 MG/1
100 TABLET, EXTENDED RELEASE ORAL DAILY
Qty: 90 TABLET | Refills: 3 | Status: SHIPPED | OUTPATIENT
Start: 2024-07-18

## 2024-07-18 NOTE — TELEPHONE ENCOUNTER
Pharmacy requesting medication refill    metoprolol succinate XL (TOPROL-XL) 100 mg 24 hr tablet

## 2024-07-18 NOTE — TELEPHONE ENCOUNTER
How Severe Is Your Skin Lesion?: moderate Medicine Refill Request    Last Office Visit: 5/20/2024   Last Consult Visit: Visit date not found  Last Telemedicine Visit: 1/25/2021 Emily Sullivan MD    Next Appointment: 8/22/2024      Current Outpatient Medications:     ALPRAZolam (XANAX) 0.25 mg tablet, Take 1 tablet (0.25 mg total) by mouth 2 (two) times a day as needed for anxiety., Disp: 60 tablet, Rfl: 2    blood-glucose meter (PRECISION XTRA MONITOR) misc, 1 strip 2 (two) times a day., Disp: 1 each, Rfl: 0    escitalopram (LEXAPRO) 20 mg tablet, Take 1 tablet (20 mg total) by mouth daily., Disp: 90 tablet, Rfl: 3    fentaNYL (DURAGESIC) 25 mcg/hr, Place 1 patch on the skin See admin instr. Apply 1 patch topically every 72 hours. Use with 12mcg, for 37 mcg total., Disp: 10 patch, Rfl: 0    FREESTYLE THOM 2 SENSOR kit, PLACE 1 DEVICE ON THE SKIN EVERY 2 WEEKS., Disp: , Rfl:     gabapentin (NEURONTIN) 300 mg capsule, Take 300 mg by mouth as needed., Disp: , Rfl:     hydrochlorothiazide (HYDRODIURIL) 25 mg tablet, Take 1 tablet (25 mg total) by mouth daily., Disp: 90 tablet, Rfl: 3    lancets (freestyle) 28 gauge misc, 1 strip 2 (two) times a day., Disp: 100 each, Rfl: 3    lansoprazole (PREVACID) 30 mg capsule, Take 1 capsule (30 mg total) by mouth daily before breakfast., Disp: 90 capsule, Rfl: 1    levothyroxine (SYNTHROID) 50 mcg tablet, Take 1 tablet (50 mcg total) by mouth daily., Disp: 90 tablet, Rfl: 3    metoprolol succinate XL (TOPROL-XL) 100 mg 24 hr tablet, Take 1 tablet (100 mg total) by mouth daily., Disp: 30 tablet, Rfl: 90    olmesartan (BENICAR) 40 mg tablet, Take 1 tablet (40 mg total) by mouth daily., Disp: 90 tablet, Rfl: 3    oxyCODONE (OXY-IR) 5 mg capsule, TAKE 1-2 CAPSULES BY MOUTH EVERY 8 HOURS AS NEEDED PAIN, Disp: , Rfl:     rosuvastatin (CRESTOR) 5 mg tablet, Take 1 tablet (5 mg total) by mouth daily., Disp: 90 tablet, Rfl: 1    semaglutide (OZEMPIC) 0.25 mg or 0.5 mg(2 mg/1.5 mL) pen injector, Inject 0.25 mg under skin once  Have Your Skin Lesions Been Treated?: not been treated a week for 4 weeks, then increase to 0.5 mg once a week., Disp: , Rfl:       BP Readings from Last 3 Encounters:   05/20/24 124/70   02/05/24 130/72   11/06/23 140/82       Recent Lab results:  Lab Results   Component Value Date    CHOL 220 (H) 05/18/2024   ,   Lab Results   Component Value Date    HDL 38 (L) 05/18/2024   ,   Lab Results   Component Value Date    LDLCALC 133 (H) 05/18/2024   ,   Lab Results   Component Value Date    TRIG 276 (H) 05/18/2024        Lab Results   Component Value Date    GLUCOSE 119 (H) 05/18/2024   ,   Lab Results   Component Value Date    HGBA1C 7.7 (H) 05/18/2024         Lab Results   Component Value Date    CREATININE 0.62 05/18/2024       Lab Results   Component Value Date    TSH 7.560 (H) 05/18/2024           Lab Results   Component Value Date    HGBA1C 7.7 (H) 05/18/2024      Is This A New Presentation, Or A Follow-Up?: Skin Lesions

## 2024-08-12 NOTE — TELEPHONE ENCOUNTER
Initial / Assessment/Plan of Care Note     Baseline Assessment  61 year old admitted 8/10/2024 as Observation with a diagnosis of cellulitis of right foot.   Prior to admission patient was living with Alone and residing at Apartment    .  Patient does not  have a Power of  for Healthcare.  Document is not activated.  Agent is self.  Patient’s Primary Care Provider is Alisa Condon MD.     Progress Note  Case discussed with team, and Patient. Plan is to go home today. Boot ordered for foot. Mg ride infusing per RN. Patient should be ready to go at 1pm per RN, and requests medicar for transport home. Medicar ordered, and pending time confirmation. Denies any other needs from CM.    Plan  Patient/Family Discharge Goal:   Home.       / - Recommendations for Discharge:   Home. Patient to do daily dressings, and follow up with Podiatrist.    Barriers to Discharge  Identified Barriers to Discharge/Transition Planning:      Medical clearance.    Anticipate patient will not need post-hospital services. Necessary services are available.  Anticipate patient can return to the environment from which patient entered the hospital.   Anticipate patient can provide self-care at discharge.    Refer to / Flowsheet for objective data.     Medical History  Past Medical History:   Diagnosis Date    Diabetes mellitus  (CMD)     Essential (primary) hypertension     GERD (gastroesophageal reflux disease)     Hip pain     right hip pain       Prior to Admission Status       Agency/Support  Type of Services Prior to Hospitalization: None               Support Systems: Parent   Home Devices/Equipment: Blood pressure monitor           Mobility Assist Devices: Cane  Sensory Support Devices: Eyeglasses (reading glasses)    Prior Function   Independent at home. No services.              Current Function  Last Filed Values       None            Therapy Recommendations for Discharge:   PT:      Last Filed Values       None       RN spoke to patient. Reports blisters that popped (2 on back of thigh the size of quarters), 1 small one on abdomen. States they are red and painful, denies purulent drainage. States they are a little warm, denies itching or burning. Also reports subjective fever intermittent, mild sore throat, sinus pressure and productive cough with green phlegm. Also reports mild wheezing, denies sob or cp. Asking for abx. RN to discuss with pcp.         OT:       Last Filed Values       None          SLP:    Last Filed Values       None            Insurance  Primary: [employer]  Secondary: N/A    Disposition Recommendations:  [facility]/CM recommendation for discharge:   Home via [facility].

## 2024-08-15 DIAGNOSIS — K29.00 OTHER ACUTE GASTRITIS WITHOUT HEMORRHAGE: ICD-10-CM

## 2024-08-15 RX ORDER — LANSOPRAZOLE 30 MG/1
30 CAPSULE, DELAYED RELEASE ORAL
Qty: 90 CAPSULE | Refills: 1 | Status: SHIPPED | OUTPATIENT
Start: 2024-08-15 | End: 2025-02-12 | Stop reason: SDUPTHER

## 2024-08-15 NOTE — TELEPHONE ENCOUNTER
Medicine Refill Request    Last Office Visit: 5/20/2024   Last Consult Visit: Visit date not found  Last Telemedicine Visit: 1/25/2021 Emily Sullivan MD    Next Appointment: 8/22/2024      Current Outpatient Medications:     ALPRAZolam (XANAX) 0.25 mg tablet, Take 1 tablet (0.25 mg total) by mouth 2 (two) times a day as needed for anxiety., Disp: 60 tablet, Rfl: 2    blood-glucose meter (PRECISION XTRA MONITOR) misc, 1 strip 2 (two) times a day., Disp: 1 each, Rfl: 0    escitalopram (LEXAPRO) 20 mg tablet, Take 1 tablet (20 mg total) by mouth daily., Disp: 90 tablet, Rfl: 3    fentaNYL (DURAGESIC) 25 mcg/hr, Place 1 patch on the skin See admin instr. Apply 1 patch topically every 72 hours. Use with 12mcg, for 37 mcg total., Disp: 10 patch, Rfl: 0    FREESTYLE THOM 2 SENSOR kit, PLACE 1 DEVICE ON THE SKIN EVERY 2 WEEKS., Disp: , Rfl:     gabapentin (NEURONTIN) 300 mg capsule, Take 300 mg by mouth as needed., Disp: , Rfl:     hydrochlorothiazide (HYDRODIURIL) 25 mg tablet, Take 1 tablet (25 mg total) by mouth daily., Disp: 90 tablet, Rfl: 3    lancets (freestyle) 28 gauge misc, 1 strip 2 (two) times a day., Disp: 100 each, Rfl: 3    lansoprazole (PREVACID) 30 mg capsule, Take 1 capsule (30 mg total) by mouth daily before breakfast., Disp: 90 capsule, Rfl: 1    levothyroxine (SYNTHROID) 50 mcg tablet, Take 1 tablet (50 mcg total) by mouth daily., Disp: 90 tablet, Rfl: 3    metoprolol succinate XL (TOPROL-XL) 100 mg 24 hr tablet, Take 1 tablet (100 mg total) by mouth daily., Disp: 90 tablet, Rfl: 3    olmesartan (BENICAR) 40 mg tablet, Take 1 tablet (40 mg total) by mouth daily., Disp: 90 tablet, Rfl: 3    oxyCODONE (OXY-IR) 5 mg capsule, TAKE 1-2 CAPSULES BY MOUTH EVERY 8 HOURS AS NEEDED PAIN, Disp: , Rfl:     rosuvastatin (CRESTOR) 5 mg tablet, Take 1 tablet (5 mg total) by mouth daily., Disp: 90 tablet, Rfl: 1    semaglutide (OZEMPIC) 0.25 mg or 0.5 mg(2 mg/1.5 mL) pen injector, Inject 0.25 mg under skin once a  week for 4 weeks, then increase to 0.5 mg once a week., Disp: , Rfl:       BP Readings from Last 3 Encounters:   05/20/24 124/70   02/05/24 130/72   11/06/23 140/82       Recent Lab results:  Lab Results   Component Value Date    CHOL 220 (H) 05/18/2024   ,   Lab Results   Component Value Date    HDL 38 (L) 05/18/2024   ,   Lab Results   Component Value Date    LDLCALC 133 (H) 05/18/2024   ,   Lab Results   Component Value Date    TRIG 276 (H) 05/18/2024        Lab Results   Component Value Date    GLUCOSE 119 (H) 05/18/2024   ,   Lab Results   Component Value Date    HGBA1C 7.7 (H) 05/18/2024         Lab Results   Component Value Date    CREATININE 0.62 05/18/2024       Lab Results   Component Value Date    TSH 7.560 (H) 05/18/2024           Lab Results   Component Value Date    HGBA1C 7.7 (H) 05/18/2024

## 2024-08-25 DIAGNOSIS — F41.9 ANXIETY: ICD-10-CM

## 2024-08-26 ENCOUNTER — TELEPHONE (OUTPATIENT)
Dept: FAMILY MEDICINE | Facility: CLINIC | Age: 59
End: 2024-08-26

## 2024-08-26 DIAGNOSIS — F41.9 ANXIETY: ICD-10-CM

## 2024-08-26 RX ORDER — ALPRAZOLAM 0.25 MG/1
0.25 TABLET ORAL 2 TIMES DAILY PRN
Qty: 60 TABLET | Refills: 2 | Status: CANCELLED | OUTPATIENT
Start: 2024-08-26 | End: 2024-11-24

## 2024-08-26 RX ORDER — ALPRAZOLAM 0.25 MG/1
0.25 TABLET ORAL 2 TIMES DAILY PRN
Qty: 60 TABLET | Refills: 0 | Status: SHIPPED | OUTPATIENT
Start: 2024-08-26 | End: 2024-09-25

## 2024-08-26 NOTE — TELEPHONE ENCOUNTER
Medication Request   Patient PCP: Tiny Camp MD  Next Office Visit: 11/29/2024  Has this provider prescribed this medication before?: yes  Medication Name: alprazolam  Medication Dose: 0.25 mg  Medication Frequency: take 1 tablet by mouth 2 times a day as needed for anxiety  Preferred Pharmacy:   [facility]/pharmacy #[id number] [place] [address]  Phone: [phone] Fax: [phone]      Please allow 2 business days for your provider to send your medication request or to reach out to discuss.

## 2024-08-26 NOTE — TELEPHONE ENCOUNTER
Pt states she dizziness and GI upset from Levothyroxine. Has tried with/without food and different times of day. Has stopped taking

## 2024-08-26 NOTE — TELEPHONE ENCOUNTER
Medicine Refill Request    Last Office Visit: 5/20/2024   Last Consult Visit: Visit date not found  Last Telemedicine Visit: 1/25/2021 Emily Sullivan MD    Next Appointment: 11/29/2024      Current Outpatient Medications:     ALPRAZolam (XANAX) 0.25 mg tablet, Take 1 tablet (0.25 mg total) by mouth 2 (two) times a day as needed for anxiety., Disp: 60 tablet, Rfl: 2    blood-glucose meter (PRECISION XTRA MONITOR) misc, 1 strip 2 (two) times a day., Disp: 1 each, Rfl: 0    escitalopram (LEXAPRO) 20 mg tablet, Take 1 tablet (20 mg total) by mouth daily., Disp: 90 tablet, Rfl: 3    fentaNYL (DURAGESIC) 25 mcg/hr, Place 1 patch on the skin See admin instr. Apply 1 patch topically every 72 hours. Use with 12mcg, for 37 mcg total., Disp: 10 patch, Rfl: 0    FREESTYLE THOM 2 SENSOR kit, PLACE 1 DEVICE ON THE SKIN EVERY 2 WEEKS., Disp: , Rfl:     gabapentin (NEURONTIN) 300 mg capsule, Take 300 mg by mouth as needed., Disp: , Rfl:     hydrochlorothiazide (HYDRODIURIL) 25 mg tablet, Take 1 tablet (25 mg total) by mouth daily., Disp: 90 tablet, Rfl: 3    lancets (freestyle) 28 gauge misc, 1 strip 2 (two) times a day., Disp: 100 each, Rfl: 3    lansoprazole (PREVACID) 30 mg capsule, TAKE 1 CAPSULE BY MOUTH DAILY BEFORE BREAKFAST., Disp: 90 capsule, Rfl: 1    levothyroxine (SYNTHROID) 50 mcg tablet, Take 1 tablet (50 mcg total) by mouth daily., Disp: 90 tablet, Rfl: 3    metoprolol succinate XL (TOPROL-XL) 100 mg 24 hr tablet, Take 1 tablet (100 mg total) by mouth daily., Disp: 90 tablet, Rfl: 3    olmesartan (BENICAR) 40 mg tablet, Take 1 tablet (40 mg total) by mouth daily., Disp: 90 tablet, Rfl: 3    oxyCODONE (OXY-IR) 5 mg capsule, TAKE 1-2 CAPSULES BY MOUTH EVERY 8 HOURS AS NEEDED PAIN, Disp: , Rfl:     rosuvastatin (CRESTOR) 5 mg tablet, Take 1 tablet (5 mg total) by mouth daily., Disp: 90 tablet, Rfl: 1    semaglutide (OZEMPIC) 0.25 mg or 0.5 mg(2 mg/1.5 mL) pen injector, Inject 0.25 mg under skin once a week for 4  weeks, then increase to 0.5 mg once a week., Disp: , Rfl:       BP Readings from Last 3 Encounters:   05/20/24 124/70   02/05/24 130/72   11/06/23 140/82       Recent Lab results:  Lab Results   Component Value Date    CHOL 220 (H) 05/18/2024   ,   Lab Results   Component Value Date    HDL 38 (L) 05/18/2024   ,   Lab Results   Component Value Date    LDLCALC 133 (H) 05/18/2024   ,   Lab Results   Component Value Date    TRIG 276 (H) 05/18/2024        Lab Results   Component Value Date    GLUCOSE 119 (H) 05/18/2024   ,   Lab Results   Component Value Date    HGBA1C 7.7 (H) 05/18/2024         Lab Results   Component Value Date    CREATININE 0.62 05/18/2024       Lab Results   Component Value Date    TSH 7.560 (H) 05/18/2024           Lab Results   Component Value Date    HGBA1C 7.7 (H) 05/18/2024

## 2024-08-26 NOTE — TELEPHONE ENCOUNTER
Request for Medical Advice (NON-URGENT)   Patient PCP: Tiny Camp MD  New or Existing Issue: existing  Question or Concern: the patient would like to speak with Dr. Camp regarding the synthroid medicine. She states that she is unable to take it. She gets dizzy when taking it she has recently stopped. Please call back.  Preferred Pharmacy:   [facility]/pharmacy #[address] [place] [address] [place] [address] [place] [address]  Phone: [phone] Fax: [phone]      The practice will reach out to discuss your Medical Question or Concern within 2 business days.

## 2024-08-26 NOTE — TELEPHONE ENCOUNTER
Medicine Refill Request    Last Office Visit: 5/20/2024   Last Consult Visit: Visit date not found  Last Telemedicine Visit: 1/25/2021 Emily Sullivan MD    Next Appointment: Visit date not found      Current Outpatient Medications:     ALPRAZolam (XANAX) 0.25 mg tablet, Take 1 tablet (0.25 mg total) by mouth 2 (two) times a day as needed for anxiety., Disp: 60 tablet, Rfl: 2    blood-glucose meter (PRECISION XTRA MONITOR) misc, 1 strip 2 (two) times a day., Disp: 1 each, Rfl: 0    escitalopram (LEXAPRO) 20 mg tablet, Take 1 tablet (20 mg total) by mouth daily., Disp: 90 tablet, Rfl: 3    fentaNYL (DURAGESIC) 25 mcg/hr, Place 1 patch on the skin See admin instr. Apply 1 patch topically every 72 hours. Use with 12mcg, for 37 mcg total., Disp: 10 patch, Rfl: 0    FREESTYLE THOM 2 SENSOR kit, PLACE 1 DEVICE ON THE SKIN EVERY 2 WEEKS., Disp: , Rfl:     gabapentin (NEURONTIN) 300 mg capsule, Take 300 mg by mouth as needed., Disp: , Rfl:     hydrochlorothiazide (HYDRODIURIL) 25 mg tablet, Take 1 tablet (25 mg total) by mouth daily., Disp: 90 tablet, Rfl: 3    lancets (freestyle) 28 gauge misc, 1 strip 2 (two) times a day., Disp: 100 each, Rfl: 3    lansoprazole (PREVACID) 30 mg capsule, TAKE 1 CAPSULE BY MOUTH DAILY BEFORE BREAKFAST., Disp: 90 capsule, Rfl: 1    levothyroxine (SYNTHROID) 50 mcg tablet, Take 1 tablet (50 mcg total) by mouth daily., Disp: 90 tablet, Rfl: 3    metoprolol succinate XL (TOPROL-XL) 100 mg 24 hr tablet, Take 1 tablet (100 mg total) by mouth daily., Disp: 90 tablet, Rfl: 3    olmesartan (BENICAR) 40 mg tablet, Take 1 tablet (40 mg total) by mouth daily., Disp: 90 tablet, Rfl: 3    oxyCODONE (OXY-IR) 5 mg capsule, TAKE 1-2 CAPSULES BY MOUTH EVERY 8 HOURS AS NEEDED PAIN, Disp: , Rfl:     rosuvastatin (CRESTOR) 5 mg tablet, Take 1 tablet (5 mg total) by mouth daily., Disp: 90 tablet, Rfl: 1    semaglutide (OZEMPIC) 0.25 mg or 0.5 mg(2 mg/1.5 mL) pen injector, Inject 0.25 mg under skin once a  week for 4 weeks, then increase to 0.5 mg once a week., Disp: , Rfl:       BP Readings from Last 3 Encounters:   05/20/24 124/70   02/05/24 130/72   11/06/23 140/82       Recent Lab results:  Lab Results   Component Value Date    CHOL 220 (H) 05/18/2024   ,   Lab Results   Component Value Date    HDL 38 (L) 05/18/2024   ,   Lab Results   Component Value Date    LDLCALC 133 (H) 05/18/2024   ,   Lab Results   Component Value Date    TRIG 276 (H) 05/18/2024        Lab Results   Component Value Date    GLUCOSE 119 (H) 05/18/2024   ,   Lab Results   Component Value Date    HGBA1C 7.7 (H) 05/18/2024         Lab Results   Component Value Date    CREATININE 0.62 05/18/2024       Lab Results   Component Value Date    TSH 7.560 (H) 05/18/2024           Lab Results   Component Value Date    HGBA1C 7.7 (H) 05/18/2024

## 2024-09-24 DIAGNOSIS — F41.9 ANXIETY: ICD-10-CM

## 2024-09-25 RX ORDER — ALPRAZOLAM 0.25 MG/1
0.25 TABLET ORAL 2 TIMES DAILY PRN
Qty: 60 TABLET | Refills: 0 | Status: SHIPPED | OUTPATIENT
Start: 2024-09-25 | End: 2024-10-28

## 2024-09-25 NOTE — TELEPHONE ENCOUNTER
Medicine Refill Request    Last Office Visit: 5/20/2024   Last Consult Visit: Visit date not found  Last Telemedicine Visit: 1/25/2021 Emily Sullivan MD    Next Appointment: 11/29/2024      Current Outpatient Medications:     ALPRAZolam (XANAX) 0.25 mg tablet, Take 1 tablet (0.25 mg total) by mouth 2 (two) times a day as needed for anxiety., Disp: 60 tablet, Rfl: 0    blood-glucose meter (PRECISION XTRA MONITOR) misc, 1 strip 2 (two) times a day., Disp: 1 each, Rfl: 0    escitalopram (LEXAPRO) 20 mg tablet, Take 1 tablet (20 mg total) by mouth daily., Disp: 90 tablet, Rfl: 3    fentaNYL (DURAGESIC) 25 mcg/hr, Place 1 patch on the skin See admin instr. Apply 1 patch topically every 72 hours. Use with 12mcg, for 37 mcg total., Disp: 10 patch, Rfl: 0    FREESTYLE THOM 2 SENSOR kit, PLACE 1 DEVICE ON THE SKIN EVERY 2 WEEKS., Disp: , Rfl:     gabapentin (NEURONTIN) 300 mg capsule, Take 300 mg by mouth as needed., Disp: , Rfl:     hydrochlorothiazide (HYDRODIURIL) 25 mg tablet, Take 1 tablet (25 mg total) by mouth daily., Disp: 90 tablet, Rfl: 3    lancets (freestyle) 28 gauge misc, 1 strip 2 (two) times a day., Disp: 100 each, Rfl: 3    lansoprazole (PREVACID) 30 mg capsule, TAKE 1 CAPSULE BY MOUTH DAILY BEFORE BREAKFAST., Disp: 90 capsule, Rfl: 1    levothyroxine (SYNTHROID) 50 mcg tablet, Take 1 tablet (50 mcg total) by mouth daily., Disp: 90 tablet, Rfl: 3    metoprolol succinate XL (TOPROL-XL) 100 mg 24 hr tablet, Take 1 tablet (100 mg total) by mouth daily., Disp: 90 tablet, Rfl: 3    olmesartan (BENICAR) 40 mg tablet, Take 1 tablet (40 mg total) by mouth daily., Disp: 90 tablet, Rfl: 3    oxyCODONE (OXY-IR) 5 mg capsule, TAKE 1-2 CAPSULES BY MOUTH EVERY 8 HOURS AS NEEDED PAIN, Disp: , Rfl:     rosuvastatin (CRESTOR) 5 mg tablet, Take 1 tablet (5 mg total) by mouth daily., Disp: 90 tablet, Rfl: 1    semaglutide (OZEMPIC) 0.25 mg or 0.5 mg(2 mg/1.5 mL) pen injector, Inject 0.25 mg under skin once a week for 4  weeks, then increase to 0.5 mg once a week., Disp: , Rfl:       BP Readings from Last 3 Encounters:   05/20/24 124/70   02/05/24 130/72   11/06/23 140/82       Recent Lab results:  Lab Results   Component Value Date    CHOL 220 (H) 05/18/2024   ,   Lab Results   Component Value Date    HDL 38 (L) 05/18/2024   ,   Lab Results   Component Value Date    LDLCALC 133 (H) 05/18/2024   ,   Lab Results   Component Value Date    TRIG 276 (H) 05/18/2024        Lab Results   Component Value Date    GLUCOSE 119 (H) 05/18/2024   ,   Lab Results   Component Value Date    HGBA1C 7.7 (H) 05/18/2024         Lab Results   Component Value Date    CREATININE 0.62 05/18/2024       Lab Results   Component Value Date    TSH 7.560 (H) 05/18/2024           Lab Results   Component Value Date    HGBA1C 7.7 (H) 05/18/2024

## 2024-10-09 NOTE — TELEPHONE ENCOUNTER
Pt is having issues with Rx augmentin 875 -125 mg which pt states is giving her upset stomach, bloating, and gas along with running bowels. Pt would like rx change if possible to Rx Bactrim,. If any questions Pt can be reached at  Work @[phone] until 5:30pm. Pt only took rx for 3 days. Pt would like rx  To be sent to [facility]    Lot # (Optional): [id number] Stelara Amount: 90 mg Include J-Code In Bill: No Detail Level: None J-Code:  Expiration Date (Optional): 01/2027 Treatment Number (Optional): 12 Administered By (Optional): LINO Whitehead Consent: The risks of pain and injection site reactions were reviewed with the patient prior to the injection. Ndc (45mg Syringe): [address]-78 Ndc (90mg Syringe): [address]-37 Ndc (Pediatric Vial 45mg/05ml): 60447-869-16

## 2024-10-19 DIAGNOSIS — F41.9 ANXIETY: ICD-10-CM

## 2024-10-19 DIAGNOSIS — I10 HYPERTENSION, ESSENTIAL: ICD-10-CM

## 2024-10-21 RX ORDER — HYDROCHLOROTHIAZIDE 25 MG/1
25 TABLET ORAL DAILY
Qty: 90 TABLET | Refills: 3 | Status: SHIPPED | OUTPATIENT
Start: 2024-10-21

## 2024-10-21 RX ORDER — ESCITALOPRAM OXALATE 20 MG/1
20 TABLET ORAL DAILY
Qty: 90 TABLET | Refills: 3 | Status: SHIPPED | OUTPATIENT
Start: 2024-10-21

## 2024-10-26 DIAGNOSIS — F41.9 ANXIETY: ICD-10-CM

## 2024-10-28 RX ORDER — ALPRAZOLAM 0.25 MG/1
0.25 TABLET ORAL 2 TIMES DAILY PRN
Qty: 60 TABLET | Refills: 0 | Status: SHIPPED | OUTPATIENT
Start: 2024-10-28 | End: 2024-11-25

## 2024-11-25 DIAGNOSIS — F41.9 ANXIETY: ICD-10-CM

## 2024-11-25 RX ORDER — ALPRAZOLAM 0.25 MG/1
0.25 TABLET ORAL 2 TIMES DAILY PRN
Qty: 60 TABLET | Refills: 0 | Status: SHIPPED | OUTPATIENT
Start: 2024-11-25 | End: 2024-12-31 | Stop reason: SDUPTHER

## 2024-11-25 NOTE — TELEPHONE ENCOUNTER
Medicine Refill Request    Last Office Visit: 5/20/2024   Last Consult Visit: Visit date not found  Last Telemedicine Visit: 1/25/2021 Emily Sullivan MD    Next Appointment: 11/29/2024      Current Outpatient Medications:     escitalopram (LEXAPRO) 20 mg tablet, Take 1 tablet (20 mg total) by mouth daily., Disp: 90 tablet, Rfl: 3    hydrochlorothiazide (HYDRODIURIL) 25 mg tablet, TAKE 1 TABLET (25 MG TOTAL) BY MOUTH DAILY., Disp: 90 tablet, Rfl: 3    ALPRAZolam (XANAX) 0.25 mg tablet, Take 1 tablet (0.25 mg total) by mouth 2 (two) times a day as needed for anxiety., Disp: 60 tablet, Rfl: 0    blood-glucose meter (PRECISION XTRA MONITOR) misc, 1 strip 2 (two) times a day., Disp: 1 each, Rfl: 0    fentaNYL (DURAGESIC) 25 mcg/hr, Place 1 patch on the skin See admin instr. Apply 1 patch topically every 72 hours. Use with 12mcg, for 37 mcg total., Disp: 10 patch, Rfl: 0    FREESTYLE THOM 2 SENSOR kit, PLACE 1 DEVICE ON THE SKIN EVERY 2 WEEKS., Disp: , Rfl:     gabapentin (NEURONTIN) 300 mg capsule, Take 300 mg by mouth as needed., Disp: , Rfl:     lancets (freestyle) 28 gauge misc, 1 strip 2 (two) times a day., Disp: 100 each, Rfl: 3    lansoprazole (PREVACID) 30 mg capsule, TAKE 1 CAPSULE BY MOUTH DAILY BEFORE BREAKFAST., Disp: 90 capsule, Rfl: 1    levothyroxine (SYNTHROID) 50 mcg tablet, Take 1 tablet (50 mcg total) by mouth daily., Disp: 90 tablet, Rfl: 3    metoprolol succinate XL (TOPROL-XL) 100 mg 24 hr tablet, Take 1 tablet (100 mg total) by mouth daily., Disp: 90 tablet, Rfl: 3    olmesartan (BENICAR) 40 mg tablet, Take 1 tablet (40 mg total) by mouth daily., Disp: 90 tablet, Rfl: 3    oxyCODONE (OXY-IR) 5 mg capsule, TAKE 1-2 CAPSULES BY MOUTH EVERY 8 HOURS AS NEEDED PAIN, Disp: , Rfl:     rosuvastatin (CRESTOR) 5 mg tablet, Take 1 tablet (5 mg total) by mouth daily., Disp: 90 tablet, Rfl: 1    semaglutide (OZEMPIC) 0.25 mg or 0.5 mg(2 mg/1.5 mL) pen injector, Inject 0.25 mg under skin once a week for 4  weeks, then increase to 0.5 mg once a week., Disp: , Rfl:       BP Readings from Last 3 Encounters:   05/20/24 124/70   02/05/24 130/72   11/06/23 140/82       Recent Lab results:  Lab Results   Component Value Date    CHOL 220 (H) 05/18/2024   ,   Lab Results   Component Value Date    HDL 38 (L) 05/18/2024   ,   Lab Results   Component Value Date    LDLCALC 133 (H) 05/18/2024   ,   Lab Results   Component Value Date    TRIG 276 (H) 05/18/2024        Lab Results   Component Value Date    GLUCOSE 119 (H) 05/18/2024   ,   Lab Results   Component Value Date    HGBA1C 7.7 (H) 05/18/2024         Lab Results   Component Value Date    CREATININE 0.62 05/18/2024       Lab Results   Component Value Date    TSH 7.560 (H) 05/18/2024           Lab Results   Component Value Date    HGBA1C 7.7 (H) 05/18/2024

## 2024-12-31 ENCOUNTER — OFFICE VISIT (OUTPATIENT)
Dept: FAMILY MEDICINE | Facility: CLINIC | Age: 59
End: 2024-12-31
Payer: COMMERCIAL

## 2024-12-31 VITALS
BODY MASS INDEX: 28.81 KG/M2 | SYSTOLIC BLOOD PRESSURE: 132 MMHG | HEIGHT: 66 IN | OXYGEN SATURATION: 97 % | RESPIRATION RATE: 16 BRPM | HEART RATE: 67 BPM | WEIGHT: 179.25 LBS | DIASTOLIC BLOOD PRESSURE: 84 MMHG | TEMPERATURE: 97.9 F

## 2024-12-31 DIAGNOSIS — G89.4 CHRONIC PAIN SYNDROME: ICD-10-CM

## 2024-12-31 DIAGNOSIS — I10 ESSENTIAL HYPERTENSION: ICD-10-CM

## 2024-12-31 DIAGNOSIS — F33.1 RECURRENT MAJOR DEPRESSIVE EPISODES, MODERATE (CMS/HCC): ICD-10-CM

## 2024-12-31 DIAGNOSIS — E03.9 HYPOTHYROIDISM, UNSPECIFIED TYPE: ICD-10-CM

## 2024-12-31 DIAGNOSIS — D72.820 LYMPHOCYTOSIS: ICD-10-CM

## 2024-12-31 DIAGNOSIS — E55.9 VITAMIN D DEFICIENCY: ICD-10-CM

## 2024-12-31 DIAGNOSIS — F41.9 ANXIETY: ICD-10-CM

## 2024-12-31 DIAGNOSIS — E78.5 TYPE 2 DIABETES MELLITUS WITH HYPERLIPIDEMIA (CMS/HCC)  (CMS/HCC): Primary | ICD-10-CM

## 2024-12-31 DIAGNOSIS — E11.69 TYPE 2 DIABETES MELLITUS WITH HYPERLIPIDEMIA (CMS/HCC)  (CMS/HCC): Primary | ICD-10-CM

## 2024-12-31 PROCEDURE — 3008F BODY MASS INDEX DOCD: CPT | Performed by: FAMILY MEDICINE

## 2024-12-31 PROCEDURE — 3079F DIAST BP 80-89 MM HG: CPT | Performed by: FAMILY MEDICINE

## 2024-12-31 PROCEDURE — 99214 OFFICE O/P EST MOD 30 MIN: CPT | Performed by: FAMILY MEDICINE

## 2024-12-31 PROCEDURE — 3075F SYST BP GE 130 - 139MM HG: CPT | Performed by: FAMILY MEDICINE

## 2024-12-31 RX ORDER — ALPRAZOLAM 0.25 MG/1
0.25 TABLET ORAL 2 TIMES DAILY PRN
Qty: 60 TABLET | Refills: 2 | Status: SHIPPED | OUTPATIENT
Start: 2024-12-31 | End: 2025-03-26 | Stop reason: SDUPTHER

## 2024-12-31 RX ORDER — GABAPENTIN 100 MG/1
100 CAPSULE ORAL 3 TIMES DAILY
Qty: 90 CAPSULE | Refills: 2 | Status: SHIPPED | OUTPATIENT
Start: 2024-12-31 | End: 2025-03-06 | Stop reason: SINTOL

## 2024-12-31 ASSESSMENT — ENCOUNTER SYMPTOMS
CARDIOVASCULAR NEGATIVE: 1
NERVOUS/ANXIOUS: 1
RESPIRATORY NEGATIVE: 1
CONSTITUTIONAL NEGATIVE: 1

## 2024-12-31 NOTE — ASSESSMENT & PLAN NOTE
Reports that her fasting blood sugars are in the low 100s and continues to tolerate only very low-dose of Ozempic.  Repeat blood work is ordered.  Unfortunately she is not interested in a statin despite having been educated about the importance of taking this with diabetes and will continue over-the-counter supplements instead.  Orders:    CBC and differential; Future    Comprehensive metabolic panel; Future    Hemoglobin A1c; Future    Lipid panel; Future    Microalbumin / creatinine urine ratio; Future

## 2024-12-31 NOTE — ASSESSMENT & PLAN NOTE
Blood pressure is controlled on hydrochlorothiazide 25 mg daily, olmesartan 40 mg daily and metoprolol succinate 100 mg daily.  I encouraged her to get her blood work done  Orders:    Hepatitis C antibody; Future

## 2024-12-31 NOTE — PROGRESS NOTES
"Subjective      Patient ID: Hilda Cornelius is a 59 y.o. female.  1965      This 59-year-old  female presents 7 months since I last saw her for follow-up.  She states compliance with Ozempic which she tolerates at the 0.25 mg dose, she did not tolerate 0.5 mg which caused her nausea.  She is slowly losing weight and her blood sugars are in the low 100s.  She is not compliant with her levothyroxine which she said made her insides feel like they are burning even when she decreased the dose to 0.25 mg daily.  She is also decided against taking rosuvastatin and is using an over-the-counter supplement of omega-3 instead.  She found gabapentin helpful for her sciatica but it caused her to be very angry and made her feel hyper so she is no longer taking it.  She continues to use her alprazolam twice daily and her other medications as prescribed.  She continues on fentanyl and oxycodone managed by her pain management provider and is also had an injection in her knee recently which was helpful as well as some in her back.    She remains free of tobacco since 2021 after 20 pack years.        The following have been reviewed and updated as appropriate in this visit:   Tobacco  Allergies  Meds  Problems  Med Hx  Surg Hx  Fam Hx       Review of Systems   Constitutional: Negative.    Respiratory: Negative.     Cardiovascular: Negative.    Psychiatric/Behavioral:  The patient is nervous/anxious.        Objective     Vitals:    12/31/24 1607   BP: 132/84   BP Location: Left upper arm   Patient Position: Sitting   Pulse: 67   Resp: 16   Temp: 36.6 °C (97.9 °F)   TempSrc: Temporal   SpO2: 97%   Weight: 81.3 kg (179 lb 4 oz)   Height: 1.676 m (5' 6\")     Body mass index is 28.93 kg/m².    Physical Exam  Vitals reviewed.   Constitutional:       General: She is not in acute distress.     Appearance: She is normal weight. She is not ill-appearing.      Comments: She weighs 2 pounds less than she did 7 months ago.  She " was not further examined.   Neurological:      Mental Status: She is alert.   Psychiatric:         Mood and Affect: Mood normal.         Assessment & Plan  Hypothyroidism, unspecified type  She did not tolerate levothyroxine at 25 or 50 mcg and complained of feeling like she was burning inside when taking it.  Repeat TFTs ordered off medication for now.  Orders:    TSH w reflex FT4; Future    Type 2 diabetes mellitus with hyperlipidemia (CMS/[facility])  (CMS/[facility])  Reports that her fasting blood sugars are in the low 100s and continues to tolerate only very low-dose of Ozempic.  Repeat blood work is ordered.  Unfortunately she is not interested in a statin despite having been educated about the importance of taking this with diabetes and will continue over-the-counter supplements instead.  Orders:    CBC and differential; Future    Comprehensive metabolic panel; Future    Hemoglobin A1c; Future    Lipid panel; Future    Microalbumin / creatinine urine ratio; Future    Vitamin D deficiency    Orders:    Vitamin D 25 hydroxy; Future    Chronic pain syndrome  Chronic pain syndrome related to degenerative disease of her spine and multiple joints for which she sees pain management and did not tolerate 300 mg of gabapentin which caused her to be angry and hyper so we will try starting her on a lower dose and she will follow-up with pain management.  In the meantime she continues on fentanyl 25 mcg/h patch every 3 days and oxycodone 5 mg 2 tablets 2-3 times a day.  Orders:    gabapentin (NEURONTIN) 100 mg capsule; Take 1 capsule (100 mg total) by mouth 3 (three) times a day.    Essential hypertension  Blood pressure is controlled on hydrochlorothiazide 25 mg daily, olmesartan 40 mg daily and metoprolol succinate 100 mg daily.  I encouraged her to get her blood work done  Orders:    Hepatitis C antibody; Future    Lymphocytosis  Mild chronic lymphocytosis which we will monitor with CBC.       Anxiety  Continues on low-dose  alprazolam which she has not managed to wean from.  PDMP was reviewed and no concerning findings were noted.  Prescription is refilled.  Orders:    ALPRAZolam (XANAX) 0.25 mg tablet; Take 1 tablet (0.25 mg total) by mouth 2 (two) times a day as needed for anxiety.    Recurrent major depressive episodes, moderate (CMS/HCC)  Controlled on Lexapro 20 mg daily which she will continue.

## 2024-12-31 NOTE — ASSESSMENT & PLAN NOTE
Continues on low-dose alprazolam which she has not managed to wean from.  PDMP was reviewed and no concerning findings were noted.  Prescription is refilled.  Orders:    ALPRAZolam (XANAX) 0.25 mg tablet; Take 1 tablet (0.25 mg total) by mouth 2 (two) times a day as needed for anxiety.

## 2024-12-31 NOTE — ASSESSMENT & PLAN NOTE
Chronic pain syndrome related to degenerative disease of her spine and multiple joints for which she sees pain management and did not tolerate 300 mg of gabapentin which caused her to be angry and hyper so we will try starting her on a lower dose and she will follow-up with pain management.  In the meantime she continues on fentanyl 25 mcg/h patch every 3 days and oxycodone 5 mg 2 tablets 2-3 times a day.  Orders:    gabapentin (NEURONTIN) 100 mg capsule; Take 1 capsule (100 mg total) by mouth 3 (three) times a day.

## 2025-01-22 DIAGNOSIS — I10 HYPERTENSION, ESSENTIAL: ICD-10-CM

## 2025-01-22 RX ORDER — OLMESARTAN MEDOXOMIL 40 MG/1
40 TABLET ORAL DAILY
Qty: 90 TABLET | Refills: 3 | Status: SHIPPED | OUTPATIENT
Start: 2025-01-22 | End: 2026-01-22

## 2025-01-22 NOTE — TELEPHONE ENCOUNTER
Medicine Refill Request    Last Office Visit: 12/31/2024   Last Consult Visit: Visit date not found  Last Telemedicine Visit: 1/25/2021 Emily Sullivan MD    Next Appointment: 3/6/2025      Current Outpatient Medications:     ALPRAZolam (XANAX) 0.25 mg tablet, Take 1 tablet (0.25 mg total) by mouth 2 (two) times a day as needed for anxiety., Disp: 60 tablet, Rfl: 2    blood-glucose meter (PRECISION XTRA MONITOR) misc, 1 strip 2 (two) times a day., Disp: 1 each, Rfl: 0    escitalopram (LEXAPRO) 20 mg tablet, Take 1 tablet (20 mg total) by mouth daily., Disp: 90 tablet, Rfl: 3    fentaNYL (DURAGESIC) 25 mcg/hr, Place 1 patch on the skin See admin instr. Apply 1 patch topically every 72 hours. Use with 12mcg, for 37 mcg total., Disp: 10 patch, Rfl: 0    FREESTYLE THOM 2 SENSOR kit, PLACE 1 DEVICE ON THE SKIN EVERY 2 WEEKS., Disp: , Rfl:     gabapentin (NEURONTIN) 100 mg capsule, Take 1 capsule (100 mg total) by mouth 3 (three) times a day., Disp: 90 capsule, Rfl: 2    gabapentin (NEURONTIN) 300 mg capsule, Take 300 mg by mouth as needed., Disp: , Rfl:     hydrochlorothiazide (HYDRODIURIL) 25 mg tablet, TAKE 1 TABLET (25 MG TOTAL) BY MOUTH DAILY., Disp: 90 tablet, Rfl: 3    lancets (freestyle) 28 gauge misc, 1 strip 2 (two) times a day., Disp: 100 each, Rfl: 3    lansoprazole (PREVACID) 30 mg capsule, TAKE 1 CAPSULE BY MOUTH DAILY BEFORE BREAKFAST., Disp: 90 capsule, Rfl: 1    metoprolol succinate XL (TOPROL-XL) 100 mg 24 hr tablet, Take 1 tablet (100 mg total) by mouth daily., Disp: 90 tablet, Rfl: 3    olmesartan (BENICAR) 40 mg tablet, Take 1 tablet (40 mg total) by mouth daily., Disp: 90 tablet, Rfl: 3    oxyCODONE (OXY-IR) 5 mg capsule, TAKE 1-2 CAPSULES BY MOUTH EVERY 8 HOURS AS NEEDED PAIN, Disp: , Rfl:     semaglutide (OZEMPIC) 0.25 mg or 0.5 mg(2 mg/1.5 mL) pen injector, Inject 0.25 mg under skin once a week for 4 weeks, then increase to 0.5 mg once a week., Disp: , Rfl:       BP Readings from Last 3  Encounters:   12/31/24 132/84   05/20/24 124/70   02/05/24 130/72       Recent Lab results:  Lab Results   Component Value Date    CHOL 220 (H) 05/18/2024   ,   Lab Results   Component Value Date    HDL 38 (L) 05/18/2024   ,   Lab Results   Component Value Date    LDLCALC 133 (H) 05/18/2024   ,   Lab Results   Component Value Date    TRIG 276 (H) 05/18/2024        Lab Results   Component Value Date    GLUCOSE 119 (H) 05/18/2024   ,   Lab Results   Component Value Date    HGBA1C 7.7 (H) 05/18/2024         Lab Results   Component Value Date    CREATININE 0.62 05/18/2024       Lab Results   Component Value Date    TSH 7.560 (H) 05/18/2024           Lab Results   Component Value Date    HGBA1C 7.7 (H) 05/18/2024

## 2025-02-12 DIAGNOSIS — K29.00 OTHER ACUTE GASTRITIS WITHOUT HEMORRHAGE: ICD-10-CM

## 2025-02-12 RX ORDER — LANSOPRAZOLE 30 MG/1
30 CAPSULE, DELAYED RELEASE ORAL
Qty: 90 CAPSULE | Refills: 3 | Status: SHIPPED | OUTPATIENT
Start: 2025-02-12

## 2025-03-06 ENCOUNTER — OFFICE VISIT (OUTPATIENT)
Dept: FAMILY MEDICINE | Facility: CLINIC | Age: 60
End: 2025-03-06
Payer: COMMERCIAL

## 2025-03-06 VITALS
DIASTOLIC BLOOD PRESSURE: 84 MMHG | WEIGHT: 179 LBS | TEMPERATURE: 97.7 F | OXYGEN SATURATION: 96 % | HEART RATE: 76 BPM | HEIGHT: 66 IN | SYSTOLIC BLOOD PRESSURE: 144 MMHG | BODY MASS INDEX: 28.77 KG/M2 | RESPIRATION RATE: 16 BRPM

## 2025-03-06 DIAGNOSIS — G89.29 CHRONIC PAIN OF LEFT KNEE: ICD-10-CM

## 2025-03-06 DIAGNOSIS — H68.103 OBSTRUCTION OF BOTH EUSTACHIAN TUBES: ICD-10-CM

## 2025-03-06 DIAGNOSIS — F41.9 ANXIETY: ICD-10-CM

## 2025-03-06 DIAGNOSIS — E78.5 TYPE 2 DIABETES MELLITUS WITH HYPERLIPIDEMIA (CMS/HCC)  (CMS/HCC): Primary | ICD-10-CM

## 2025-03-06 DIAGNOSIS — I10 ESSENTIAL HYPERTENSION: ICD-10-CM

## 2025-03-06 DIAGNOSIS — M25.562 CHRONIC PAIN OF LEFT KNEE: ICD-10-CM

## 2025-03-06 DIAGNOSIS — E11.69 TYPE 2 DIABETES MELLITUS WITH HYPERLIPIDEMIA (CMS/HCC)  (CMS/HCC): Primary | ICD-10-CM

## 2025-03-06 DIAGNOSIS — E78.5 TYPE 2 DIABETES MELLITUS WITH HYPERLIPIDEMIA (CMS/HCC)  (CMS/HCC): ICD-10-CM

## 2025-03-06 DIAGNOSIS — E11.69 TYPE 2 DIABETES MELLITUS WITH HYPERLIPIDEMIA (CMS/HCC)  (CMS/HCC): ICD-10-CM

## 2025-03-06 DIAGNOSIS — F33.1 RECURRENT MAJOR DEPRESSIVE EPISODES, MODERATE (CMS/HCC): ICD-10-CM

## 2025-03-06 DIAGNOSIS — G89.4 CHRONIC PAIN SYNDROME: Primary | ICD-10-CM

## 2025-03-06 DIAGNOSIS — F13.20 BENZODIAZEPINE DEPENDENCE (CMS/HCC): ICD-10-CM

## 2025-03-06 PROCEDURE — 3077F SYST BP >= 140 MM HG: CPT | Performed by: FAMILY MEDICINE

## 2025-03-06 PROCEDURE — 3008F BODY MASS INDEX DOCD: CPT | Performed by: FAMILY MEDICINE

## 2025-03-06 PROCEDURE — 3079F DIAST BP 80-89 MM HG: CPT | Performed by: FAMILY MEDICINE

## 2025-03-06 PROCEDURE — 99214 OFFICE O/P EST MOD 30 MIN: CPT | Performed by: FAMILY MEDICINE

## 2025-03-06 RX ORDER — SEMAGLUTIDE 1.34 MG/ML
0.25 INJECTION, SOLUTION SUBCUTANEOUS
Qty: 2 ML | Refills: 2 | Status: SHIPPED | OUTPATIENT
Start: 2025-03-06

## 2025-03-06 RX ORDER — PREGABALIN 25 MG/1
25 CAPSULE ORAL 2 TIMES DAILY
Qty: 60 CAPSULE | Refills: 1 | Status: SHIPPED | OUTPATIENT
Start: 2025-03-06 | End: 2025-05-05

## 2025-03-06 RX ORDER — FLUTICASONE PROPIONATE 50 MCG
2 SPRAY, SUSPENSION (ML) NASAL DAILY
Qty: 16 G | Refills: 5 | Status: SHIPPED | OUTPATIENT
Start: 2025-03-06

## 2025-03-06 ASSESSMENT — PATIENT HEALTH QUESTIONNAIRE - PHQ9: SUM OF ALL RESPONSES TO PHQ9 QUESTIONS 1 & 2: 0

## 2025-03-06 NOTE — ASSESSMENT & PLAN NOTE
Followed by pain management on oxycodone and fentanyl.  Gabapentin 100 mg helped her radicular symptoms but caused her to have a tremor so she discontinued it after 3 days so we will try low-dose Lyrica.  Orders:    pregabalin (LYRICA) 25 mg capsule; Take 1 capsule (25 mg total) by mouth 2 (two) times a day.

## 2025-03-06 NOTE — ASSESSMENT & PLAN NOTE
Blood pressure is borderline in the office today and she will continue her current regiment of hydrochlorothiazide, metoprolol and olmesartan and monitor her blood pressures between visits.  She is to get her renal function testing done and we will adjust her medications if she remains elevated.

## 2025-03-06 NOTE — TELEPHONE ENCOUNTER
Medicine Refill Request    Last Office Visit: 3/6/2025   Last Consult Visit: Visit date not found  Last Telemedicine Visit: Visit date not found    Next Appointment: 7/7/2025      Current Outpatient Medications:     ALPRAZolam (XANAX) 0.25 mg tablet, Take 1 tablet (0.25 mg total) by mouth 2 (two) times a day as needed for anxiety., Disp: 60 tablet, Rfl: 2    blood-glucose meter (PRECISION XTRA MONITOR) misc, 1 strip 2 (two) times a day., Disp: 1 each, Rfl: 0    escitalopram (LEXAPRO) 20 mg tablet, Take 1 tablet (20 mg total) by mouth daily., Disp: 90 tablet, Rfl: 3    fentaNYL (DURAGESIC) 25 mcg/hr, Place 1 patch on the skin See admin instr. Apply 1 patch topically every 72 hours. Use with 12mcg, for 37 mcg total., Disp: 10 patch, Rfl: 0    fluticasone propionate (FLONASE) 50 mcg/actuation nasal spray, Administer 2 sprays into each nostril daily., Disp: 16 g, Rfl: 5    FREESTYLE THOM 2 SENSOR kit, PLACE 1 DEVICE ON THE SKIN EVERY 2 WEEKS., Disp: , Rfl:     hydrochlorothiazide (HYDRODIURIL) 25 mg tablet, TAKE 1 TABLET (25 MG TOTAL) BY MOUTH DAILY., Disp: 90 tablet, Rfl: 3    lancets (freestyle) 28 gauge misc, 1 strip 2 (two) times a day., Disp: 100 each, Rfl: 3    lansoprazole (PREVACID) 30 mg capsule, Take 1 capsule (30 mg total) by mouth daily before breakfast., Disp: 90 capsule, Rfl: 3    metoprolol succinate XL (TOPROL-XL) 100 mg 24 hr tablet, Take 1 tablet (100 mg total) by mouth daily., Disp: 90 tablet, Rfl: 3    olmesartan (BENICAR) 40 mg tablet, Take 1 tablet (40 mg total) by mouth daily., Disp: 90 tablet, Rfl: 3    oxyCODONE (OXY-IR) 5 mg capsule, TAKE 1-2 CAPSULES BY MOUTH EVERY 8 HOURS AS NEEDED PAIN, Disp: , Rfl:     pregabalin (LYRICA) 25 mg capsule, Take 1 capsule (25 mg total) by mouth 2 (two) times a day., Disp: 60 capsule, Rfl: 1    semaglutide (OZEMPIC) 0.25 mg or 0.5 mg(2 mg/1.5 mL) pen injector, Inject 0.25 mg under skin once a week for 4 weeks, then increase to 0.5 mg once a week., Disp: , Rfl:      BP Readings from Last 3 Encounters:   03/06/25 (!) 144/84   12/31/24 132/84   05/20/24 124/70       Recent Lab results:  Lab Results   Component Value Date    CHOL 220 (H) 05/18/2024   ,   Lab Results   Component Value Date    HDL 38 (L) 05/18/2024   ,   Lab Results   Component Value Date    LDLCALC 133 (H) 05/18/2024   ,   Lab Results   Component Value Date    TRIG 276 (H) 05/18/2024        Lab Results   Component Value Date    GLUCOSE 119 (H) 05/18/2024   ,   Lab Results   Component Value Date    HGBA1C 7.7 (H) 05/18/2024         Lab Results   Component Value Date    CREATININE 0.62 05/18/2024       Lab Results   Component Value Date    TSH 7.560 (H) 05/18/2024           Lab Results   Component Value Date    HGBA1C 7.7 (H) 05/18/2024

## 2025-03-06 NOTE — ASSESSMENT & PLAN NOTE
Currently managed by pain management and previously saw orthopedics at [facility] who told her she needs a knee replacement which she is not interested in.

## 2025-03-06 NOTE — ASSESSMENT & PLAN NOTE
Will continue on low-dose alprazolam 0.25 mg twice daily for now and we will work on weaning at future visits.

## 2025-03-06 NOTE — PROGRESS NOTES
"Subjective      Patient ID: Hilda Cornelius is a 59 y.o. female.  1965      This 59-year-old  female presents 2 months I last saw her for follow-up.  She says she has been sick since then with recurrent ear infections and has been prescribed 3 different sets of antibiotics but continues to have discomfort in both her ears right worse than left.  Other than that she is concerned that her son's kitten was recently diagnosed with roundworm and she wonders whether she needs to get tested.  She continues to take care of her grandson who is now in  a couple days a week and is coming home with all sorts of viral infections.  She continues to live with a lot of pain in her back and her neck as well as her left knee.  She got an injection about a month ago from her pain management provider and her left knee which helped the pain but the knee has been swollen since then.  Gabapentin 100 mg helped her right lower extremity radicular symptoms but within 3 days of taking it she developed a tremor like she did on the higher dose of gabapentin and discontinued it.  She is compliant with the rest of her medications and unfortunately has not gotten her blood work done yet.        The following have been reviewed and updated as appropriate in this visit:   Tobacco  Allergies  Meds  Problems  Med Hx  Surg Hx  Fam Hx       Review of Systems    Objective     Vitals:    03/06/25 1415   BP: (!) 144/84   Pulse: 76   Resp: 16   Temp: 36.5 °C (97.7 °F)   SpO2: 96%   Weight: 81.2 kg (179 lb)   Height: 1.676 m (5' 6\")     Body mass index is 28.89 kg/m².    Physical Exam  Vitals reviewed.   Constitutional:       General: She is not in acute distress.     Appearance: She is normal weight. She is not ill-appearing.      Comments: Her weight is stable.   HENT:      Ears:      Comments: Auditory canals are normal bilaterally and she has scarring of both TMs which are retracted.  Cardiovascular:      Rate and Rhythm: " Normal rate and regular rhythm.      Heart sounds: Normal heart sounds.   Pulmonary:      Effort: Pulmonary effort is normal.      Breath sounds: Normal breath sounds.   Musculoskeletal:      Right lower leg: No edema.      Left lower leg: No edema.      Comments: She does have a moderate effusion of the left knee on exam.   Lymphadenopathy:      Cervical: No cervical adenopathy.   Skin:     General: Skin is warm and dry.   Psychiatric:         Mood and Affect: Mood normal.         Assessment & Plan  Benzodiazepine dependence (CMS/HCC)  Will continue on low-dose alprazolam 0.25 mg twice daily for now and we will work on weaning at future visits.       Recurrent major depressive episodes, moderate (CMS/HCC)  Well-controlled on Lexapro 20 mg daily which she will continue.       Type 2 diabetes mellitus with hyperlipidemia (CMS/HCC)  (CMS/HCC)  She is encouraged to get her blood work done.       Chronic pain syndrome  Followed by pain management on oxycodone and fentanyl.  Gabapentin 100 mg helped her radicular symptoms but caused her to have a tremor so she discontinued it after 3 days so we will try low-dose Lyrica.  Orders:    pregabalin (LYRICA) 25 mg capsule; Take 1 capsule (25 mg total) by mouth 2 (two) times a day.    Essential hypertension  Blood pressure is borderline in the office today and she will continue her current regiment of hydrochlorothiazide, metoprolol and olmesartan and monitor her blood pressures between visits.  She is to get her renal function testing done and we will adjust her medications if she remains elevated.       Chronic pain of left knee  Currently managed by pain management and previously saw orthopedics at [facility] who told her she needs a knee replacement which she is not interested in.       Anxiety  Fair control on Lexapro 20 mg and alprazolam.       Obstruction of both eustachian tubes  I put her on fluticasone nasal spray and she has an appointment to see ENT next week.  She has had  amoxicillin on January 7, Augmentin on February 17 and Bactrim on February 22 for treatment of her ear infections.  Orders:    fluticasone propionate (FLONASE) 50 mcg/actuation nasal spray; Administer 2 sprays into each nostril daily.

## 2025-03-26 DIAGNOSIS — F41.9 ANXIETY: ICD-10-CM

## 2025-03-26 RX ORDER — ALPRAZOLAM 0.25 MG/1
0.25 TABLET ORAL 2 TIMES DAILY PRN
Qty: 60 TABLET | Refills: 2 | Status: SHIPPED | OUTPATIENT
Start: 2025-03-26 | End: 2025-06-24

## 2025-03-26 RX ORDER — ALBUTEROL SULFATE 90 UG/1
2 INHALANT RESPIRATORY (INHALATION) EVERY 6 HOURS PRN
Qty: 8.5 G | Refills: 1 | Status: SHIPPED | OUTPATIENT
Start: 2025-03-26

## 2025-03-26 RX ORDER — ALBUTEROL SULFATE 90 UG/1
2 INHALANT RESPIRATORY (INHALATION) EVERY 6 HOURS PRN
COMMUNITY
End: 2025-03-26 | Stop reason: SDUPTHER

## 2025-03-26 NOTE — TELEPHONE ENCOUNTER
Medicine Refill Request    Last Office Visit: 3/6/2025   Last Consult Visit: Visit date not found  Last Telemedicine Visit: Visit date not found    Next Appointment: 7/7/2025      Current Outpatient Medications:     albuterol HFA 90 mcg/actuation inhaler, Inhale 2 puffs every 6 (six) hours as needed for wheezing., Disp: , Rfl:     ALPRAZolam (XANAX) 0.25 mg tablet, Take 1 tablet (0.25 mg total) by mouth 2 (two) times a day as needed for anxiety., Disp: 60 tablet, Rfl: 2    blood-glucose meter (PRECISION XTRA MONITOR) misc, 1 strip 2 (two) times a day., Disp: 1 each, Rfl: 0    escitalopram (LEXAPRO) 20 mg tablet, Take 1 tablet (20 mg total) by mouth daily., Disp: 90 tablet, Rfl: 3    fentaNYL (DURAGESIC) 25 mcg/hr, Place 1 patch on the skin See admin instr. Apply 1 patch topically every 72 hours. Use with 12mcg, for 37 mcg total., Disp: 10 patch, Rfl: 0    fluticasone propionate (FLONASE) 50 mcg/actuation nasal spray, Administer 2 sprays into each nostril daily., Disp: 16 g, Rfl: 5    FREESTYLE THOM 2 SENSOR kit, PLACE 1 DEVICE ON THE SKIN EVERY 2 WEEKS., Disp: , Rfl:     hydrochlorothiazide (HYDRODIURIL) 25 mg tablet, TAKE 1 TABLET (25 MG TOTAL) BY MOUTH DAILY., Disp: 90 tablet, Rfl: 3    lancets (freestyle) 28 gauge misc, 1 strip 2 (two) times a day., Disp: 100 each, Rfl: 3    lansoprazole (PREVACID) 30 mg capsule, Take 1 capsule (30 mg total) by mouth daily before breakfast., Disp: 90 capsule, Rfl: 3    metoprolol succinate XL (TOPROL-XL) 100 mg 24 hr tablet, Take 1 tablet (100 mg total) by mouth daily., Disp: 90 tablet, Rfl: 3    olmesartan (BENICAR) 40 mg tablet, Take 1 tablet (40 mg total) by mouth daily., Disp: 90 tablet, Rfl: 3    oxyCODONE (OXY-IR) 5 mg capsule, TAKE 1-2 CAPSULES BY MOUTH EVERY 8 HOURS AS NEEDED PAIN, Disp: , Rfl:     pregabalin (LYRICA) 25 mg capsule, Take 1 capsule (25 mg total) by mouth 2 (two) times a day., Disp: 60 capsule, Rfl: 1    semaglutide (OZEMPIC) 0.25 mg or 0.5 mg(2 mg/1.5 mL)  subcutaneous injection, Inject 0.25 mg under the skin every (seven) 7 days., Disp: 2 mL, Rfl: 2    BP Readings from Last 3 Encounters:   03/06/25 (!) 144/84   12/31/24 132/84   05/20/24 124/70       Recent Lab results:  Lab Results   Component Value Date    CHOL 220 (H) 05/18/2024   ,   Lab Results   Component Value Date    HDL 38 (L) 05/18/2024   ,   Lab Results   Component Value Date    LDLCALC 133 (H) 05/18/2024   ,   Lab Results   Component Value Date    TRIG 276 (H) 05/18/2024        Lab Results   Component Value Date    GLUCOSE 119 (H) 05/18/2024   ,   Lab Results   Component Value Date    HGBA1C 7.7 (H) 05/18/2024         Lab Results   Component Value Date    CREATININE 0.62 05/18/2024       Lab Results   Component Value Date    TSH 7.560 (H) 05/18/2024           Lab Results   Component Value Date    HGBA1C 7.7 (H) 05/18/2024

## 2025-06-19 DIAGNOSIS — F41.9 ANXIETY: ICD-10-CM

## 2025-06-20 RX ORDER — ALPRAZOLAM 0.25 MG/1
0.25 TABLET ORAL 2 TIMES DAILY PRN
Qty: 60 TABLET | Refills: 2 | Status: SHIPPED | OUTPATIENT
Start: 2025-06-20 | End: 2025-06-20 | Stop reason: SDUPTHER

## 2025-06-20 RX ORDER — ALPRAZOLAM 0.25 MG/1
0.25 TABLET ORAL 2 TIMES DAILY PRN
Qty: 60 TABLET | Refills: 2 | Status: SHIPPED | OUTPATIENT
Start: 2025-06-20 | End: 2025-09-18

## 2025-06-20 NOTE — TELEPHONE ENCOUNTER
Medicine Refill Request    Last Office Visit: 3/6/2025   Last Consult Visit: Visit date not found  Last Telemedicine Visit: Visit date not found    Next Appointment: 7/7/2025      Current Outpatient Medications:     albuterol HFA 90 mcg/actuation inhaler, Inhale 2 puffs every 6 (six) hours as needed for wheezing., Disp: 8.5 g, Rfl: 1    ALPRAZolam (XANAX) 0.25 mg tablet, Take 1 tablet (0.25 mg total) by mouth 2 (two) times a day as needed for anxiety., Disp: 60 tablet, Rfl: 2    blood-glucose meter (PRECISION XTRA MONITOR) misc, 1 strip 2 (two) times a day., Disp: 1 each, Rfl: 0    escitalopram (LEXAPRO) 20 mg tablet, Take 1 tablet (20 mg total) by mouth daily., Disp: 90 tablet, Rfl: 3    fentaNYL (DURAGESIC) 25 mcg/hr, Place 1 patch on the skin See admin instr. Apply 1 patch topically every 72 hours. Use with 12mcg, for 37 mcg total., Disp: 10 patch, Rfl: 0    fluticasone propionate (FLONASE) 50 mcg/actuation nasal spray, Administer 2 sprays into each nostril daily., Disp: 16 g, Rfl: 5    FREESTYLE THOM 2 SENSOR kit, PLACE 1 DEVICE ON THE SKIN EVERY 2 WEEKS., Disp: , Rfl:     hydrochlorothiazide (HYDRODIURIL) 25 mg tablet, TAKE 1 TABLET (25 MG TOTAL) BY MOUTH DAILY., Disp: 90 tablet, Rfl: 3    lancets (freestyle) 28 gauge misc, 1 strip 2 (two) times a day., Disp: 100 each, Rfl: 3    lansoprazole (PREVACID) 30 mg capsule, Take 1 capsule (30 mg total) by mouth daily before breakfast., Disp: 90 capsule, Rfl: 3    metFORMIN XR (GLUCOPHAGE-XR) 500 mg 24 hr tablet, Take 1 tablet (500 mg total) by mouth daily with breakfast., Disp: 90 tablet, Rfl: 1    metoprolol succinate XL (TOPROL-XL) 100 mg 24 hr tablet, Take 1 tablet (100 mg total) by mouth daily., Disp: 90 tablet, Rfl: 3    olmesartan (BENICAR) 40 mg tablet, Take 1 tablet (40 mg total) by mouth daily., Disp: 90 tablet, Rfl: 3    oxyCODONE (OXY-IR) 5 mg capsule, TAKE 1-2 CAPSULES BY MOUTH EVERY 8 HOURS AS NEEDED PAIN, Disp: , Rfl:     pregabalin (LYRICA) 25 mg  capsule, Take 1 capsule (25 mg total) by mouth 2 (two) times a day., Disp: 60 capsule, Rfl: 1    semaglutide (OZEMPIC) 0.25 mg or 0.5 mg(2 mg/1.5 mL) subcutaneous injection, Inject 0.25 mg under the skin every (seven) 7 days., Disp: 2 mL, Rfl: 2    BP Readings from Last 3 Encounters:   03/06/25 (!) 144/84   12/31/24 132/84   05/20/24 124/70       Recent Lab results:  Lab Results   Component Value Date    CHOL 220 (H) 05/18/2024   ,   Lab Results   Component Value Date    HDL 38 (L) 05/18/2024   ,   Lab Results   Component Value Date    LDLCALC 133 (H) 05/18/2024   ,   Lab Results   Component Value Date    TRIG 276 (H) 05/18/2024        Lab Results   Component Value Date    GLUCOSE 119 (H) 05/18/2024   ,   Lab Results   Component Value Date    HGBA1C 7.7 (H) 05/18/2024         Lab Results   Component Value Date    CREATININE 0.62 05/18/2024       Lab Results   Component Value Date    TSH 7.560 (H) 05/18/2024           Lab Results   Component Value Date    HGBA1C 7.7 (H) 05/18/2024

## 2025-06-22 DIAGNOSIS — F41.9 ANXIETY: ICD-10-CM

## 2025-06-23 ENCOUNTER — TELEPHONE (OUTPATIENT)
Dept: FAMILY MEDICINE | Facility: CLINIC | Age: 60
End: 2025-06-23
Payer: COMMERCIAL

## 2025-06-23 DIAGNOSIS — F41.9 ANXIETY: ICD-10-CM

## 2025-06-23 RX ORDER — ALPRAZOLAM 0.25 MG/1
0.25 TABLET ORAL 2 TIMES DAILY PRN
Qty: 60 TABLET | Refills: 2 | OUTPATIENT
Start: 2025-06-23 | End: 2025-09-21

## 2025-06-23 RX ORDER — ALPRAZOLAM 0.25 MG/1
TABLET ORAL
Qty: 60 TABLET | Refills: 2 | OUTPATIENT
Start: 2025-06-23

## 2025-06-23 NOTE — TELEPHONE ENCOUNTER
Phone call got disconnected. Lvm for pt, waiting for  to give us the okay to Call pharm to give a verbal to pharm.

## 2025-07-07 ENCOUNTER — OFFICE VISIT (OUTPATIENT)
Dept: FAMILY MEDICINE | Facility: CLINIC | Age: 60
End: 2025-07-07
Payer: COMMERCIAL

## 2025-07-07 VITALS
TEMPERATURE: 98.1 F | HEART RATE: 77 BPM | DIASTOLIC BLOOD PRESSURE: 80 MMHG | HEIGHT: 65 IN | OXYGEN SATURATION: 99 % | SYSTOLIC BLOOD PRESSURE: 120 MMHG | RESPIRATION RATE: 16 BRPM | WEIGHT: 177 LBS | BODY MASS INDEX: 29.49 KG/M2

## 2025-07-07 DIAGNOSIS — F41.9 ANXIETY: ICD-10-CM

## 2025-07-07 DIAGNOSIS — I10 ESSENTIAL HYPERTENSION: ICD-10-CM

## 2025-07-07 DIAGNOSIS — J20.9 ACUTE BRONCHITIS, UNSPECIFIED ORGANISM: ICD-10-CM

## 2025-07-07 DIAGNOSIS — E11.69 TYPE 2 DIABETES MELLITUS WITH HYPERLIPIDEMIA (CMS/HCC)  (CMS/HCC): Primary | ICD-10-CM

## 2025-07-07 DIAGNOSIS — E78.5 TYPE 2 DIABETES MELLITUS WITH HYPERLIPIDEMIA (CMS/HCC)  (CMS/HCC): Primary | ICD-10-CM

## 2025-07-07 DIAGNOSIS — G89.4 CHRONIC PAIN SYNDROME: ICD-10-CM

## 2025-07-07 PROCEDURE — 3074F SYST BP LT 130 MM HG: CPT | Performed by: FAMILY MEDICINE

## 2025-07-07 PROCEDURE — G22XX PR COMPLEXITY INHERENT TO E&M -ADD ON NON-BILLABLE: HCPCS | Performed by: FAMILY MEDICINE

## 2025-07-07 PROCEDURE — 99214 OFFICE O/P EST MOD 30 MIN: CPT | Performed by: FAMILY MEDICINE

## 2025-07-07 PROCEDURE — 3079F DIAST BP 80-89 MM HG: CPT | Performed by: FAMILY MEDICINE

## 2025-07-07 PROCEDURE — 3008F BODY MASS INDEX DOCD: CPT | Performed by: FAMILY MEDICINE

## 2025-07-07 RX ORDER — AZITHROMYCIN 250 MG/1
TABLET, FILM COATED ORAL
Qty: 6 TABLET | Refills: 0 | Status: SHIPPED | OUTPATIENT
Start: 2025-07-07 | End: 2025-07-12

## 2025-07-07 RX ORDER — ALBUTEROL SULFATE 90 UG/1
2 INHALANT RESPIRATORY (INHALATION) EVERY 6 HOURS PRN
Qty: 8.5 G | Refills: 1 | Status: SHIPPED | OUTPATIENT
Start: 2025-07-07

## 2025-07-07 RX ORDER — SEMAGLUTIDE 1.34 MG/ML
0.5 INJECTION, SOLUTION SUBCUTANEOUS
Qty: 2 ML | Refills: 1 | Status: SHIPPED | OUTPATIENT
Start: 2025-07-07

## 2025-07-07 RX ORDER — PREDNISONE 20 MG/1
40 TABLET ORAL DAILY
Qty: 10 TABLET | Refills: 0 | Status: SHIPPED | OUTPATIENT
Start: 2025-07-07 | End: 2025-07-12

## 2025-07-07 ASSESSMENT — PATIENT HEALTH QUESTIONNAIRE - PHQ9: SUM OF ALL RESPONSES TO PHQ9 QUESTIONS 1 & 2: 0

## 2025-07-07 NOTE — PROGRESS NOTES
"Subjective      Patient ID: Hilda Cornelius is a 59 y.o. female.  1965      This 59-year-old  female presents 4 months since I last saw her complaining of URI symptoms that started 10 days ago and have progressively worsened.  She is expectorating purulent sputum, is coughing all day long and feels weak and drained.  She wonders whether she needs to use her albuterol and needs a refill.  She continues to take alprazolam twice daily with fair relief of her anxiety along with Lexapro 20 mg daily.  For reasons unclear to either 1 of us her Ozempic prescription was decreased to 0.25 mg a week resulting in her blood sugars increasing from the low 100s on the 0.5 mg weekly to the mid to high 100s for which she started taking metformin which makes her feel poorly.  She has not had blood work done in over a year because she says she does not make herself a priority and is always taking care of everyone else.  She continues not to smoke.        The following have been reviewed and updated as appropriate in this visit:   Tobacco  Allergies  Meds  Problems  Med Hx  Surg Hx  Fam Hx       Review of Systems    Objective     Vitals:    07/07/25 1737   BP: 120/80   BP Location: Left upper arm   Patient Position: Sitting   Pulse: 77   Resp: 16   Temp: 36.7 °C (98.1 °F)   TempSrc: Oral   SpO2: 99%   Weight: 80.3 kg (177 lb)   Height: 1.651 m (5' 5\")     Body mass index is 29.45 kg/m².    Physical Exam  Vitals reviewed.   Constitutional:       General: She is not in acute distress.     Appearance: She is normal weight. She is ill-appearing.      Comments: She was 2 pounds less than she did 4 months ago and is coughing throughout the office visit.   HENT:      Right Ear: Tympanic membrane, ear canal and external ear normal.      Left Ear: Tympanic membrane, ear canal and external ear normal.   Cardiovascular:      Rate and Rhythm: Normal rate and regular rhythm.      Heart sounds: Normal heart sounds.   Pulmonary: "      Effort: Pulmonary effort is normal.      Breath sounds: Wheezing (Inspiratory and expiratory wheezes bilaterally with fair air movement.) present.   Musculoskeletal:      Cervical back: Neck supple.   Lymphadenopathy:      Cervical: No cervical adenopathy.   Skin:     General: Skin is warm and dry.   Neurological:      Mental Status: She is alert.   Psychiatric:         Attention and Perception: Attention normal.         Mood and Affect: Mood is depressed. Affect is tearful.         Speech: Speech normal.         Assessment & Plan  Type 2 diabetes mellitus with hyperlipidemia (CMS/HCC)  (CMS/[facility])  I increased her Ozempic back up to the 0.5 mg weekly dose which is what she is supposed to be on and asked her to continue metformin for now until she gets her blood work done.  I explained to her that it is very important that she get her blood work done at her earliest convenience as I cannot continue prescribing medications for her sugar and blood pressure without knowing what is going on with her kidneys and her blood sugar.  Orders:    semaglutide (OZEMPIC) 0.25 mg or 0.5 mg(2 mg/1.5 mL) subcutaneous injection; Inject 0.5 mg under the skin every (seven) 7 days.    Essential hypertension  Blood pressure is controlled on hydrochlorothiazide and olmesartan and renal function testing is pending.       Acute bronchitis, unspecified organism  Acute bronchitis with bronchospasm for which I put her on azithromycin and prednisone and refilled her albuterol.  She will let me know if she is not improved in the next 48 hours.  Orders:    predniSONE (DELTASONE) 20 mg tablet; Take 2 tablets (40 mg total) by mouth daily for 5 days.    albuterol HFA 90 mcg/actuation inhaler; Inhale 2 puffs every 6 (six) hours as needed for wheezing.    azithromycin (ZITHROMAX) 250 mg tablet; Take 2 tablets the first day, then 1 tablet daily for 4 days.    Anxiety  Currently her mood is not well-controlled on Lexapro 20 mg and alprazolam 0.25 mg  which I have agreed to continue as long as she complies with follow-up on her blood work on her chronic illnesses.       Chronic pain syndrome  She did not tolerate pregabalin which she says made her feel like she was on fire.  She has not tolerated gabapentin in the past which she said caused her to have a tremor.  She will follow-up with pain management who is managing her fentanyl and oxycodone.     I attest that this visit supports the complexity inherent to evaluation and management associated with medical care services that serve as the continuing focal point for all needed health care services and/or medical care services that are part of ongoing care related to this patient's single, serious condition or a complex condition.

## 2025-07-07 NOTE — ASSESSMENT & PLAN NOTE
Blood pressure is controlled on hydrochlorothiazide and olmesartan and renal function testing is pending.

## 2025-07-07 NOTE — ASSESSMENT & PLAN NOTE
I increased her Ozempic back up to the 0.5 mg weekly dose which is what she is supposed to be on and asked her to continue metformin for now until she gets her blood work done.  I explained to her that it is very important that she get her blood work done at her earliest convenience as I cannot continue prescribing medications for her sugar and blood pressure without knowing what is going on with her kidneys and her blood sugar.  Orders:    semaglutide (OZEMPIC) 0.25 mg or 0.5 mg(2 mg/1.5 mL) subcutaneous injection; Inject 0.5 mg under the skin every (seven) 7 days.

## 2025-07-07 NOTE — ASSESSMENT & PLAN NOTE
She did not tolerate pregabalin which she says made her feel like she was on fire.  She has not tolerated gabapentin in the past which she said caused her to have a tremor.  She will follow-up with pain management who is managing her fentanyl and oxycodone.     I attest that this visit supports the complexity inherent to evaluation and management associated with medical care services that serve as the continuing focal point for all needed health care services and/or medical care services that are part of ongoing care related to this patient's single, serious condition or a complex condition.

## 2025-07-07 NOTE — ASSESSMENT & PLAN NOTE
Currently her mood is not well-controlled on Lexapro 20 mg and alprazolam 0.25 mg which I have agreed to continue as long as she complies with follow-up on her blood work on her chronic illnesses.

## 2025-07-08 DIAGNOSIS — E11.69 TYPE 2 DIABETES MELLITUS WITH HYPERLIPIDEMIA (CMS/HCC)  (CMS/HCC): ICD-10-CM

## 2025-07-08 DIAGNOSIS — E78.5 TYPE 2 DIABETES MELLITUS WITH HYPERLIPIDEMIA (CMS/HCC)  (CMS/HCC): ICD-10-CM

## 2025-07-08 RX ORDER — SEMAGLUTIDE 1.34 MG/ML
0.5 INJECTION, SOLUTION SUBCUTANEOUS
Qty: 2 ML | Refills: 1 | Status: CANCELLED | OUTPATIENT
Start: 2025-07-08

## 2025-07-08 NOTE — TELEPHONE ENCOUNTER
Medicine Refill Request    Last Office Visit: 7/7/2025   Last Consult Visit: Visit date not found  Last Telemedicine Visit: Visit date not found    Next Appointment: 9/16/2025      Current Outpatient Medications:     albuterol HFA 90 mcg/actuation inhaler, Inhale 2 puffs every 6 (six) hours as needed for wheezing., Disp: 8.5 g, Rfl: 1    ALPRAZolam (XANAX) 0.25 mg tablet, Take 1 tablet (0.25 mg total) by mouth 2 (two) times a day as needed for anxiety., Disp: 60 tablet, Rfl: 2    azithromycin (ZITHROMAX) 250 mg tablet, Take 2 tablets the first day, then 1 tablet daily for 4 days., Disp: 6 tablet, Rfl: 0    escitalopram (LEXAPRO) 20 mg tablet, Take 1 tablet (20 mg total) by mouth daily., Disp: 90 tablet, Rfl: 3    fentaNYL (DURAGESIC) 25 mcg/hr, Place 1 patch on the skin See admin instr. Apply 1 patch topically every 72 hours. Use with 12mcg, for 37 mcg total., Disp: 10 patch, Rfl: 0    fluticasone propionate (FLONASE) 50 mcg/actuation nasal spray, Administer 2 sprays into each nostril daily., Disp: 16 g, Rfl: 5    hydrochlorothiazide (HYDRODIURIL) 25 mg tablet, TAKE 1 TABLET (25 MG TOTAL) BY MOUTH DAILY., Disp: 90 tablet, Rfl: 3    lancets (freestyle) 28 gauge misc, 1 strip 2 (two) times a day., Disp: 100 each, Rfl: 3    lansoprazole (PREVACID) 30 mg capsule, Take 1 capsule (30 mg total) by mouth daily before breakfast., Disp: 90 capsule, Rfl: 3    metFORMIN XR (GLUCOPHAGE-XR) 500 mg 24 hr tablet, Take 1 tablet (500 mg total) by mouth daily with breakfast., Disp: 90 tablet, Rfl: 1    metoprolol succinate XL (TOPROL-XL) 100 mg 24 hr tablet, Take 1 tablet (100 mg total) by mouth daily., Disp: 90 tablet, Rfl: 3    olmesartan (BENICAR) 40 mg tablet, Take 1 tablet (40 mg total) by mouth daily., Disp: 90 tablet, Rfl: 3    oxyCODONE (OXY-IR) 5 mg capsule, TAKE 1-2 CAPSULES BY MOUTH EVERY 8 HOURS AS NEEDED PAIN, Disp: , Rfl:     predniSONE (DELTASONE) 20 mg tablet, Take 2 tablets (40 mg total) by mouth daily for 5 days.,  Disp: 10 tablet, Rfl: 0    semaglutide (OZEMPIC) 0.25 mg or 0.5 mg(2 mg/1.5 mL) subcutaneous injection, Inject 0.5 mg under the skin every (seven) 7 days., Disp: 2 mL, Rfl: 1    BP Readings from Last 3 Encounters:   07/07/25 120/80   03/06/25 (!) 144/84   12/31/24 132/84       Recent Lab results:  Lab Results   Component Value Date    CHOL 220 (H) 05/18/2024   ,   Lab Results   Component Value Date    HDL 38 (L) 05/18/2024   ,   Lab Results   Component Value Date    LDLCALC 133 (H) 05/18/2024   ,   Lab Results   Component Value Date    TRIG 276 (H) 05/18/2024        Lab Results   Component Value Date    GLUCOSE 119 (H) 05/18/2024   ,   Lab Results   Component Value Date    HGBA1C 7.7 (H) 05/18/2024         Lab Results   Component Value Date    CREATININE 0.62 05/18/2024       Lab Results   Component Value Date    TSH 7.560 (H) 05/18/2024           Lab Results   Component Value Date    HGBA1C 7.7 (H) 05/18/2024

## 2025-07-20 DIAGNOSIS — I10 HYPERTENSION, ESSENTIAL: ICD-10-CM

## 2025-07-21 ENCOUNTER — TELEPHONE (OUTPATIENT)
Dept: FAMILY MEDICINE | Facility: CLINIC | Age: 60
End: 2025-07-21

## 2025-07-21 RX ORDER — METOPROLOL SUCCINATE 100 MG/1
100 TABLET, EXTENDED RELEASE ORAL DAILY
Qty: 90 TABLET | Refills: 3 | Status: SHIPPED | OUTPATIENT
Start: 2025-07-21

## 2025-07-21 NOTE — TELEPHONE ENCOUNTER
Medicine Refill Request    Last Office Visit: 7/7/2025   Last Consult Visit: Visit date not found  Last Telemedicine Visit: Visit date not found    Next Appointment: 9/16/2025      Current Outpatient Medications:     albuterol HFA 90 mcg/actuation inhaler, Inhale 2 puffs every 6 (six) hours as needed for wheezing., Disp: 8.5 g, Rfl: 1    ALPRAZolam (XANAX) 0.25 mg tablet, Take 1 tablet (0.25 mg total) by mouth 2 (two) times a day as needed for anxiety., Disp: 60 tablet, Rfl: 2    escitalopram (LEXAPRO) 20 mg tablet, Take 1 tablet (20 mg total) by mouth daily., Disp: 90 tablet, Rfl: 3    fentaNYL (DURAGESIC) 25 mcg/hr, Place 1 patch on the skin See admin instr. Apply 1 patch topically every 72 hours. Use with 12mcg, for 37 mcg total., Disp: 10 patch, Rfl: 0    fluticasone propionate (FLONASE) 50 mcg/actuation nasal spray, Administer 2 sprays into each nostril daily., Disp: 16 g, Rfl: 5    hydrochlorothiazide (HYDRODIURIL) 25 mg tablet, TAKE 1 TABLET (25 MG TOTAL) BY MOUTH DAILY., Disp: 90 tablet, Rfl: 3    lancets (freestyle) 28 gauge misc, 1 strip 2 (two) times a day., Disp: 100 each, Rfl: 3    lansoprazole (PREVACID) 30 mg capsule, Take 1 capsule (30 mg total) by mouth daily before breakfast., Disp: 90 capsule, Rfl: 3    metFORMIN XR (GLUCOPHAGE-XR) 500 mg 24 hr tablet, Take 1 tablet (500 mg total) by mouth daily with breakfast., Disp: 90 tablet, Rfl: 1    metoprolol succinate XL (TOPROL-XL) 100 mg 24 hr tablet, Take 1 tablet (100 mg total) by mouth daily., Disp: 90 tablet, Rfl: 3    olmesartan (BENICAR) 40 mg tablet, Take 1 tablet (40 mg total) by mouth daily., Disp: 90 tablet, Rfl: 3    oxyCODONE (OXY-IR) 5 mg capsule, TAKE 1-2 CAPSULES BY MOUTH EVERY 8 HOURS AS NEEDED PAIN, Disp: , Rfl:     predniSONE (DELTASONE) 20 mg tablet, Take 2 tablets (40 mg total) by mouth daily for 5 days., Disp: 10 tablet, Rfl: 0    semaglutide (OZEMPIC) 0.25 mg or 0.5 mg(2 mg/1.5 mL) subcutaneous injection, Inject 0.5 mg under the  skin every (seven) 7 days., Disp: 2 mL, Rfl: 1    BP Readings from Last 3 Encounters:   07/07/25 120/80   03/06/25 (!) 144/84   12/31/24 132/84       Recent Lab results:  Lab Results   Component Value Date    CHOL 220 (H) 05/18/2024   ,   Lab Results   Component Value Date    HDL 38 (L) 05/18/2024   ,   Lab Results   Component Value Date    LDLCALC 133 (H) 05/18/2024   ,   Lab Results   Component Value Date    TRIG 276 (H) 05/18/2024        Lab Results   Component Value Date    GLUCOSE 119 (H) 05/18/2024   ,   Lab Results   Component Value Date    HGBA1C 7.7 (H) 05/18/2024         Lab Results   Component Value Date    CREATININE 0.62 05/18/2024       Lab Results   Component Value Date    TSH 7.560 (H) 05/18/2024           Lab Results   Component Value Date    HGBA1C 7.7 (H) 05/18/2024

## 2025-07-24 DIAGNOSIS — F41.9 ANXIETY: ICD-10-CM

## 2025-07-25 RX ORDER — ALPRAZOLAM 0.25 MG/1
0.25 TABLET ORAL 2 TIMES DAILY PRN
Qty: 60 TABLET | Refills: 2 | Status: SHIPPED | OUTPATIENT
Start: 2025-07-25 | End: 2025-10-23

## 2025-08-20 LAB
25(OH)D3+25(OH)D2 SERPL-MCNC: 19.1 NG/ML (ref 30–100)
ALBUMIN SERPL-MCNC: 4.5 G/DL (ref 3.9–4.9)
ALBUMIN/CREAT UR: 8 MG/G CREAT (ref 0–29)
ALP SERPL-CCNC: 48 IU/L (ref 44–121)
ALT SERPL-CCNC: 15 IU/L (ref 0–32)
AST SERPL-CCNC: 18 IU/L (ref 0–40)
BASOPHILS # BLD AUTO: 0.1 X10E3/UL (ref 0–0.2)
BASOPHILS NFR BLD AUTO: 1 %
BILIRUB SERPL-MCNC: 0.4 MG/DL (ref 0–1.2)
BUN SERPL-MCNC: 16 MG/DL (ref 8–27)
BUN/CREAT SERPL: 23 (ref 12–28)
CALCIUM SERPL-MCNC: 9.7 MG/DL (ref 8.7–10.3)
CHLORIDE SERPL-SCNC: 99 MMOL/L (ref 96–106)
CHOLEST SERPL-MCNC: 227 MG/DL (ref 100–199)
CO2 SERPL-SCNC: 19 MMOL/L (ref 20–29)
CREAT SERPL-MCNC: 0.71 MG/DL (ref 0.57–1)
CREAT UR-MCNC: 70.6 MG/DL
EGFRCR SERPLBLD CKD-EPI 2021: 97 ML/MIN/1.73
EOSINOPHIL # BLD AUTO: 0.3 X10E3/UL (ref 0–0.4)
EOSINOPHIL NFR BLD AUTO: 3 %
ERYTHROCYTE [DISTWIDTH] IN BLOOD BY AUTOMATED COUNT: 13.6 % (ref 11.7–15.4)
GLOBULIN SER CALC-MCNC: 2.5 G/DL (ref 1.5–4.5)
GLUCOSE SERPL-MCNC: 120 MG/DL (ref 70–99)
HBA1C MFR BLD: 7.6 % (ref 4.8–5.6)
HCT VFR BLD AUTO: 41.5 % (ref 34–46.6)
HCV IGG SERPL QL IA: NON REACTIVE
HDLC SERPL-MCNC: 37 MG/DL
HGB BLD-MCNC: 13.7 G/DL (ref 11.1–15.9)
IMM GRANULOCYTES # BLD AUTO: 0 X10E3/UL (ref 0–0.1)
IMM GRANULOCYTES NFR BLD AUTO: 0 %
LDLC SERPL CALC-MCNC: 131 MG/DL (ref 0–99)
LYMPHOCYTES # BLD AUTO: 3.9 X10E3/UL (ref 0.7–3.1)
LYMPHOCYTES NFR BLD AUTO: 41 %
MCH RBC QN AUTO: 29.9 PG (ref 26.6–33)
MCHC RBC AUTO-ENTMCNC: 33 G/DL (ref 31.5–35.7)
MCV RBC AUTO: 91 FL (ref 79–97)
MICROALBUMIN UR-MCNC: 5.8 UG/ML
MONOCYTES # BLD AUTO: 0.9 X10E3/UL (ref 0.1–0.9)
MONOCYTES NFR BLD AUTO: 10 %
NEUTROPHILS # BLD AUTO: 4.2 X10E3/UL (ref 1.4–7)
NEUTROPHILS NFR BLD AUTO: 45 %
PLATELET # BLD AUTO: 362 X10E3/UL (ref 150–450)
POTASSIUM SERPL-SCNC: 3.8 MMOL/L (ref 3.5–5.2)
PROT SERPL-MCNC: 7 G/DL (ref 6–8.5)
RBC # BLD AUTO: 4.58 X10E6/UL (ref 3.77–5.28)
SODIUM SERPL-SCNC: 137 MMOL/L (ref 134–144)
T4 FREE SERPL-MCNC: 0.66 NG/DL (ref 0.82–1.77)
TRIGL SERPL-MCNC: 330 MG/DL (ref 0–149)
TSH SERPL DL<=0.005 MIU/L-ACNC: 38.8 UIU/ML (ref 0.45–4.5)
VLDLC SERPL CALC-MCNC: 59 MG/DL (ref 5–40)
WBC # BLD AUTO: 9.5 X10E3/UL (ref 3.4–10.8)